# Patient Record
Sex: MALE | Race: WHITE | NOT HISPANIC OR LATINO | Employment: FULL TIME | URBAN - METROPOLITAN AREA
[De-identification: names, ages, dates, MRNs, and addresses within clinical notes are randomized per-mention and may not be internally consistent; named-entity substitution may affect disease eponyms.]

---

## 2019-03-27 ENCOUNTER — HOSPITAL ENCOUNTER (OUTPATIENT)
Dept: RESPIRATORY THERAPY | Facility: HOSPITAL | Age: 49
Discharge: HOME | End: 2019-03-27
Attending: INTERNAL MEDICINE | Admitting: INTERNAL MEDICINE

## 2019-05-28 ENCOUNTER — HOSPITAL ENCOUNTER (OUTPATIENT)
Dept: FAMILY MEDICINE CLINIC | Facility: CLINIC | Age: 49
Setting detail: SPECIMEN
Discharge: HOME | End: 2019-05-28
Attending: HOSPITALIST | Admitting: HOSPITALIST

## 2019-05-28 ENCOUNTER — CONVERSION ENCOUNTER (OUTPATIENT)
Dept: FAMILY MEDICINE CLINIC | Facility: CLINIC | Age: 49
End: 2019-05-28

## 2019-05-28 LAB
ALBUMIN SERPL-MCNC: 4.1 G/DL (ref 3.5–4.8)
ALBUMIN/GLOB SERPL: 1.4 {RATIO} (ref 1–1.7)
ALP SERPL-CCNC: 67 IU/L (ref 32–91)
ALT SERPL-CCNC: 27 IU/L (ref 17–63)
ANION GAP SERPL CALC-SCNC: 14.8 MMOL/L (ref 10–20)
AST SERPL-CCNC: 29 IU/L (ref 15–41)
BASOPHILS # BLD AUTO: 0 10*3/UL (ref 0–0.2)
BASOPHILS NFR BLD AUTO: 1 % (ref 0–2)
BILIRUB SERPL-MCNC: 1.1 MG/DL (ref 0.3–1.2)
BUN SERPL-MCNC: 18 MG/DL (ref 8–20)
BUN/CREAT SERPL: 13.8 (ref 6.2–20.3)
CALCIUM SERPL-MCNC: 9.2 MG/DL (ref 8.9–10.3)
CHLORIDE SERPL-SCNC: 102 MMOL/L (ref 101–111)
CHOLEST SERPL-MCNC: 171 MG/DL
CHOLEST/HDLC SERPL: 3.9 {RATIO}
CONV CO2: 24 MMOL/L (ref 22–32)
CONV LDL CHOLESTEROL DIRECT: 82 MG/DL (ref 0–100)
CONV TOTAL PROTEIN: 7.1 G/DL (ref 6.1–7.9)
CREAT UR-MCNC: 1.3 MG/DL (ref 0.7–1.2)
DIFFERENTIAL METHOD BLD: (no result)
EOSINOPHIL # BLD AUTO: 0.1 10*3/UL (ref 0–0.3)
EOSINOPHIL # BLD AUTO: 1 % (ref 0–3)
ERYTHROCYTE [DISTWIDTH] IN BLOOD BY AUTOMATED COUNT: 13.6 % (ref 11.5–14.5)
FOLATE SERPL-MCNC: >24.8 NG/ML (ref 5.9–24.8)
GLOBULIN UR ELPH-MCNC: 3 G/DL (ref 2.5–3.8)
GLUCOSE SERPL-MCNC: 88 MG/DL (ref 65–99)
HCT VFR BLD AUTO: 41.8 % (ref 40–54)
HDLC SERPL-MCNC: 43 MG/DL
HGB BLD-MCNC: 14.1 G/DL (ref 14–18)
LDLC/HDLC SERPL: 1.9 {RATIO}
LIPID INTERPRETATION: ABNORMAL
LYMPHOCYTES # BLD AUTO: 1.6 10*3/UL (ref 0.8–4.8)
LYMPHOCYTES NFR BLD AUTO: 23 % (ref 18–42)
MCH RBC QN AUTO: 30.4 PG (ref 26–32)
MCHC RBC AUTO-ENTMCNC: 33.8 G/DL (ref 32–36)
MCV RBC AUTO: 89.9 FL (ref 80–94)
MONOCYTES # BLD AUTO: 0.7 10*3/UL (ref 0.1–1.3)
MONOCYTES NFR BLD AUTO: 10 % (ref 2–11)
NEUTROPHILS # BLD AUTO: 4.6 10*3/UL (ref 2.3–8.6)
NEUTROPHILS NFR BLD AUTO: 65 % (ref 50–75)
NRBC BLD AUTO-RTO: 0 /100{WBCS}
NRBC/RBC NFR BLD MANUAL: 0 10*3/UL
PLATELET # BLD AUTO: 213 10*3/UL (ref 150–450)
PMV BLD AUTO: 9.6 FL (ref 7.4–10.4)
POTASSIUM SERPL-SCNC: 3.8 MMOL/L (ref 3.6–5.1)
PSA SERPL-MCNC: 0.59 NG/ML (ref 0–4)
RBC # BLD AUTO: 4.66 10*6/UL (ref 4.6–6)
SODIUM SERPL-SCNC: 137 MMOL/L (ref 136–144)
T4 FREE SERPL-MCNC: 1.07 NG/DL (ref 0.58–1.64)
TRIGL SERPL-MCNC: 548 MG/DL
TSH SERPL-ACNC: 1.93 UIU/ML (ref 0.34–5.6)
VIT B12 SERPL-MCNC: 491 PG/ML (ref 180–914)
VLDLC SERPL CALC-MCNC: 46.1 MG/DL
WBC # BLD AUTO: 7 10*3/UL (ref 4.5–11.5)

## 2019-06-04 VITALS
WEIGHT: 209 LBS | RESPIRATION RATE: 8 BRPM | HEART RATE: 80 BPM | SYSTOLIC BLOOD PRESSURE: 140 MMHG | BODY MASS INDEX: 28.31 KG/M2 | DIASTOLIC BLOOD PRESSURE: 92 MMHG | HEIGHT: 72 IN

## 2019-06-06 NOTE — PROGRESS NOTES
Vital Signs:    Patient Profile:    48 Years Old Male  Height:     72 inches (182.88 cm)  Weight:     209 pounds  BMI:        28.34     Temp:       99.1 degrees F oral  Pulse rate: 80 / minute  Pulse rhythm:   regular  Resp:       8 per minute  BP Sittin / 92  (left arm)    Cuff size:  large      Problems: Active problems were reviewed with the patient during this visit.  Medications: Medications were reviewed with the patient during this visit.  Allergies: Allergies were reviewed with the patient during this visit.        Vitals Entered By: [facility]      Visit Type:  New Pt  Referring Provider:  Chaim Rainey MD  Primary Provider:  Chaim Rainey MD      History of Present Illness:         The patient comes in today for a New Patient.  Here for new patient visit.  Has Crohns disease.           When questioned about any new concerns, the patient voices concerns with nothing in particular.           ROS is positive for bowel problems.  ROS is negative for chest pain, SOA, fatique, dizzyness, headaches, fever, chills, abdominal pain, bladder problems and urinary symptoms.        Past Medical History:     Reviewed history from 2016 and no changes required:        chron's        Sleep apnea        Elevated LFT's        LASIK    Past Surgical History:     colon ressection x 2     appendectomy     ORIF lt ankle     septoplasty    Family History Summary:      Reviewed history Last on 2019 and no changes required:2019  First Degree Blood Relative - Has No Known Family History - Entered On: 3/18/2016    General Comments - FH:  father  with COPD  Colon and lung cancer        Social History:     Reviewed history from 2019 and no changes required:                employed at [employer]                Smoking History:        Patient has never smoked.        Risk Factors:     Smoked Tobacco Use:  Never smoker  Smokeless Tobacco Use:  Never  Passive smoke exposure:  no  Drug use:   no  Caffeine use:  4 drinks per day  Alcohol use:  yes     Type:  social     Drinks per day:  0     Counseled to quit/cut down alcohol use:  yes  Exercise:  no    Previous Tobacco Use: Signed On - 01/07/2019  Smoked Tobacco Use:  Never smoker  Smokeless Tobacco Use:  Never  Passive smoke exposure:  no    Previous Alcohol Use: Signed On - 01/07/2019  Alcohol use:  no  Exercise:  no        Physical Exam   Height:  72  Weight:  202  BP:  140/92 mm HG    Medication List:  STELARA 90 MG/ML SUBCUTANEOUS SOLUTION PREFILLED SYRINGE (USTEKINUMAB) inject every 4 weeks  NEXIUM 40 MG ORAL CAPSULE DELAYED RELEASE (ESOMEPRAZOLE MAGNESIUM) take 1 pill daily  PROAIR  (90 Base) MCG/ACT INHALATION AEROSOL SOLUTION (ALBUTEROL SULFATE) 2 puffs q4h PRN  * MULTIVITAMIN       Surgical History   colon ressection x 2  appendectomy  ORIF lt ankle  septoplasty,    Risk Factors  Tobacco Use: Never smoker  Passive smoke exposure: no  Exercise: no  Illicit Drug use: no      Physical Examination   General Appearance   In no acute distress.  Alert & oriented.  Behavior and affect appropriate to situation  Skin   No suspicious lesions, moles or rashes . Turgor good.  HEENT   PERRLA, EOMI, TM's normal.  Pharynx clear  Neck   Supple.  No adenopathy  Cardiovascular   Regular rate and rhythm with S4 gale  Lungs   Clear to auscultation  Abdomen   Soft, non-tender.  Bowel sounds present.  No hepatosplenomegaly  Back   Examination of the back reveals straight leg lift is negative.  Flexes without much problem.  Lateral extension is good.  Reflexes are symmetric and there is no obvious deformity  Musculoskeletal   OA changes, degenerative changes  Neurologic   CN grossly intact  Psych   Alert and cooperative; normal mood and affect; normal attention span and concentration  Rectal   prostate enlarged, heme pending        Impression & Recommendations:    Problem # 1:  Preventive Health Care Exam (ICD-V70.0) (OQT50-V93.00)  check labs    This patient  overall looks good related to their annual checkup.  Problem areas were addressed and they were encouraged to continue good lifestyle habits and behaviors.    Orders:  [facility] CBC W/DIFF; PATH REVIEW IF INDICATED (CBC)  [facility] LIPID PANEL (LIPID)  [facility] PSA TOTAL (SCREENING) (PSA)  [facility] COMPREHENSIVE METABOLIC PANEL (CMP) (MPC)  [facility] VITAMIN B12 (B12)  [facility] FOLATE (FOL)  [facility] THYROID STIMULATING HORMONE (TSH) (TSH)  [facility] T4 THYROXINE FREE (FT4)      Problem # 2:  Crohn's disease (ICD-555.9) (WQE75-F30.90)  stable    Orders:  [facility] CBC W/DIFF; PATH REVIEW IF INDICATED (CBC)  [facility] LIPID PANEL (LIPID)  [facility] PSA TOTAL (SCREENING) (PSA)  [facility] COMPREHENSIVE METABOLIC PANEL (CMP) (MPC)  [facility] VITAMIN B12 (B12)  [facility] FOLATE (FOL)  [facility] THYROID STIMULATING HORMONE (TSH) (TSH)  [facility] T4 THYROXINE FREE (FT4)      Problem # 3:  GERD-esophageal reflux (ICD-530.81) (XWB43-R52.9)    Orders:  H CBC W/DIFF; PATH REVIEW IF INDICATED (CBC)  [facility] LIPID PANEL (LIPID)  [facility] PSA TOTAL (SCREENING) (PSA)  [facility] COMPREHENSIVE METABOLIC PANEL (CMP) (MPC)  [facility] VITAMIN B12 (B12)  [facility] FOLATE (FOL)  [facility] THYROID STIMULATING HORMONE (TSH) (TSH)  [facility] T4 THYROXINE FREE (FT4)    His updated medication list for this problem includes:     Nexium 40 Mg Oral Capsule Delayed Release (Esomeprazole magnesium) ..... Take 1 pill daily      Medications Added to Medication List This Visit:  1)  Stelara 90 Mg/ml Subcutaneous Solution Prefilled Syringe (Ustekinumab) .... Inject every 4 weeks  2)  Nexium 40 Mg Oral Capsule Delayed Release (Esomeprazole magnesium) .... Take 1 pill daily    Other Orders:  Hemocult - In house (CPT-)      Patient Instructions:  1)  During this visit for their annual exam, we reviewed their personal history, social history and family history.  We went over their medications and all the recommended health maintenence items for their age group. They were given the opportunity to ask   questions and discuss other concerns.  2)  Please schedule a follow-up  appointment as needed.                Medication Administration    Orders Added:  1)  Ofc Vst, New Level IV [[id number]]  2)  [facility] CBC W/DIFF; PATH REVIEW IF INDICATED [CBC]  3)  [facility] LIPID PANEL [LIPID]  4)  [facility] PSA TOTAL (SCREENING) [PSA]  5)  [facility] COMPREHENSIVE METABOLIC PANEL (CMP) [MPC]  6)  [facility] VITAMIN B12 [B12]  7)  [facility] FOLATE [FOL]  8)  [facility] THYROID STIMULATING HORMONE (TSH) [TSH]  9)  [facility] T4 THYROXINE FREE [FT4]  10)  Hemocult - In house [CPT-]    Technician: Medical Assistant SKYE HOLGUIN               Date/Time Collected: May 28, 2019 4:43 PM)  Date/Time Received: May 28, 2019 4:43 PM)  Performed by: kendall    Stool - Occult Blood   iFOB  #1: negative    05/28/2019      Negative (normal range)  Comments: entered by s        Electronically signed by Chaim Rainey MD on 05/28/2019 at 4:54 PM  ________________________________________________________________________       Disclaimer: Converted Note message may not contain all data elements that existed in the legacy source system. Please see [employer] for the original note details.

## 2020-01-27 ENCOUNTER — TELEPHONE (OUTPATIENT)
Dept: GASTROENTEROLOGY | Facility: CLINIC | Age: 50
End: 2020-01-27

## 2020-01-27 NOTE — TELEPHONE ENCOUNTER
Kimberly danielle from [facility] pharmacy a is needed for patient because he had his stelara transferred  [facility]  Cardholder Id [id number]  N [id number]  BIN [id number]  Group # [id number]

## 2020-02-03 ENCOUNTER — TELEPHONE (OUTPATIENT)
Dept: GASTROENTEROLOGY | Facility: CLINIC | Age: 50
End: 2020-02-03

## 2020-02-03 NOTE — TELEPHONE ENCOUNTER
Pt called and left VM, [facility]. He is requesting an update and to make her the his insurance has received his PA. Gave PA fax number of [phone]. Please call pt to discuss

## 2020-02-04 NOTE — TELEPHONE ENCOUNTER
Notified patient PA was faxed in last week.  Faxed it in again this morning.  He will let me know if he has any problems. pk

## 2020-02-05 ENCOUNTER — TELEPHONE (OUTPATIENT)
Dept: GASTROENTEROLOGY | Facility: CLINIC | Age: 50
End: 2020-02-05

## 2020-02-05 DIAGNOSIS — K50.90 CROHN'S DISEASE WITHOUT COMPLICATION, UNSPECIFIED GASTROINTESTINAL TRACT LOCATION (HCC): Primary | ICD-10-CM

## 2020-02-05 RX ORDER — METHYLPREDNISOLONE 4 MG/1
TABLET ORAL
Qty: 1 EACH | Refills: 0 | Status: CANCELLED | OUTPATIENT
Start: 2020-02-05

## 2020-02-05 NOTE — TELEPHONE ENCOUNTER
Patient would like for you to attempt the authorization, over the phone No Patient Care Coordination Note on file.   missed his [facility] Zarate and is late having the Feb injection. Patient is symptotic, and would like to know is there something he can take till he can get injection approved . Please advise     [employer] [phone]

## 2020-02-05 NOTE — TELEPHONE ENCOUNTER
Patient's Stelara was delayed due to change in insurance.  He will receive the medication this week.  He c/o fatigue, lethargy, stool frequency, diarrhea and lower abdominal cramping.  He is requesting a new rx such as Entocort.

## 2020-02-06 ENCOUNTER — SPECIALTY PHARMACY (OUTPATIENT)
Dept: PHARMACY | Facility: HOSPITAL | Age: 50
End: 2020-02-06

## 2020-02-06 DIAGNOSIS — K50.919 CROHN'S DISEASE WITH COMPLICATION, UNSPECIFIED GASTROINTESTINAL TRACT LOCATION (HCC): Primary | ICD-10-CM

## 2020-02-06 RX ORDER — METHYLPREDNISOLONE 4 MG/1
TABLET ORAL
Qty: 1 EACH | Refills: 0 | Status: SHIPPED | OUTPATIENT
Start: 2020-02-06 | End: 2020-02-19

## 2020-02-19 ENCOUNTER — OFFICE VISIT (OUTPATIENT)
Dept: GASTROENTEROLOGY | Facility: CLINIC | Age: 50
End: 2020-02-19

## 2020-02-19 ENCOUNTER — PREP FOR SURGERY (OUTPATIENT)
Dept: OTHER | Facility: HOSPITAL | Age: 50
End: 2020-02-19

## 2020-02-19 VITALS
SYSTOLIC BLOOD PRESSURE: 126 MMHG | TEMPERATURE: 98 F | HEIGHT: 72 IN | HEART RATE: 82 BPM | OXYGEN SATURATION: 99 % | WEIGHT: 220.4 LBS | BODY MASS INDEX: 29.85 KG/M2 | DIASTOLIC BLOOD PRESSURE: 82 MMHG

## 2020-02-19 DIAGNOSIS — K50.00 CROHN'S DISEASE OF SMALL INTESTINE WITHOUT COMPLICATION (HCC): Primary | ICD-10-CM

## 2020-02-19 DIAGNOSIS — Z79.899 HIGH RISK MEDICATION USE: ICD-10-CM

## 2020-02-19 DIAGNOSIS — K21.9 GASTROESOPHAGEAL REFLUX DISEASE WITHOUT ESOPHAGITIS: ICD-10-CM

## 2020-02-19 DIAGNOSIS — K75.81 NASH (NONALCOHOLIC STEATOHEPATITIS): ICD-10-CM

## 2020-02-19 PROCEDURE — 99214 OFFICE O/P EST MOD 30 MIN: CPT | Performed by: INTERNAL MEDICINE

## 2020-02-19 RX ORDER — LORATADINE 10 MG/1
10 CAPSULE, LIQUID FILLED ORAL DAILY
COMMUNITY

## 2020-02-19 RX ORDER — ESOMEPRAZOLE MAGNESIUM 40 MG/1
40 CAPSULE, DELAYED RELEASE ORAL
COMMUNITY

## 2020-02-19 RX ORDER — CYANOCOBALAMIN 1000 UG/ML
INJECTION, SOLUTION INTRAMUSCULAR; SUBCUTANEOUS
COMMUNITY
Start: 2020-01-31 | End: 2020-09-17 | Stop reason: SDUPTHER

## 2020-02-19 NOTE — PROGRESS NOTES
Crohn's Disease and Fatigue      HPI  Presents today for follow-up.  He has an established diagnosis of Crohn's ileocolitis, colon polyps, colon cancer in paternal grandfather, MACDONALD and abnormal liver enzymes.    He continues on Stelara every 4 weeks. He has been on Stelara for approx two years and 4 week dosing for atleast one year.     He contacted the office January 2020 with flare of symptoms as he had missed a dose of his Stelara injection. He had a change of symptoms with fatigue, decreased appetite.  He was prescribed a Medrol Dosepak and felt better than he did with his typical monthly stelara. Denies abdominal pain, joint pain or skin lesions    MR enterography May 2018 with stable inflammation no changes or worrisome findings.  Last EGD and colonoscopy March 13, 2019 with sever inflammation of ileum.    Previous treatments include infliximab, adalimumab and azathioprine.    He continues to follow with dermatology with small noncancerous lesions removed on a regular basis.    He has been followed by Dr. Melvin Rojas for Macdonald and abnormal liver enzymes.  He has had previous liver biopsy without fibrosis several years ago.    Review of Systems   Constitutional: Positive for fatigue.   Allergic/Immunologic: Positive for environmental allergies and immunocompromised state.        Problem List:  There is no problem list on file for this patient.      Medical History:    Past Medical History:   Diagnosis Date   • Crohn's disease (CMS/HCC)         Social History:    Social History     Socioeconomic History   • Marital status:      Spouse name: Not on file   • Number of children: Not on file   • Years of education: Not on file   • Highest education level: Not on file   Tobacco Use   • Smoking status: Never Smoker   • Smokeless tobacco: Never Used   Substance and Sexual Activity   • Alcohol use: Yes   • Drug use: Never       Family History:   Family History   Problem Relation Age of Onset   • Colon cancer  Paternal Grandfather        Surgical History:   Past Surgical History:   Procedure Laterality Date   • COLONOSCOPY  2018   • UPPER GASTROINTESTINAL ENDOSCOPY           Current Outpatient Medications:   •  cyanocobalamin 1000 MCG/ML injection, INJECT 1ML INTRAMUSCULARLY ONCE A MONTH, Disp: , Rfl:   •  esomeprazole (nexIUM) 40 MG capsule, Take 40 mg by mouth Every Morning Before Breakfast., Disp: , Rfl:   •  Loratadine (CLARITIN) 10 MG capsule, Take  by mouth., Disp: , Rfl:   •  Multiple Vitamins-Minerals (MULTIVITAMIN ADULT EXTRA C PO), MULTIVITAMIN, Disp: , Rfl:   •  Ustekinumab (STELARA) 90 MG/ML solution prefilled syringe Injection, Inject 90 mg subcutaneously every 4 weeks., Disp: 1 mL, Rfl: 1  •  vitamin E 100 UNIT capsule, Take 100 Units by mouth Daily., Disp: , Rfl:     Allergies:   Allergies   Allergen Reactions   • Sulfa Antibiotics Hives        The following portions of the patient's history were reviewed by me and updated as appropriate: review of systems, allergies, current medications, past family history, past medical history, past social history, past surgical history and problem list.    Vitals:    02/19/20 0820   BP: 126/82   Pulse: 82   Temp: 98 °F (36.7 °C)   SpO2: 99%       Physical Exam   Abdominal: Soft. Normal appearance and bowel sounds are normal. He exhibits no distension, no pulsatile midline mass and no mass. There is no tenderness. There is no rigidity, no rebound and no guarding.   Vitals reviewed.       Assessment/ Plan  Diagnoses and all orders for this visit:    Crohn's disease of small intestine without complication (CMS/HCC)    High risk medication use    MACDONALD (nonalcoholic steatohepatitis)    Gastroesophageal reflux disease without esophagitis    Other orders  -     Multiple Vitamins-Minerals (MULTIVITAMIN ADULT EXTRA C PO); MULTIVITAMIN  -     cyanocobalamin 1000 MCG/ML injection; INJECT 1ML INTRAMUSCULARLY ONCE A MONTH  -     esomeprazole (nexIUM) 40 MG capsule; Take 40 mg by  mouth Every Morning Before Breakfast.  -     Loratadine (CLARITIN) 10 MG capsule; Take  by mouth.  -     vitamin E 100 UNIT capsule; Take 100 Units by mouth Daily.        Continue Stelara every 4 weeks.    Due to high risk medication use, it is recommended to stay up to date on vaccinations including a yearly flu vaccine. Do not receive any live virus vaccines. If you are ill, have a fever, or require an antibiotic please contact the office as we may consider holding dose of medication. Recommend yearly skin checks with dermatology and staying up to date on gynecologic exams.    Obtain blood work for medication monitoring, orders placed.     Continue Nexium for acid reflux symptoms.     Will obtain Dr Castellanos records and consider monitoring for MACDONALD with repeat FibroSURE based on his previous recommendations  Discussion:  Patient with persistent inflammation of the ileum. Will update colonoscopy, assessing for remission/improvement with every 4 week dosing. Patients main goal is to avoid surgery and we may need to consider adding MTX or change in therapy to Entyvio.       Documentation by Kelly SENA acting as a scribe in the following sections (HPI, Assessment, Plan) for the undersigned provider. Jamie    Note: Refer to After Visit Summary for details of patient counseling, education and instructions provided during the encounter    Addendum: FibroScan Reviewed March 2019 with mild fatty liver F0 to F1.

## 2020-02-20 LAB
ALBUMIN SERPL-MCNC: 4.3 G/DL (ref 3.5–5.2)
ALP SERPL-CCNC: 77 U/L (ref 39–117)
ALT SERPL-CCNC: 30 U/L (ref 1–41)
AST SERPL-CCNC: 20 U/L (ref 1–40)
BASOPHILS # BLD AUTO: 0.03 10*3/MM3 (ref 0–0.2)
BASOPHILS NFR BLD AUTO: 0.4 % (ref 0–1.5)
BILIRUB DIRECT SERPL-MCNC: 0.2 MG/DL (ref 0.2–0.3)
BILIRUB SERPL-MCNC: 0.7 MG/DL (ref 0.2–1.2)
EOSINOPHIL # BLD AUTO: 0.07 10*3/MM3 (ref 0–0.4)
EOSINOPHIL NFR BLD AUTO: 1 % (ref 0.3–6.2)
ERYTHROCYTE [DISTWIDTH] IN BLOOD BY AUTOMATED COUNT: 13 % (ref 12.3–15.4)
HCT VFR BLD AUTO: 41.5 % (ref 37.5–51)
HGB BLD-MCNC: 14 G/DL (ref 13–17.7)
IMM GRANULOCYTES # BLD AUTO: 0.02 10*3/MM3 (ref 0–0.05)
IMM GRANULOCYTES NFR BLD AUTO: 0.3 % (ref 0–0.5)
LYMPHOCYTES # BLD AUTO: 1.29 10*3/MM3 (ref 0.7–3.1)
LYMPHOCYTES NFR BLD AUTO: 18.7 % (ref 19.6–45.3)
MCH RBC QN AUTO: 30.5 PG (ref 26.6–33)
MCHC RBC AUTO-ENTMCNC: 33.7 G/DL (ref 31.5–35.7)
MCV RBC AUTO: 90.4 FL (ref 79–97)
MONOCYTES # BLD AUTO: 0.6 10*3/MM3 (ref 0.1–0.9)
MONOCYTES NFR BLD AUTO: 8.7 % (ref 5–12)
NEUTROPHILS # BLD AUTO: 4.88 10*3/MM3 (ref 1.7–7)
NEUTROPHILS NFR BLD AUTO: 70.9 % (ref 42.7–76)
NRBC BLD AUTO-RTO: 0 /100 WBC (ref 0–0.2)
PLATELET # BLD AUTO: 183 10*3/MM3 (ref 140–450)
PROT SERPL-MCNC: 7.1 G/DL (ref 6–8.5)
RBC # BLD AUTO: 4.59 10*6/MM3 (ref 4.14–5.8)
WBC # BLD AUTO: 6.89 10*3/MM3 (ref 3.4–10.8)

## 2020-02-25 NOTE — PROGRESS NOTES
Specialty Pharmacy Note      Name:  Margarito Miles  :  1970  Date:  2020       Past Medical History:   Diagnosis Date   • Crohn's disease (CMS/HCC)        Past Surgical History:   Procedure Laterality Date   • COLONOSCOPY     • UPPER GASTROINTESTINAL ENDOSCOPY         Social History     Socioeconomic History   • Marital status:      Spouse name: Not on file   • Number of children: Not on file   • Years of education: Not on file   • Highest education level: Not on file   Tobacco Use   • Smoking status: Never Smoker   • Smokeless tobacco: Never Used   Substance and Sexual Activity   • Alcohol use: Yes   • Drug use: Never       Family History   Problem Relation Age of Onset   • Colon cancer Paternal Grandfather        Allergies   Allergen Reactions   • Sulfa Antibiotics Hives       Current Outpatient Medications   Medication Sig Dispense Refill   • cyanocobalamin 1000 MCG/ML injection INJECT 1ML INTRAMUSCULARLY ONCE A MONTH     • esomeprazole (nexIUM) 40 MG capsule Take 40 mg by mouth Every Morning Before Breakfast.     • Loratadine (CLARITIN) 10 MG capsule Take  by mouth.     • Multiple Vitamins-Minerals (MULTIVITAMIN ADULT EXTRA C PO) MULTIVITAMIN     • Ustekinumab (STELARA) 90 MG/ML solution prefilled syringe Injection Inject 90 mg subcutaneously every 4 weeks. 1 mL 1   • vitamin E 100 UNIT capsule Take 100 Units by mouth Daily.       No current facility-administered medications for this visit.          LABORATORY:    Lab Results   Component Value Date    TSH 1.93 2019     No results found for: PROTIME, INR, PTT  No results found for: HAV, HCVQUANT, YPTBUX51, HCVINFO, HCVGENOTYPE  No results found for: AMPHETSCREEN, BARBITSCNUR, LABBENZSCN, COCAINEUR, LABMETHSCN, LRHNZ7E, HOXBG1EGPPFR, HEPBSAB, OXYCODONESCN, 6ACETYLMORP, BUPRENORSCNU, LABOPIASCN, PCPUR, THCURSCR  Last Urine Toxicity     There is no flowsheet data to display.          ASSESSMENT/PLAN:    Patient Update  Assessment (new medications, allergies, medical history): Reviewed    Medication(s): Stelara    Currently Taking Medication(s): Yes    Effectiveness of Medication: Effective    Experiencing Side Effects: Not experiencing side effects    Prior Authorization Status: Approved    Financial Assistance Status: None needed.    Any Issues Identified: Not aware of any issues.    Appropriate to Process Prescription(s): After discussion with patient, it is appropriate to process and dispense medication. It will be shipped to patient's home via [facility].    Counseling Offered: Declined    Next Specialty Pharmacy Visit: 3/3/2020

## 2020-03-03 ENCOUNTER — SPECIALTY PHARMACY (OUTPATIENT)
Dept: PHARMACY | Facility: HOSPITAL | Age: 50
End: 2020-03-03

## 2020-03-30 ENCOUNTER — SPECIALTY PHARMACY (OUTPATIENT)
Dept: PHARMACY | Facility: HOSPITAL | Age: 50
End: 2020-03-30

## 2020-04-03 NOTE — PROGRESS NOTES
Specialty Pharmacy Note      Name:  Margarito Miles  :  1970  Date:  2020         Past Medical History:   Diagnosis Date   • Crohn's disease (CMS/HCC)        Past Surgical History:   Procedure Laterality Date   • COLONOSCOPY     • UPPER GASTROINTESTINAL ENDOSCOPY         Social History     Socioeconomic History   • Marital status:      Spouse name: Not on file   • Number of children: Not on file   • Years of education: Not on file   • Highest education level: Not on file   Tobacco Use   • Smoking status: Never Smoker   • Smokeless tobacco: Never Used   Substance and Sexual Activity   • Alcohol use: Yes   • Drug use: Never       Family History   Problem Relation Age of Onset   • Colon cancer Paternal Grandfather        Allergies   Allergen Reactions   • Sulfa Antibiotics Hives       Current Outpatient Medications   Medication Sig Dispense Refill   • cyanocobalamin 1000 MCG/ML injection INJECT 1ML INTRAMUSCULARLY ONCE A MONTH     • esomeprazole (nexIUM) 40 MG capsule Take 40 mg by mouth Every Morning Before Breakfast.     • Loratadine (CLARITIN) 10 MG capsule Take  by mouth.     • Multiple Vitamins-Minerals (MULTIVITAMIN ADULT EXTRA C PO) MULTIVITAMIN     • Ustekinumab (STELARA) 90 MG/ML solution prefilled syringe Injection Inject 90 mg subcutaneously every 4 weeks. 1 mL 1   • vitamin E 100 UNIT capsule Take 100 Units by mouth Daily.       No current facility-administered medications for this visit.          LABORATORY:    Lab Results   Component Value Date    TSH 1.93 2019       Last Urine Toxicity     There is no flowsheet data to display.          ASSESSMENT/PLAN:    Patient Update Assessment (new medications, allergies, medical history): Reviewed     Medication(s): Stelara     Currently Taking Medication(s): Yes     Effectiveness of Medication: Effective     Experiencing Side Effects: Not experiencing side effects     Prior Authorization Status: Approved     Financial  Assistance Status: None needed.     Any Issues Identified: Not aware of any issues.    Appropriate to Process Prescription(s):  Yes.  Medication refill will be dispensed from [facility] and delivered via home delivery services per patient request.    Counseling Offered: Patient previously counseled upon initiation of therapy, aware to contact pharmacy with any new questions or concerns.      Next Specialty Pharmacy Visit:  04/22/2020

## 2020-04-22 ENCOUNTER — SPECIALTY PHARMACY (OUTPATIENT)
Dept: PHARMACY | Facility: HOSPITAL | Age: 50
End: 2020-04-22

## 2020-04-29 NOTE — PROGRESS NOTES
Specialty Pharmacy Note      Name:  Margarito Miles  :  1970  Date:  2020         Past Medical History:   Diagnosis Date   • Crohn's disease (CMS/HCC)        Past Surgical History:   Procedure Laterality Date   • COLONOSCOPY     • UPPER GASTROINTESTINAL ENDOSCOPY         Social History     Socioeconomic History   • Marital status:      Spouse name: Not on file   • Number of children: Not on file   • Years of education: Not on file   • Highest education level: Not on file   Tobacco Use   • Smoking status: Never Smoker   • Smokeless tobacco: Never Used   Substance and Sexual Activity   • Alcohol use: Yes   • Drug use: Never       Family History   Problem Relation Age of Onset   • Colon cancer Paternal Grandfather        Allergies   Allergen Reactions   • Sulfa Antibiotics Hives       Current Outpatient Medications   Medication Sig Dispense Refill   • cyanocobalamin 1000 MCG/ML injection INJECT 1ML INTRAMUSCULARLY ONCE A MONTH     • esomeprazole (nexIUM) 40 MG capsule Take 40 mg by mouth Every Morning Before Breakfast.     • Loratadine (CLARITIN) 10 MG capsule Take  by mouth.     • Multiple Vitamins-Minerals (MULTIVITAMIN ADULT EXTRA C PO) MULTIVITAMIN     • Ustekinumab (STELARA) 90 MG/ML solution prefilled syringe Injection Inject 90 mg subcutaneously every 4 weeks. 1 mL 1   • vitamin E 100 UNIT capsule Take 100 Units by mouth Daily.       No current facility-administered medications for this visit.          LABORATORY:    Lab Results   Component Value Date    TSH 1.93 2019       Last Urine Toxicity     There is no flowsheet data to display.          ASSESSMENT/PLAN:    Patient Update Assessment (new medications, allergies, medical history): Reviewed     Medication(s): Stelara 90 MG/ML solution prefilled syringe Injection.     Currently Taking Medication(s): Yes, patient reports taking medication as directed.      Effectiveness of Medication: Effective.     Experiencing Side  Effects: Not experiencing side effects, patient tolerating medication well.      Prior Authorization Status: Approved.     Financial Assistance Status: None needed at this time, co-pay reaming due from patient is $5.      Any Issues Identified: No issues expressed from patient, no issues processing refill.    Appropriate to Process Prescription(s):  Yes.  Medication refill will be dispensed from [facility] and delivered via home delivery services per patient request.    Counseling Offered: Patient previously counseled upon initiation of therapy, aware to contact pharmacy with any new questions or concerns.      Next Specialty Pharmacy Visit:  05/25/2020

## 2020-05-11 ENCOUNTER — TELEPHONE (OUTPATIENT)
Dept: GASTROENTEROLOGY | Facility: CLINIC | Age: 50
End: 2020-05-11

## 2020-05-11 RX ORDER — METHYLPREDNISOLONE 4 MG/1
TABLET ORAL
Qty: 21 TABLET | Refills: 0 | Status: SHIPPED | OUTPATIENT
Start: 2020-05-11 | End: 2020-06-04

## 2020-05-11 NOTE — TELEPHONE ENCOUNTER
Patient contacted the office with complaints of fatigue, mild abdominal pain.    He reports increased stress due to COVID-19.    He continues on Stelara monthly and B-12 injection.   He is requesting a Medrol Dosepak / entocort to help bridge symptoms with his maintenance Stelara.  [phone].    Spoke with patient.  His symptoms feel like typical Crohn's flares.  He is aware that there was active inflammation and has upcoming endoscopic evaluation planned to consider change in therapy based on active inflammation.  Prescription sent electronically for Medrol Dosepak.  COVID precautions including immunosuppression and the use of steroids was reviewed in detail and patient verbalized agreement and understanding with CDC guidelines and caution.    If symptoms do not improve, to consider testing for COVID or longer course of budesonide.    Jamie

## 2020-05-27 ENCOUNTER — SPECIALTY PHARMACY (OUTPATIENT)
Dept: PHARMACY | Facility: HOSPITAL | Age: 50
End: 2020-05-27

## 2020-06-04 ENCOUNTER — OFFICE VISIT (OUTPATIENT)
Dept: GASTROENTEROLOGY | Facility: CLINIC | Age: 50
End: 2020-06-04

## 2020-06-04 VITALS
BODY MASS INDEX: 30.37 KG/M2 | TEMPERATURE: 97.1 F | DIASTOLIC BLOOD PRESSURE: 74 MMHG | OXYGEN SATURATION: 97 % | SYSTOLIC BLOOD PRESSURE: 118 MMHG | HEIGHT: 72 IN | RESPIRATION RATE: 12 BRPM | HEART RATE: 84 BPM | WEIGHT: 224.2 LBS

## 2020-06-04 DIAGNOSIS — Z79.899 HIGH RISK MEDICATION USE: ICD-10-CM

## 2020-06-04 DIAGNOSIS — K75.81 NASH (NONALCOHOLIC STEATOHEPATITIS): ICD-10-CM

## 2020-06-04 DIAGNOSIS — K50.00 CROHN'S DISEASE OF SMALL INTESTINE WITHOUT COMPLICATION (HCC): Primary | ICD-10-CM

## 2020-06-04 PROCEDURE — 99213 OFFICE O/P EST LOW 20 MIN: CPT | Performed by: NURSE PRACTITIONER

## 2020-06-04 RX ORDER — B-COMPLEX WITH VITAMIN C
1 TABLET ORAL DAILY
COMMUNITY
Start: 2020-06-01 | End: 2020-10-22

## 2020-06-04 RX ORDER — PYRIDOXINE HCL (VITAMIN B6) 100 MG
200 TABLET ORAL DAILY
COMMUNITY
Start: 2020-06-01

## 2020-06-04 NOTE — PROGRESS NOTES
Follow-up (Crohn Disease, doing well)      HPI  49-year-old male presents today for follow-up.  Patient has established diagnosis of Crohn's ileitis, colon polyps, colon cancer in paternal grandfather, Chance and abnormal liver enzymes.  Last office visit February 19, 2020.  He continues on Stelara every 4 weeks.  He has been on Stelara for approximately 2 years and on the 4-week dosing interval for approximately 1 year.    Patient contacted the office May 11, 2020 with complaints of fatigue and mild abdominal pain.  This is typical for her symptoms he will experience with a flare of Crohn disease.  He was scheduled for colonoscopy to assess for active inflammation and possible consideration of medication change however this was canceled due to the pandemic and has been rescheduled for the next 6 to 8 weeks.    He completed Medrol Dosepak and fatigue, decreased appetite and abdominal pain completely resolved.    Patient is doing well from a GI standpoint at today's visit.  Bowel habits are regular.  He typically has 4 or 5 bowel movements on a good day.  If it is a bad day or he is in a flare he will have 8-12 bowel movements a day with right lower quadrant discomfort.    He denies melena or hematochezia.    He denies weight loss, fever, chills.    He reports compliance with his Stelara injections.    Previous treatments include infliximab, adalimumab and azathioprine.    Patient continues to follow with dermatology with small noncancerous lesions removed on a regular basis.    Patient has been followed by Dr. Melvin Rojas for diagnosis of Chance and abnormal liver enzymes.  He has had previous liver biopsy several years ago without fibrosis.    MR enterography May 2018 with stable inflammation and no changes or worrisome findings.  Last EGD and colonoscopy March 13, 2019 with severe inflammation of the ileum, biopsies benign.    Review of Systems   Constitutional: Negative.    HENT: Negative.    Eyes: Negative.     Respiratory: Negative.    Cardiovascular: Negative.    Gastrointestinal: Negative.    Endocrine: Negative.    Genitourinary: Negative.    Musculoskeletal: Negative.    Skin: Negative.    Allergic/Immunologic: Positive for environmental allergies.   Neurological: Negative.    Hematological: Negative.    Psychiatric/Behavioral: Negative.         I    Problem List:  There is no problem list on file for this patient.      Medical History:    Past Medical History:   Diagnosis Date   • Crohn's disease (CMS/HCC)         Social History:    Social History     Socioeconomic History   • Marital status:      Spouse name: Not on file   • Number of children: Not on file   • Years of education: Not on file   • Highest education level: Not on file   Tobacco Use   • Smoking status: Never Smoker   • Smokeless tobacco: Never Used   Substance and Sexual Activity   • Alcohol use: Yes   • Drug use: Never       Family History:   Family History   Problem Relation Age of Onset   • Colon cancer Paternal Grandfather    • Colon polyps Other        Surgical History:   Past Surgical History:   Procedure Laterality Date   • COLONOSCOPY  2018   • UPPER GASTROINTESTINAL ENDOSCOPY           Current Outpatient Medications:   •  cyanocobalamin 1000 MCG/ML injection, INJECT 1ML INTRAMUSCULARLY ONCE A MONTH, Disp: , Rfl:   •  esomeprazole (nexIUM) 40 MG capsule, Take 40 mg by mouth Every Morning Before Breakfast., Disp: , Rfl:   •  Loratadine (CLARITIN) 10 MG capsule, Take  by mouth., Disp: , Rfl:   •  Milk Thistle 200 MG capsule, , Disp: , Rfl:   •  Multiple Vitamins-Minerals (MULTIVITAMIN ADULT EXTRA C PO), MULTIVITAMIN, Disp: , Rfl:   •  Ustekinumab (STELARA) 90 MG/ML solution prefilled syringe Injection, Inject 90 mg subcutaneously every 4 weeks., Disp: 1 mL, Rfl: 1  •  vitamin E 100 UNIT capsule, Take 100 Units by mouth Daily., Disp: , Rfl:   •  Zinc 100 MG tablet, , Disp: , Rfl:     Allergies:   Allergies   Allergen Reactions   • Sulfa  Antibiotics Hives        The following portions of the patient's history were reviewed and updated as appropriate: allergies, current medications, past family history, past medical history, past social history, past surgical history and problem list.    Vitals:    06/04/20 0802   BP: 118/74   Pulse: 84   Resp: 12   Temp: 97.1 °F (36.2 °C)   SpO2: 97%         06/04/20 0802   Weight: 102 kg (224 lb 3.2 oz)     Body mass index is 30.41 kg/m².      Physical Exam   Constitutional: He is oriented to person, place, and time. He appears well-developed and well-nourished.   Pulmonary/Chest: Effort normal.   Neurological: He is alert and oriented to person, place, and time.   Skin: Skin is warm and dry.        Assessment/ Plan  There are no diagnoses linked to this encounter.     No follow-ups on file.    Patient Instructions   Crohn's disease, stable, continue ustekinumab.    Acid reflux, stable, continue current medication regimen.    Proceed with endoscopic evaluation assessing for active disease, as scheduled.    Based on endoscopic findings and previous discussion with Dr. Thompson, to consider change in therapy.  Upcoming colonoscopy will assess for remission/improvement in persistent inflammation of the ileum since you have been on 4-week dosing interval of Stelara.          Discussion:    Patient had improvement in fatigue, right lower quadrant discomfort and more frequent stooling with recent Medrol Dosepak.  He is doing well from a GI standpoint.  Will proceed with colonoscopy as scheduled to evaluate for chronic/persistent inflammation of the ileum as patient has been on ustekinumab at 4-week dosing interval since the time of his last colonoscopy.  Patient's main goal is to avoid surgery and we may need to consider adding an additional medication or change in therapy to vedolizumab.    COVID-19 precautions for immunosuppressed patients reviewed in detail.  Currently we do not recommend stopping medications for  inflammatory bowel disease due to the risk of IBD flare which may result in hospitalization or the need for steroids.     Patients on biologic therapy for inflammatory bowel disease are at a moderate risk of severe illness from COVID-19 and it is strongly recommended that you strictly follow the CDC recommendation on wearing masks, staying home and social distancing.

## 2020-06-04 NOTE — PATIENT INSTRUCTIONS
Crohn's disease, stable, continue ustekinumab.    Acid reflux, stable, continue current medication regimen.    Proceed with endoscopic evaluation assessing for active disease, as scheduled.    Based on endoscopic findings and previous discussion with Dr. Thompson, to consider change in therapy.  Upcoming colonoscopy will assess for remission/improvement in persistent inflammation of the ileum since you have been on 4-week dosing interval of Stelara.

## 2020-06-09 NOTE — PROGRESS NOTES
Specialty Pharmacy Note      Name:  Margarito Miles  :  1970  Date:  2020         Past Medical History:   Diagnosis Date   • Crohn's disease (CMS/HCC)        Past Surgical History:   Procedure Laterality Date   • COLONOSCOPY  2018   • UPPER GASTROINTESTINAL ENDOSCOPY         Social History     Socioeconomic History   • Marital status:      Spouse name: Not on file   • Number of children: Not on file   • Years of education: Not on file   • Highest education level: Not on file   Tobacco Use   • Smoking status: Never Smoker   • Smokeless tobacco: Never Used   Substance and Sexual Activity   • Alcohol use: Yes   • Drug use: Never       Family History   Problem Relation Age of Onset   • Colon cancer Paternal Grandfather    • Colon polyps Other        Allergies   Allergen Reactions   • Sulfa Antibiotics Hives       Current Outpatient Medications   Medication Sig Dispense Refill   • cyanocobalamin 1000 MCG/ML injection INJECT 1ML INTRAMUSCULARLY ONCE A MONTH     • esomeprazole (nexIUM) 40 MG capsule Take 40 mg by mouth Every Morning Before Breakfast.     • Loratadine (CLARITIN) 10 MG capsule Take  by mouth.     • Milk Thistle 200 MG capsule      • Multiple Vitamins-Minerals (MULTIVITAMIN ADULT EXTRA C PO) MULTIVITAMIN     • Ustekinumab (STELARA) 90 MG/ML solution prefilled syringe Injection Inject 90 mg subcutaneously every 4 weeks. 1 mL 1   • vitamin E 100 UNIT capsule Take 100 Units by mouth Daily.     • Zinc 100 MG tablet        No current facility-administered medications for this visit.          LABORATORY:    Lab Results   Component Value Date    WBC 6.89 2020    HGB 14.0 2020    HCT 41.5 2020    MCV 90.4 2020    RDW 13.0 2020     2020    NEUTRORELPCT 70.9 2020    LYMPHORELPCT 18.7 (L) 2020    MONORELPCT 8.7 2020    EOSRELPCT 1.0 2020    BASORELPCT 0.4 2020    NEUTROABS 4.88 2020    LYMPHSABS 1.29 2020        Lab Results   Component Value Date     05/28/2019    K 3.8 05/28/2019    CO2 24 05/28/2019     05/28/2019    BUN 18 05/28/2019    CREATININE 1.3 (H) 05/28/2019    GLUCOSE 88 05/28/2019    CALCIUM 9.2 05/28/2019    ALKPHOS 77 02/19/2020    AST 20 02/19/2020    ALT 30 02/19/2020    BILITOT 0.7 02/19/2020    ALBUMIN 4.30 02/19/2020    PROTEINTOT 7.1 05/28/2019       No results found for: LDH, URICACID     Imaging Results (Last 24 Hours)     ** No results found for the last 24 hours. **          ASSESSMENT/PLAN:    Patient Update Assessment (new medications, allergies, medical history): Reviewed     Medication(s): Stelara 90 MG/ML solution prefilled syringe Injection.     Currently Taking Medication(s): Yes, patient reports taking medication as directed.      Effectiveness of Medication: Effective.     Experiencing Side Effects: Not experiencing side effects, patient tolerating medication well.         Prior Authorization Status: Approved.     Financial Assistance Status: None needed at this time, co-pay reaming due from patient is $5.      Any Issues Identified: No issues expressed from patient, no issues processing refill.     Appropriate to Process Prescription(s):  Yes.  Medication refill will be dispensed from [facility] and delivered via home delivery services per patient request.     Counseling Offered: Patient previously counseled upon initiation of therapy, aware to contact pharmacy with any new questions or concerns.      Next Specialty Pharmacy Visit:  06/25/2020

## 2020-06-25 ENCOUNTER — SPECIALTY PHARMACY (OUTPATIENT)
Dept: PHARMACY | Facility: HOSPITAL | Age: 50
End: 2020-06-25

## 2020-07-01 NOTE — TELEPHONE ENCOUNTER
07/01/20 1507   Psycho Education   Type of Intervention structured groups   Response participates, initiates socially appropriate   Hours 1   Treatment Detail Processing group   CTC (writer) lead processing group about sleep hygiene. Pt demonstrated good understanding of the topic matter and provided appropriate feedback to the group.    [facility]  PK  Patient is on Stelara Q four weeks with [employer].    Card flanagan ID  [id number]  n  [id number]  BIN  [id number]  Group  [id number]  Tried & failed Remicade, Humira and Prednisone.  On Stelara for two years.  [facility]  [phone]  Sent in [place].  pk

## 2020-07-08 ENCOUNTER — LAB REQUISITION (OUTPATIENT)
Dept: LAB | Facility: HOSPITAL | Age: 50
End: 2020-07-08

## 2020-07-08 DIAGNOSIS — Z00.00 ENCOUNTER FOR GENERAL ADULT MEDICAL EXAMINATION WITHOUT ABNORMAL FINDINGS: ICD-10-CM

## 2020-07-08 PROCEDURE — U0004 COV-19 TEST NON-CDC HGH THRU: HCPCS | Performed by: INTERNAL MEDICINE

## 2020-07-09 LAB
REF LAB TEST METHOD: NORMAL
SARS-COV-2 RNA RESP QL NAA+PROBE: NOT DETECTED

## 2020-07-10 ENCOUNTER — OUTSIDE FACILITY SERVICE (OUTPATIENT)
Dept: GASTROENTEROLOGY | Facility: CLINIC | Age: 50
End: 2020-07-10

## 2020-07-10 ENCOUNTER — LAB REQUISITION (OUTPATIENT)
Dept: LAB | Facility: HOSPITAL | Age: 50
End: 2020-07-10

## 2020-07-10 DIAGNOSIS — K50.00 CROHN'S DISEASE OF SMALL INTESTINE WITHOUT COMPLICATIONS (HCC): ICD-10-CM

## 2020-07-10 PROCEDURE — 45380 COLONOSCOPY AND BIOPSY: CPT | Performed by: INTERNAL MEDICINE

## 2020-07-10 PROCEDURE — 88305 TISSUE EXAM BY PATHOLOGIST: CPT | Performed by: INTERNAL MEDICINE

## 2020-07-13 LAB
CYTO UR: NORMAL
LAB AP CASE REPORT: NORMAL
LAB AP CLINICAL INFORMATION: NORMAL
PATH REPORT.FINAL DX SPEC: NORMAL
PATH REPORT.GROSS SPEC: NORMAL

## 2020-07-14 DIAGNOSIS — K50.919 CROHN'S DISEASE WITH COMPLICATION, UNSPECIFIED GASTROINTESTINAL TRACT LOCATION (HCC): Primary | ICD-10-CM

## 2020-07-17 NOTE — PROGRESS NOTES
Specialty Pharmacy Note      Name:  Margarito Miles  :  1970  Date:  2020         Past Medical History:   Diagnosis Date   • Crohn's disease (CMS/HCC)        Past Surgical History:   Procedure Laterality Date   • COLONOSCOPY  2018   • UPPER GASTROINTESTINAL ENDOSCOPY         Social History     Socioeconomic History   • Marital status:      Spouse name: Not on file   • Number of children: Not on file   • Years of education: Not on file   • Highest education level: Not on file   Tobacco Use   • Smoking status: Never Smoker   • Smokeless tobacco: Never Used   Substance and Sexual Activity   • Alcohol use: Yes   • Drug use: Never       Family History   Problem Relation Age of Onset   • Colon cancer Paternal Grandfather    • Colon polyps Other        Allergies   Allergen Reactions   • Sulfa Antibiotics Hives       Current Outpatient Medications   Medication Sig Dispense Refill   • cyanocobalamin 1000 MCG/ML injection INJECT 1ML INTRAMUSCULARLY ONCE A MONTH     • esomeprazole (nexIUM) 40 MG capsule Take 40 mg by mouth Every Morning Before Breakfast.     • Loratadine (CLARITIN) 10 MG capsule Take  by mouth.     • Milk Thistle 200 MG capsule      • Multiple Vitamins-Minerals (MULTIVITAMIN ADULT EXTRA C PO) MULTIVITAMIN     • Ustekinumab (STELARA) 90 MG/ML solution prefilled syringe Injection Inject 90 mg subcutaneously every 4 weeks. 1 mL 1   • vitamin E 100 UNIT capsule Take 100 Units by mouth Daily.     • Zinc 100 MG tablet        No current facility-administered medications for this visit.          LABORATORY:    Lab Results   Component Value Date    WBC 6.89 2020    HGB 14.0 2020    HCT 41.5 2020    MCV 90.4 2020    RDW 13.0 2020     2020    NEUTRORELPCT 70.9 2020    LYMPHORELPCT 18.7 (L) 2020    MONORELPCT 8.7 2020    EOSRELPCT 1.0 2020    BASORELPCT 0.4 2020    NEUTROABS 4.88 2020    LYMPHSABS 1.29 2020        Lab Results   Component Value Date     05/28/2019    K 3.8 05/28/2019    CO2 24 05/28/2019     05/28/2019    BUN 18 05/28/2019    CREATININE 1.3 (H) 05/28/2019    GLUCOSE 88 05/28/2019    CALCIUM 9.2 05/28/2019    ALKPHOS 77 02/19/2020    AST 20 02/19/2020    ALT 30 02/19/2020    BILITOT 0.7 02/19/2020    ALBUMIN 4.30 02/19/2020    PROTEINTOT 7.1 05/28/2019       No results found for: LDH, URICACID     Imaging Results (Last 24 Hours)     ** No results found for the last 24 hours. **          ASSESSMENT/PLAN:    Patient Update Assessment (new medications, allergies, medical history): Reviewed     Medication(s): Stelara 90 MG/ML solution prefilled syringe Injection.     Currently Taking Medication(s): Yes, patient reports taking medication as directed.      Effectiveness of Medication: Effective.     Experiencing Side Effects: Not experiencing side effects, patient tolerating medication well.         Prior Authorization Status: Approved.     Financial Assistance Status: None needed at this time, co-pay reaming due from patient is $5.      Any Issues Identified: No issues expressed from patient, no issues processing refill.     Appropriate to Process Prescription(s):  Yes.  Medication refill will be dispensed from [facility] and delivered via home delivery services per patient request.     Counseling Offered: Patient previously counseled upon initiation of therapy, aware to contact pharmacy with any new questions or concerns.      Next Specialty Pharmacy Visit: 7/20/2020

## 2020-07-20 ENCOUNTER — SPECIALTY PHARMACY (OUTPATIENT)
Dept: PHARMACY | Facility: HOSPITAL | Age: 50
End: 2020-07-20

## 2020-07-23 ENCOUNTER — TELEPHONE (OUTPATIENT)
Dept: GASTROENTEROLOGY | Facility: CLINIC | Age: 50
End: 2020-07-23

## 2020-08-03 DIAGNOSIS — K50.919 CROHN'S DISEASE WITH COMPLICATION, UNSPECIFIED GASTROINTESTINAL TRACT LOCATION (HCC): ICD-10-CM

## 2020-08-19 ENCOUNTER — SPECIALTY PHARMACY (OUTPATIENT)
Dept: PHARMACY | Facility: HOSPITAL | Age: 50
End: 2020-08-19

## 2020-08-20 ENCOUNTER — E-VISIT (OUTPATIENT)
Dept: FAMILY MEDICINE CLINIC | Facility: TELEHEALTH | Age: 50
End: 2020-08-20

## 2020-08-20 DIAGNOSIS — Z71.89 EDUCATED ABOUT COVID-19 VIRUS INFECTION: Primary | ICD-10-CM

## 2020-08-20 PROCEDURE — 99421 OL DIG E/M SVC 5-10 MIN: CPT | Performed by: NURSE PRACTITIONER

## 2020-08-20 NOTE — PROGRESS NOTES
Evisit submission reviewed. I spent 5 minutes in the patient's chart giving advise on coronavirus testing.

## 2020-08-27 ENCOUNTER — OFFICE VISIT (OUTPATIENT)
Dept: GASTROENTEROLOGY | Facility: CLINIC | Age: 50
End: 2020-08-27

## 2020-08-27 VITALS
TEMPERATURE: 97.3 F | BODY MASS INDEX: 30.85 KG/M2 | SYSTOLIC BLOOD PRESSURE: 126 MMHG | WEIGHT: 227.8 LBS | DIASTOLIC BLOOD PRESSURE: 82 MMHG | HEART RATE: 88 BPM | HEIGHT: 72 IN | OXYGEN SATURATION: 97 %

## 2020-08-27 DIAGNOSIS — Z90.49 HISTORY OF RESECTION OF SMALL BOWEL: ICD-10-CM

## 2020-08-27 DIAGNOSIS — K50.00 CROHN'S DISEASE OF SMALL INTESTINE WITHOUT COMPLICATION (HCC): Primary | ICD-10-CM

## 2020-08-27 DIAGNOSIS — K75.81 NASH (NONALCOHOLIC STEATOHEPATITIS): ICD-10-CM

## 2020-08-27 DIAGNOSIS — Z79.899 HIGH RISK MEDICATION USE: ICD-10-CM

## 2020-08-27 PROCEDURE — 99214 OFFICE O/P EST MOD 30 MIN: CPT | Performed by: NURSE PRACTITIONER

## 2020-08-27 NOTE — PATIENT INSTRUCTIONS
Reviewed recent colonoscopy and MR enterography, summarized above.    Longstanding diagnosis of Crohn's disease status post resection.  Orders placed for surgical referral.    Continues to [place]ab subcu injections every 4 weeks.    COVID-19 recommendations based on CDC guidelines (please see CDC.gov for full list of recommendations) include     1.  Practice social distancing, social isolation, wearing mask and quarantine when appropriate (quarantine is defined as  individuals who have had potential exposure and who are asymptomatic to see if they develop symptoms for 14 days from the time of exposure).    *Social distancing is intentional increasing of space between people to prevent the spread of disease 6 to 10 feet.    *Social isolating is  sick people from healthy individuals to prevent spread.    2.  Wash your hands frequently with soap and water for at least 60 seconds especially after you have been in a public place, blowing your nose, coughing, sneezing and prior to and after eating or drinking  3.  If soap and water not readily available, use hand  that contains at least 60% alcohol.  Cover all surfaces of your hands and rub them together until they feel dry  4.  Avoid touching your nose, eyes, mouth with unwashed hands    Currently we do not recommend stopping medications for inflammatory bowel disease due to the risk of IBD flare which may result in hospitalization or the need for steroids.     Seek medical attention if you develop fever, cough, loss of sense of taste, smell or difficulty breathing.  Patients on biologic therapy for inflammatory bowel disease are at a moderate risk of severe illness from COVID-19 and it is strongly recommended that you strictly follow the CDC recommendations as discussed and if you are diagnosed with COVID-19, please contact our office for additional recommendations/guidance regarding your medications for inflammatory bowel disease.    Please  contact the office with questions or concerns or if we may be of any additional assistance otherwise contact the office with an update after surgical consult.    Continue milk thistle for fatty liver, maintain weight, avoiding weight gain and we will continue to monitor liver function test.

## 2020-08-27 NOTE — PROGRESS NOTES
Chief Complaint   Patient presents with   • Follow-up     post MRE and CLS       HPI  50-year-old male presents today for follow-up.  He has an established diagnosis of Crohn's ileocolitis, colon polyps, colon cancer in paternal grandfather, Macdonald and abnormal liver enzymes.  He has had previous appendectomy and 2 previous small bowel resections for Crohn's disease and the last was approximately 10 years ago.    He presents today for follow-up after EGD and colonoscopy July 10, 2020, results summarized below.  He then had MR of the abdomen August 3, 2020, also summarized below.      Patient continues on Stelara every 4 weeks.  He has been on Stelara for approximately 2 years and 4-week dosing for at least 1 year.    His main complaint during today's visit is fatigue.  This has been on going.  He is wondering if that is related to ongoing inflammation in his GI tract.  He also reports increased stress at work.  He continues on B12 injections but does not find any improvement with fatigue.  He would like to discuss surgical consult for possible resection if needed.    Bowel habits are regular without melena or hematochezia.  He denies nausea or vomiting.  Denies pain or trouble with swallowing.  His weight is stable.    Acid reflux is well controlled with daily Nexium.  He denies pain or trouble with swallowing.    He has been followed by Dr. Melvin Rojas for MACDONALD and abnormal liver enzymes.  He has had previous liver biopsy several years ago without fibrosis.  He denies yellowing of the skin, mental status changes, increasing abdominal girth or somnolence. He continues on milk thistle for MACDONALD.    Previous treatments include infliximab, adalimumab and azathioprine.    Patient continues to follow with dermatology with small noncancerous lesions removed on a regular basis.    He denies nonhealing skin lesions, depression, tobacco use or changes in his vision or eye infections.    REVIEW OF PREVIOUS RECORDS:  August 3, 2020  MR enterography.  Focal abnormal enhancement circumferentially and distal ileal loop with an area of developing narrowing of the lumen.  Area does not produce obstruction.  No significant inflammatory changes seen in the surrounding fat.    July 10, 2020 colonoscopy.  Diffuse area of moderately granular inflamed ulcerated and vascular pattern decreased mucosa of the terminal ileum.  Evidence of prior end-to-side colocolonic anastomosis in the ascending colon, patent.  Remainder of exam normal.  Neoterminal ileum with moderate villous blunting with moderate chronic interstitial inflammation with eosinophils no active inflammation nor granulomatous inflammation identified, no intestinal parasites.  Random colon biopsies from the ascending colon with focal changes suggesting developing hyperplastic polyp.  Transverse and descending colon biopsies with normal crypt architecture.    MR enterography May 2018 with stable inflammation with no changes or worrisome findings.  EGD March 13, 2019 with severe inflammation of the ileum.    Review of Systems   Constitutional: Negative for appetite change, chills, diaphoresis, fatigue, fever and unexpected weight change.   HENT: Negative for dental problem, ear pain, mouth sores, rhinorrhea, sore throat and voice change.    Eyes: Negative for pain, redness and visual disturbance.   Respiratory: Negative for cough, chest tightness and wheezing.    Cardiovascular: Negative for chest pain, palpitations and leg swelling.   Endocrine: Negative for cold intolerance, heat intolerance, polydipsia, polyphagia and polyuria.   Genitourinary: Negative for dysuria, frequency, hematuria and urgency.   Musculoskeletal: Negative for arthralgias, back pain, joint swelling, myalgias and neck pain.   Skin: Negative for rash.   Allergic/Immunologic: Negative for environmental allergies, food allergies and immunocompromised state.   Neurological: Negative for dizziness, seizures, weakness, numbness and  headaches.   Hematological: Does not bruise/bleed easily.   Psychiatric/Behavioral: Negative for sleep disturbance. The patient is not nervous/anxious.         Problem List:    Patient Active Problem List   Diagnosis   • Crohn's disease of small intestine without complication (CMS/HCC)   • High risk medication use   • MACDONALD (nonalcoholic steatohepatitis)       Medical History:    Past Medical History:   Diagnosis Date   • Crohn's disease (CMS/HCC)         Social History:    Social History     Socioeconomic History   • Marital status:      Spouse name: Not on file   • Number of children: Not on file   • Years of education: Not on file   • Highest education level: Not on file   Tobacco Use   • Smoking status: Never Smoker   • Smokeless tobacco: Never Used   Substance and Sexual Activity   • Alcohol use: Yes   • Drug use: Never   • Sexual activity: Defer       Family History:   Family History   Problem Relation Age of Onset   • Colon cancer Paternal Grandfather    • Colon polyps Other        Surgical History:   Past Surgical History:   Procedure Laterality Date   • COLONOSCOPY  2018   • UPPER GASTROINTESTINAL ENDOSCOPY           Current Outpatient Medications:   •  cyanocobalamin 1000 MCG/ML injection, INJECT 1ML INTRAMUSCULARLY ONCE A MONTH, Disp: , Rfl:   •  esomeprazole (nexIUM) 40 MG capsule, Take 40 mg by mouth Every Morning Before Breakfast., Disp: , Rfl:   •  Loratadine (CLARITIN) 10 MG capsule, Take  by mouth., Disp: , Rfl:   •  Milk Thistle 200 MG capsule, , Disp: , Rfl:   •  Multiple Vitamins-Minerals (MULTIVITAMIN ADULT EXTRA C PO), MULTIVITAMIN, Disp: , Rfl:   •  Ustekinumab (STELARA) 90 MG/ML solution prefilled syringe Injection, Inject 90 mg under the skin into the appropriate area as directed Every 28 (Twenty-Eight) Days., Disp: 1 mL, Rfl: 11  •  vitamin E 100 UNIT capsule, Take 100 Units by mouth Daily., Disp: , Rfl:   •  Zinc 100 MG tablet, , Disp: , Rfl:     Allergies:  Sulfa antibiotics    The  following portions of the patient's history were reviewed and updated as appropriate: allergies, current medications, past family history, past medical history, past social history, past surgical history and problem list.    Vitals:    08/27/20 0740   BP: 126/82   Pulse: 88   Temp: 97.3 °F (36.3 °C)   SpO2: 97%         08/27/20  0740   Weight: 103 kg (227 lb 12.8 oz)     Body mass index is 30.9 kg/m².    Physical Exam   Constitutional: He is oriented to person, place, and time. He appears well-developed and well-nourished. No distress.   Pulmonary/Chest: Effort normal. No respiratory distress.   Neurological: He is alert and oriented to person, place, and time.   Skin: Skin is warm and dry. He is not diaphoretic. No erythema. No pallor.   Vitals reviewed.        Assessment/ Plan  Margarito was seen today for follow-up.    Diagnoses and all orders for this visit:    Crohn's disease of small intestine without complication (CMS/HCC)  -     Ambulatory Referral to General Surgery    High risk medication use    MACDONALD (nonalcoholic steatohepatitis)    History of resection of small bowel  -     Ambulatory Referral to General Surgery         No follow-ups on file.    Patient Instructions   Reviewed recent colonoscopy and MR enterography, summarized above.    Longstanding diagnosis of Crohn's disease status post resection.  Orders placed for surgical referral.    Continues to [place] subcu injections every 4 weeks.    COVID-19 recommendations based on CDC guidelines (please see CDC.gov for full list of recommendations) include     1.  Practice social distancing, social isolation, wearing mask and quarantine when appropriate (quarantine is defined as  individuals who have had potential exposure and who are asymptomatic to see if they develop symptoms for 14 days from the time of exposure).    *Social distancing is intentional increasing of space between people to prevent the spread of disease 6 to 10 feet.    *Social  isolating is  sick people from healthy individuals to prevent spread.    2.  Wash your hands frequently with soap and water for at least 60 seconds especially after you have been in a public place, blowing your nose, coughing, sneezing and prior to and after eating or drinking  3.  If soap and water not readily available, use hand  that contains at least 60% alcohol.  Cover all surfaces of your hands and rub them together until they feel dry  4.  Avoid touching your nose, eyes, mouth with unwashed hands    Currently we do not recommend stopping medications for inflammatory bowel disease due to the risk of IBD flare which may result in hospitalization or the need for steroids.     Seek medical attention if you develop fever, cough, loss of sense of taste, smell or difficulty breathing.  Patients on biologic therapy for inflammatory bowel disease are at a moderate risk of severe illness from COVID-19 and it is strongly recommended that you strictly follow the CDC recommendations as discussed and if you are diagnosed with COVID-19, please contact our office for additional recommendations/guidance regarding your medications for inflammatory bowel disease.    Please contact the office with questions or concerns or if we may be of any additional assistance otherwise contact the office with an update after surgical consult.    Continue milk thistle for fatty liver, maintain weight, avoiding weight gain and we will continue to monitor liver function test.           Discussion:  Patient with complaints of fatigue that has been persistent despite dietary and lifestyle modifications.  He continues on B12 injections given history of ileal resection x2.  Reviewed colonoscopy and MR enterography findings.  Given chronic inflammatory changes and narrowing, recommend surgical referral to establish care to discuss surgical options.  Prior to today's visit I reviewed MR enterography findings and colonoscopy  findings with Dr. Thompson.  He felt there had been a small improvement in symptoms but overall chronic colitis is present and patient will ultimately need resection of the active area at some point.  Given patient's fatigue, he would like to discuss surgical options with surgeon and consider resection if fatigue does not improve which seems very reasonable as chronic inflammation may be contributing to fatigue as discussed.    We will continue Stelara every 4 weeks, recommendations regarding COVID-19 pandemic and immunosuppressive medications such as Stelara reviewed in detail with patient during today's visit.  Recommend continuing medication, strict adherence to CDC guidelines that include masking, social distancing, hand hygiene and if patient is diagnosed with COVID-19, he is agreeable to contact the office and we will make recommendations regarding Stelara at that time.    Patient verbalized agreement and understanding, support and reassurance provided.

## 2020-09-01 NOTE — PROGRESS NOTES
Specialty Pharmacy Note      Name:  Margarito Miles  :  1970  Date:  2020         Past Medical History:   Diagnosis Date   • Crohn's disease (CMS/HCC)        Past Surgical History:   Procedure Laterality Date   • COLONOSCOPY  2018   • UPPER GASTROINTESTINAL ENDOSCOPY         Social History     Socioeconomic History   • Marital status:      Spouse name: Not on file   • Number of children: Not on file   • Years of education: Not on file   • Highest education level: Not on file   Tobacco Use   • Smoking status: Never Smoker   • Smokeless tobacco: Never Used   Substance and Sexual Activity   • Alcohol use: Yes   • Drug use: Never   • Sexual activity: Defer       Family History   Problem Relation Age of Onset   • Colon cancer Paternal Grandfather    • Colon polyps Other        Allergies   Allergen Reactions   • Sulfa Antibiotics Hives       Current Outpatient Medications   Medication Sig Dispense Refill   • cyanocobalamin 1000 MCG/ML injection INJECT 1ML INTRAMUSCULARLY ONCE A MONTH     • esomeprazole (nexIUM) 40 MG capsule Take 40 mg by mouth Every Morning Before Breakfast.     • Loratadine (CLARITIN) 10 MG capsule Take  by mouth.     • Milk Thistle 200 MG capsule      • Multiple Vitamins-Minerals (MULTIVITAMIN ADULT EXTRA C PO) MULTIVITAMIN     • Ustekinumab (STELARA) 90 MG/ML solution prefilled syringe Injection Inject 90 mg under the skin into the appropriate area as directed Every 28 (Twenty-Eight) Days. 1 mL 11   • vitamin E 100 UNIT capsule Take 100 Units by mouth Daily.     • Zinc 100 MG tablet        No current facility-administered medications for this visit.          LABORATORY:    Lab Results   Component Value Date    WBC 6.89 2020    HGB 14.0 2020    HCT 41.5 2020    MCV 90.4 2020    RDW 13.0 2020     2020    NEUTRORELPCT 70.9 2020    LYMPHORELPCT 18.7 (L) 2020    MONORELPCT 8.7 2020    EOSRELPCT 1.0 2020     BASORELPCT 0.4 02/19/2020    NEUTROABS 4.88 02/19/2020    LYMPHSABS 1.29 02/19/2020       Lab Results   Component Value Date     05/28/2019    K 3.8 05/28/2019    CO2 24 05/28/2019     05/28/2019    BUN 18 05/28/2019    CREATININE 1.3 (H) 05/28/2019    GLUCOSE 88 05/28/2019    CALCIUM 9.2 05/28/2019    ALKPHOS 77 02/19/2020    AST 20 02/19/2020    ALT 30 02/19/2020    BILITOT 0.7 02/19/2020    ALBUMIN 4.30 02/19/2020    PROTEINTOT 7.1 05/28/2019       No results found for: LDH, URICACID     Imaging Results (Last 24 Hours)     ** No results found for the last 24 hours. **          ASSESSMENT/PLAN:    Patient Update Assessment (new medications, allergies, medical history): Reviewed     Medication(s): Stelara 90 MG/ML solution prefilled syringe Injection.     Currently Taking Medication(s): Yes, patient reports taking medication as directed.      Effectiveness of Medication: Effective.     Experiencing Side Effects: Not experiencing side effects, patient tolerating medication well.         Prior Authorization Status: Approved.     Financial Assistance Status: None needed at this time, co-pay reaming due from patient is $5.      Any Issues Identified: No issues expressed from patient, no issues processing refill.     Appropriate to Process Prescription(s):  Yes.  Medication refill will be dispensed from [facility] and delivered via home delivery services per patient request.     Counseling Offered: Patient previously counseled upon initiation of therapy, aware to contact pharmacy with any new questions or concerns.     Next Specialty Pharmacy Visit:  08/19/2020               no

## 2020-09-10 ENCOUNTER — OFFICE VISIT (OUTPATIENT)
Dept: SURGERY | Facility: CLINIC | Age: 50
End: 2020-09-10

## 2020-09-10 VITALS — BODY MASS INDEX: 30.75 KG/M2 | HEIGHT: 72 IN | WEIGHT: 227 LBS

## 2020-09-10 DIAGNOSIS — K50.00 CROHN'S DISEASE OF SMALL INTESTINE WITHOUT COMPLICATION (HCC): Primary | ICD-10-CM

## 2020-09-10 PROCEDURE — 99244 OFF/OP CNSLTJ NEW/EST MOD 40: CPT | Performed by: SURGERY

## 2020-09-10 NOTE — PROGRESS NOTES
SUMMARY (A/P):    50-year-old gentleman with Crohn's disease first diagnosed in 1999.  He has had 2 open ileocolic resections related to this.  Currently his principal symptoms are fatigue and lethargy with minimal gastrointestinal symptomatology.  I do not feel based on review of his colonoscopy and MR enterography that he is at a point that he needs to consider surgical resection.  I discussed symptomatology to look for in terms of impending need for surgical resection.  He takes vitamin B12 injections somewhat sporadically and this could be part of the issue with his fatigue and lethargy.  I have prescribed Nascobal once weekly to see if this will provide a more consistent B12 supplementation and help with his energy levels.  He will call if he develops any progressive symptoms.      CC:    Crohn's disease, referred for consultation by Dr. Thompson    HPI:    50-year-old gentleman diagnosed with Crohn's disease in 1999.  He has had 2 open ileocolic resections, most recently in 2012.  Currently his symptoms consist of rare mild crampy abdominal pain associated with 4-6 loose bowel movements per day.  Also reports chronic fatigue and lethargy.  Symptoms have insidiously progressed over the last 2 to 3 months.  No nausea or vomiting.  No weight loss.  No change in bowel habits.    RADIOLOGY/ENDOSCOPY:    • MR enterography [facility] 8/3/2020: Focal abnormal enhancement circumferentially and distal ileal loop with an area of developing narrowing of the lumen.  No evidence of obstruction.  No significant inflammatory change.  • Colonoscopy 7/10/2020: Diffuse area of mucosa and terminal ileum was moderately granular, inflamed, ulcerated and vascular pattern decreased.  Biopsies taken.    LABS:    • Pathology from terminal ileal biopsy 7/10/2020: Moderate villous blunting with moderate chronic intestinal inflammation with eosinophilia.  No active inflammation.  No granulomatous change.  Random colon biopsies normal  crypt architecture.    PHYSICAL EXAM:   • Constitutional: Well-developed well-nourished, no acute distress  • Vital signs: Weight 227 pounds, height 72 inches, BMI 30.8  • Eyes: Conjunctiva normal, sclera nonicteric  • ENMT: Hearing grossly normal, oral mucosa moist  • Neck: Supple, no palpable mass, trachea midline  • Respiratory: Clear to auscultation, normal inspiratory effort  • Cardiovascular: Regular rate, no murmur, no carotid bruit, no peripheral edema, no jugular venous distention  • Gastrointestinal: Soft, minimal right abdominal tenderness, no palpable mass, no hepatosplenomegaly, negative for hernia, well-healed midline incision, well-healed right lower quadrant transverse incision  • Lymphatics (palpable nodes):  cervical-negative, axillary-negative, inguinal-negative  • Skin:  Warm, dry, no rash on visualized skin surfaces  • Musculoskeletal: Symmetric strength, normal gait  • Psychiatric: Alert and oriented ×3, normal affect     ALLERGIES:   • Sulfa-hives    MEDICATIONS:   • Vitamin B12 injection  • Nexium  • Claritin  • Multivitamin  • Stelara    PMH:    • Crohn's disease  • Asthma (rare occurrences of issues)  • Gastroesophageal reflux disease  • Possible nonalcoholic steatohepatitis  • Sleep apnea, uses BiPAP    PSH:    • Open ileocolic resection 2012, Dr. Bairon Garcia  • Open ileocolic resection 1999, Dr. Bairon Garcia  • Open appendectomy 1989, Dr. Nikky Cloud    FAMILY HISTORY:    • Negative for inflammatory bowel disease  • Paternal grandfather with colon cancer    SOCIAL HISTORY:   • Denies tobacco use  • Occasional alcohol use    ROS:    Influenza Like Illness: no fever, no  cough, no  sore throat, no  body aches, no loss of sense of taste or smell, no known exposure to person with Covid-19.  All other systems reviewed and negative other than presenting complaints.    XAVIER DONALD M.D.

## 2020-09-11 ENCOUNTER — MEDICATION THERAPY MANAGEMENT (OUTPATIENT)
Dept: GASTROENTEROLOGY | Facility: CLINIC | Age: 50
End: 2020-09-11

## 2020-09-14 ENCOUNTER — PATIENT MESSAGE (OUTPATIENT)
Dept: SURGERY | Facility: CLINIC | Age: 50
End: 2020-09-14

## 2020-09-14 NOTE — TELEPHONE ENCOUNTER
Please advise if you will change to alternate since the Nascobal will cost the patient $700 out of pocket.

## 2020-09-15 ENCOUNTER — SPECIALTY PHARMACY (OUTPATIENT)
Dept: PHARMACY | Facility: HOSPITAL | Age: 50
End: 2020-09-15

## 2020-09-17 ENCOUNTER — TELEPHONE (OUTPATIENT)
Dept: SURGERY | Facility: CLINIC | Age: 50
End: 2020-09-17

## 2020-09-17 DIAGNOSIS — K50.00 CROHN'S DISEASE OF SMALL INTESTINE WITHOUT COMPLICATION (HCC): Primary | ICD-10-CM

## 2020-09-17 RX ORDER — CYANOCOBALAMIN 1000 UG/ML
1000 INJECTION, SOLUTION INTRAMUSCULAR; SUBCUTANEOUS WEEKLY
Qty: 1000 ML | Refills: 7 | Status: SHIPPED | OUTPATIENT
Start: 2020-09-17 | End: 2021-10-14 | Stop reason: SDUPTHER

## 2020-09-17 NOTE — TELEPHONE ENCOUNTER
There is no option in Epic for injectable B12 as outpatient.  Please call in:     Vitamin B12 1000 mcg subcutaneous injection once per week for 8 weeks    After seven weeks, he needs these labs:  1. Vitamin B12 level  2. Methylmalonic acid level  3. CBC  4. Iron profile

## 2020-10-13 ENCOUNTER — SPECIALTY PHARMACY (OUTPATIENT)
Dept: PHARMACY | Facility: HOSPITAL | Age: 50
End: 2020-10-13

## 2020-10-16 NOTE — PROGRESS NOTES
Specialty Pharmacy Note      Name:  Margarito Miles  :  1970  Date:  2020         Past Medical History:   Diagnosis Date   • Asthma     as a child   • Crohn's disease (CMS/HCC)    • GERD (gastroesophageal reflux disease)    • Nonalcoholic fatty liver disease without nonalcoholic steatohepatitis (MACDONALD)    • Sleep apnea     Bi-PAP       Past Surgical History:   Procedure Laterality Date   • APPENDECTOMY N/A     Dr. Nikky Cloud   • COLON SURGERY N/A , 2012 Dr. Dominic Campo,  Dr. Bairon Garcia   • COLONOSCOPY     • COLONOSCOPY N/A 07/10/2020    Dr. Jesús Thompson, [facility]   • ENDOSCOPY N/A     Dr. Jesús Thompson   • ORIF ANKLE FRACTURE Left     Dr. Cesar Patton       Social History     Socioeconomic History   • Marital status:      Spouse name: Not on file   • Number of children: Not on file   • Years of education: Not on file   • Highest education level: Not on file   Tobacco Use   • Smoking status: Former Smoker     Types: Cigarettes     Quit date:      Years since quittin.8   • Smokeless tobacco: Never Used   • Tobacco comment: smoked 1 ppd for 8 years   Substance and Sexual Activity   • Alcohol use: Yes   • Drug use: Never   • Sexual activity: Defer       Family History   Problem Relation Age of Onset   • Colon cancer Paternal Grandfather    • Abnormal EKG Paternal Grandfather    • Lung cancer Mother    • Colon polyps Other        Allergies   Allergen Reactions   • Sulfa Antibiotics Hives and Other (See Comments)     Fever, chills, flush       Current Outpatient Medications   Medication Sig Dispense Refill   • cyanocobalamin 1000 MCG/ML injection Inject 1 mL under the skin into the appropriate area as directed 1 (One) Time Per Week. 1000 mL 7   • esomeprazole (nexIUM) 40 MG capsule Take 40 mg by mouth Every Morning Before Breakfast.     • KRILL OIL PO Take 1 tablet by mouth Daily.     • Loratadine (CLARITIN) 10 MG capsule Take 10 mg by  mouth Daily.     • Milk Thistle 200 MG capsule Take 200 mg by mouth Daily.     • Multiple Vitamins-Minerals (MULTIVITAMIN ADULT EXTRA C PO) Take 1 tablet by mouth Daily.     • Ustekinumab (STELARA) 90 MG/ML solution prefilled syringe Injection Inject 90 mg under the skin into the appropriate area as directed Every 28 (Twenty-Eight) Days. 1 mL 11   • vitamin E 100 UNIT capsule Take 100 Units by mouth Daily.     • Zinc 100 MG tablet Take 1 tablet by mouth Daily.       No current facility-administered medications for this visit.          LABORATORY:    Lab Results   Component Value Date    WBC 6.89 02/19/2020    HGB 14.0 02/19/2020    HCT 41.5 02/19/2020    MCV 90.4 02/19/2020    RDW 13.0 02/19/2020     02/19/2020    NEUTRORELPCT 70.9 02/19/2020    LYMPHORELPCT 18.7 (L) 02/19/2020    MONORELPCT 8.7 02/19/2020    EOSRELPCT 1.0 02/19/2020    BASORELPCT 0.4 02/19/2020    NEUTROABS 4.88 02/19/2020    LYMPHSABS 1.29 02/19/2020       Lab Results   Component Value Date     05/28/2019    K 3.8 05/28/2019    CO2 24 05/28/2019     05/28/2019    BUN 18 05/28/2019    CREATININE 1.3 (H) 05/28/2019    GLUCOSE 88 05/28/2019    CALCIUM 9.2 05/28/2019    ALKPHOS 77 02/19/2020    AST 20 02/19/2020    ALT 30 02/19/2020    BILITOT 0.7 02/19/2020    ALBUMIN 4.30 02/19/2020    PROTEINTOT 7.1 05/28/2019       No results found for: LDH, URICACID     Imaging Results (Last 24 Hours)     ** No results found for the last 24 hours. **          ASSESSMENT/PLAN:    Patient Update Assessment (new medications, allergies, medical history): Reviewed     Medication(s): Stelara 90 MG/ML solution prefilled syringe Injection.     Currently Taking Medication(s): Yes, patient reports taking medication as directed.      Effectiveness of Medication: Effective.     Experiencing Side Effects: Not experiencing side effects, patient tolerating medication well.         Prior Authorization Status: Approved.     Financial Assistance Status: None  needed at this time, co-pay reaming due from patient is $5.      Any Issues Identified: No issues expressed from patient, no issues processing refill.     Appropriate to Process Prescription(s):  Yes.  Medication refill will be dispensed from [facility] and delivered via home delivery services per patient request.     Counseling Offered: Patient previously counseled upon initiation of therapy, aware to contact pharmacy with any new questions or concerns.     Next Specialty Pharmacy Visit:  Scheduled in four weeks.

## 2020-10-19 ENCOUNTER — OFFICE VISIT (OUTPATIENT)
Dept: SURGERY | Facility: CLINIC | Age: 50
End: 2020-10-19

## 2020-10-19 VITALS — WEIGHT: 227 LBS | HEIGHT: 72 IN | BODY MASS INDEX: 30.75 KG/M2

## 2020-10-19 DIAGNOSIS — K50.012 CROHN'S DISEASE OF SMALL INTESTINE WITH INTESTINAL OBSTRUCTION (HCC): Primary | ICD-10-CM

## 2020-10-19 PROCEDURE — 99214 OFFICE O/P EST MOD 30 MIN: CPT | Performed by: SURGERY

## 2020-10-19 RX ORDER — CEFAZOLIN SODIUM 2 G/100ML
2 INJECTION, SOLUTION INTRAVENOUS ONCE
Status: CANCELLED | OUTPATIENT
Start: 2020-10-27 | End: 2020-10-19

## 2020-10-20 ENCOUNTER — TRANSCRIBE ORDERS (OUTPATIENT)
Dept: PREADMISSION TESTING | Facility: HOSPITAL | Age: 50
End: 2020-10-20

## 2020-10-20 DIAGNOSIS — Z01.818 OTHER SPECIFIED PRE-OPERATIVE EXAMINATION: Primary | ICD-10-CM

## 2020-10-20 NOTE — PROGRESS NOTES
SUMMARY (A/P):    50-year-old gentleman with worsening Crohn's related symptoms.  Based on his worsening symptoms and prior work-up, he does wish to proceed with open ileocolic resection.  He understands the nature of the procedure and the risks including but not limited to bleeding, infection, and anastomotic leak requiring reoperation and temporary ostomy.  He understands risks associated with surgery are increased given that he has had 2 prior ileocolic resections.      CC:    Abdominal pain    HPI:    50-year-old gentleman whom I saw initially on 9/10/2020 for Crohn's disease.  His symptoms at that time were relatively mild and he did not feel were bothersome enough at that point to proceed with surgical resection.  Since then he has had worsening persistent nausea and moderate right periumbilical abdominal pain that is worse with food intake.  Food also worsens persistent diarrhea.  He has significant fatigue and B12 injections that he has been doing since his visit here have not helped.    RADIOLOGY/ENDOSCOPY:    · MR enterography [facility] imaging 8/3/2020: Focal abnormal enhancement circumferentially and distal ileal loop with an area of developing narrowing of the lumen.  No evidence of obstruction.  No significant inflammatory change.  · Colonoscopy 7/10/2020: Diffuse area of mucosa and terminal ileum was moderately granular, inflamed, ulcerated and vascular pattern decreased.  Biopsies taken.    LABS:    · Pathology from terminal ileal biopsy 7/10/2020: Moderate villous blunting with moderate chronic intestinal inflammation with eosinophilia.  No active inflammation.  No granulomatous change.  Random colon biopsies normal crypt architecture    PHYSICAL EXAM:   • Constitutional: Well-developed well-nourished, no acute distress  • Vital signs: Weight 227 pounds, height 72 inches, BMI 30.8  • Eyes: Conjunctiva normal, sclera nonicteric  • ENMT: Hearing grossly normal, oral mucosa moist  • Neck: Supple, no  palpable mass, trachea midline  • Respiratory: Normal inspiratory effort  • Cardiovascular: Regular rate, no murmur, no carotid bruit, no peripheral edema, no jugular venous distention  • Gastrointestinal: Soft, nontender, no palpable mass, no hepatosplenomegaly  • Skin:  Warm, dry, no rash on visualized skin surfaces  • Musculoskeletal: Symmetric strength, normal gait  • Psychiatric: Alert and oriented ×3, normal affect     ALLERGIES:   • Sulfa-hives    MEDICATIONS:   • Vitamin B12  • Nexium  • Claritin  • Multivitamin  • Stelara    PMH:    • Crohn's disease  • Asthma  • Gastroesophageal reflux disease  • Nonalcoholic steatohepatitis  • Sleep apnea, uses BiPAP    PSH:    • Open ileocolic resection 2012, Dr. Bairon Garcia  • Open ileocolic resection 1999, Dr. Bairon Garcia  • Open appendectomy 1989, Dr. Nikky Cloud    FAMILY HISTORY:    • Negative for inflammatory bowel disease  • Paternal grandfather with colon cancer    SOCIAL HISTORY:   • Denies tobacco use  • Occasional alcohol use    ROS:    Influenza Like Illness: no fever, no  cough, no  sore throat, no  body aches, no loss of sense of taste or smell, no known exposure to person with Covid-19.  All other systems reviewed and negative other than presenting complaints.    XAVIER DONALD M.D.

## 2020-10-22 ENCOUNTER — APPOINTMENT (OUTPATIENT)
Dept: PREADMISSION TESTING | Facility: HOSPITAL | Age: 50
End: 2020-10-22

## 2020-10-22 VITALS
WEIGHT: 227 LBS | BODY MASS INDEX: 30.75 KG/M2 | HEART RATE: 78 BPM | SYSTOLIC BLOOD PRESSURE: 145 MMHG | OXYGEN SATURATION: 99 % | RESPIRATION RATE: 20 BRPM | TEMPERATURE: 98.8 F | HEIGHT: 72 IN | DIASTOLIC BLOOD PRESSURE: 90 MMHG

## 2020-10-22 DIAGNOSIS — K50.00 CROHN'S DISEASE OF SMALL INTESTINE WITHOUT COMPLICATION (HCC): ICD-10-CM

## 2020-10-22 LAB
ANION GAP SERPL CALCULATED.3IONS-SCNC: 8.8 MMOL/L (ref 5–15)
BASOPHILS # BLD AUTO: 0.02 10*3/MM3 (ref 0–0.2)
BASOPHILS NFR BLD AUTO: 0.4 % (ref 0–1.5)
BUN SERPL-MCNC: 12 MG/DL (ref 6–20)
BUN/CREAT SERPL: 11 (ref 7–25)
CALCIUM SPEC-SCNC: 9.2 MG/DL (ref 8.6–10.5)
CHLORIDE SERPL-SCNC: 104 MMOL/L (ref 98–107)
CO2 SERPL-SCNC: 27.2 MMOL/L (ref 22–29)
CREAT SERPL-MCNC: 1.09 MG/DL (ref 0.76–1.27)
DEPRECATED RDW RBC AUTO: 41.9 FL (ref 37–54)
DEPRECATED RDW RBC AUTO: 42.2 FL (ref 37–54)
EOSINOPHIL # BLD AUTO: 0.1 10*3/MM3 (ref 0–0.4)
EOSINOPHIL NFR BLD AUTO: 2 % (ref 0.3–6.2)
ERYTHROCYTE [DISTWIDTH] IN BLOOD BY AUTOMATED COUNT: 13.6 % (ref 12.3–15.4)
ERYTHROCYTE [DISTWIDTH] IN BLOOD BY AUTOMATED COUNT: 13.8 % (ref 12.3–15.4)
GFR SERPL CREATININE-BSD FRML MDRD: 72 ML/MIN/1.73
GLUCOSE SERPL-MCNC: 92 MG/DL (ref 65–99)
HCT VFR BLD AUTO: 39.5 % (ref 37.5–51)
HCT VFR BLD AUTO: 39.6 % (ref 37.5–51)
HGB BLD-MCNC: 13.7 G/DL (ref 13–17.7)
HGB BLD-MCNC: 13.7 G/DL (ref 13–17.7)
IMM GRANULOCYTES # BLD AUTO: 0.02 10*3/MM3 (ref 0–0.05)
IMM GRANULOCYTES NFR BLD AUTO: 0.4 % (ref 0–0.5)
IRON 24H UR-MRATE: 132 MCG/DL (ref 59–158)
IRON SATN MFR SERPL: 27 % (ref 20–50)
LYMPHOCYTES # BLD AUTO: 1.27 10*3/MM3 (ref 0.7–3.1)
LYMPHOCYTES NFR BLD AUTO: 25.3 % (ref 19.6–45.3)
MCH RBC QN AUTO: 29.7 PG (ref 26.6–33)
MCH RBC QN AUTO: 29.8 PG (ref 26.6–33)
MCHC RBC AUTO-ENTMCNC: 34.6 G/DL (ref 31.5–35.7)
MCHC RBC AUTO-ENTMCNC: 34.7 G/DL (ref 31.5–35.7)
MCV RBC AUTO: 85.9 FL (ref 79–97)
MCV RBC AUTO: 86.1 FL (ref 79–97)
MONOCYTES # BLD AUTO: 0.44 10*3/MM3 (ref 0.1–0.9)
MONOCYTES NFR BLD AUTO: 8.8 % (ref 5–12)
NEUTROPHILS NFR BLD AUTO: 3.16 10*3/MM3 (ref 1.7–7)
NEUTROPHILS NFR BLD AUTO: 63.1 % (ref 42.7–76)
NRBC BLD AUTO-RTO: 0 /100 WBC (ref 0–0.2)
PLATELET # BLD AUTO: 199 10*3/MM3 (ref 140–450)
PLATELET # BLD AUTO: 202 10*3/MM3 (ref 140–450)
PMV BLD AUTO: 10.2 FL (ref 6–12)
PMV BLD AUTO: 10.5 FL (ref 6–12)
POTASSIUM SERPL-SCNC: 4.2 MMOL/L (ref 3.5–5.2)
RBC # BLD AUTO: 4.59 10*6/MM3 (ref 4.14–5.8)
RBC # BLD AUTO: 4.61 10*6/MM3 (ref 4.14–5.8)
SODIUM SERPL-SCNC: 140 MMOL/L (ref 136–145)
TIBC SERPL-MCNC: 496 MCG/DL (ref 298–536)
TRANSFERRIN SERPL-MCNC: 333 MG/DL (ref 200–360)
VIT B12 BLD-MCNC: 799 PG/ML (ref 211–946)
WBC # BLD AUTO: 5.01 10*3/MM3 (ref 3.4–10.8)
WBC # BLD AUTO: 5.1 10*3/MM3 (ref 3.4–10.8)

## 2020-10-22 PROCEDURE — 85025 COMPLETE CBC W/AUTO DIFF WBC: CPT | Performed by: SURGERY

## 2020-10-22 PROCEDURE — 83540 ASSAY OF IRON: CPT | Performed by: SURGERY

## 2020-10-22 PROCEDURE — 80048 BASIC METABOLIC PNL TOTAL CA: CPT | Performed by: SURGERY

## 2020-10-22 PROCEDURE — 93010 ELECTROCARDIOGRAM REPORT: CPT | Performed by: INTERNAL MEDICINE

## 2020-10-22 PROCEDURE — 93005 ELECTROCARDIOGRAM TRACING: CPT

## 2020-10-22 PROCEDURE — 84466 ASSAY OF TRANSFERRIN: CPT | Performed by: SURGERY

## 2020-10-22 PROCEDURE — 36415 COLL VENOUS BLD VENIPUNCTURE: CPT

## 2020-10-22 PROCEDURE — 82607 VITAMIN B-12: CPT | Performed by: SURGERY

## 2020-10-22 PROCEDURE — 83921 ORGANIC ACID SINGLE QUANT: CPT | Performed by: SURGERY

## 2020-10-22 RX ORDER — CHLORHEXIDINE GLUCONATE 500 MG/1
1 CLOTH TOPICAL
COMMUNITY
End: 2020-10-31 | Stop reason: HOSPADM

## 2020-10-22 RX ORDER — VITAMIN E 268 MG
400 CAPSULE ORAL DAILY
COMMUNITY

## 2020-10-22 NOTE — DISCHARGE INSTRUCTIONS
Take the following medications the morning of surgery:    claritin  nexium    If you are on prescription narcotic pain medication to control your pain you may also take that medication the morning of surgery.    General Instructions:  • Do not eat solid food after midnight the night before surgery.  • You may drink clear liquids day of surgery but must stop at least one hour before your hospital arrival time.  • It is beneficial for you to have a clear drink that contains carbohydrates the day of surgery.  We suggest a 12 to 20 ounce bottle of Gatorade or Powerade for non-diabetic patients or a 12 to 20 ounce bottle of G2 or Powerade Zero for diabetic patients. (Pediatric patients, are not advised to drink a 12 to 20 ounce carbohydrate drink)    Clear liquids are liquids you can see through.  Nothing red in color.     Plain water                               Sports drinks  Sodas                                   Gelatin (Jell-O)  Fruit juices without pulp such as white grape juice and apple juice  Popsicles that contain no fruit or yogurt  Tea or coffee (no cream or milk added)  Gatorade / Powerade  G2 / Powerade Zero    • Infants may have breast milk up to four hours before surgery.  • Infants drinking formula may drink formula up to six hours before surgery.   • Patients who avoid smoking, chewing tobacco and alcohol for 4 weeks prior to surgery have a reduced risk of post-operative complications.  Quit smoking as many days before surgery as you can.  • Do not smoke, use chewing tobacco or drink alcohol the day of surgery.   • If applicable bring your C-PAP/ BI-PAP machine.  • Bring any papers given to you in the doctor’s office.  • Wear clean comfortable clothes.  • Do not wear contact lenses, false eyelashes or make-up.  Bring a case for your glasses.   • Bring crutches or walker if applicable.  • Remove all piercings.  Leave jewelry and any other valuables at home.  • Hair extensions with metal clips must be  removed prior to surgery.  • The Pre-Admission Testing nurse will instruct you to bring medications if unable to obtain an accurate list in Pre-Admission Testing.        If you were given a blood bank ID arm band remember to bring it with you the day of surgery.    Preventing a Surgical Site Infection:  • For 2 to 3 days before surgery, avoid shaving with a razor because the razor can irritate skin and make it easier to develop an infection.    • Any areas of open skin can increase the risk of a post-operative wound infection by allowing bacteria to enter and travel throughout the body.  Notify your surgeon if you have any skin wounds / rashes even if it is not near the expected surgical site.  The area will need assessed to determine if surgery should be delayed until it is healed.  • The night prior to surgery shower using a fresh bar of anti-bacterial soap (such as Dial) and clean washcloth.  Sleep in a clean bed with clean clothing.  Do not allow pets to sleep with you.  • Shower on the morning of surgery using a fresh bar of anti-bacterial soap (such as Dial) and clean washcloth.  Dry with a clean towel and dress in clean clothing.  • Ask your surgeon if you will be receiving antibiotics prior to surgery.  • Make sure you, your family, and all healthcare providers clean their hands with soap and water or an alcohol based hand  before caring for you or your wound.    Day of surgery:10/27/2020   1000  Your arrival time is approximately two hours before your scheduled surgery time.  Upon arrival, a Pre-op nurse and Anesthesiologist will review your health history, obtain vital signs, and answer questions you may have.  The only belongings needed at this time will be a list of your home medications and if applicable your C-PAP/BI-PAP machine.  If you are staying overnight your family can leave the rest of your belongings in the car and bring them to your room later.  A Pre-op nurse will start an IV and you  may receive medication in preparation for surgery, including something to help you relax.  While you are in surgery your family should notify the waiting room  if they leave the waiting room area and provide a contact phone number.    Please be aware that surgery does come with discomfort.  We want to make every effort to control your discomfort so please discuss any uncontrolled symptoms with your nurse.   Your doctor will most likely have prescribed pain medications.      If you are going home after surgery you will receive individualized written care instructions before being discharged.  A responsible adult must drive you to and from the hospital on the day of your surgery and stay with you for 24 hours.    If you are staying overnight following surgery, you will be transported to your hospital room following the recovery period.  [facility] has all private rooms.    If you have any questions please call Pre-Admission Testing at [phone].  Deductibles and co-payments are collected on the day of service. Please be prepared to pay the required co-pay, deductible or deposit on the day of service as defined by your plan.    Patient Education for Self-Quarantine Process    Following your COVID testing, we strongly recommend that you do not leave your home after you have been tested for COVID except to get medical care. This includes not going to work, school or to public areas.  If this is not possible for you to do please limit your activities to only required outings.  Be sure to wear a mask when you are with other people, practice social distancing and wash your hands frequently.      The following items provide additional details to keep you safe.  • Wash your hands with soap and water frequently for at least 20 seconds.   • Avoid touching your eyes, nose and mouth with unwashed hands.  • Do not share anything - utensils, towels, food from the same bowl.   • Have your own utensils,  drinking glass, dishes, towels and bedding.   • Do not have visitors.   • Do use FaceTime to stay in touch with family and friends.  • You should stay in a specific room away from others if possible.   • Stay at least 6 feet away from others in the home if you cannot have a dedicated room to yourself.   • Do not snuggle with your pet. While the CDC says there is no evidence that pets can spread COVID-19 or be infected from humans, it is probably best to avoid “petting, snuggling, being kissed or licked and sharing food (during self-quarantine)”, according to the CDC.   • Sanitize household surfaces daily. Include all high touch areas (door handles, light switches, phones, countertops, etc.)  • Do not share a bathroom with others, if possible.   • Wear a mask around others in your home if you are unable to stay in a separate room or 6 feet apart. If  you are unable to wear a mask, have your family member wear a mask if they must be within 6 feet of you.   Call your surgeon immediately if you experience any of the following symptoms:  • Sore Throat  • Shortness of Breath or difficulty breathing  • Cough  • Chills  • Body soreness or muscle pain  • Headache  • Fever  • New loss of taste or smell  • Do not arrive for your surgery ill.  Your procedure will need to be rescheduled to another time.  You will need to call your physician before the day of surgery to avoid any unnecessary exposure to hospital staff as well as other patients.    CHLORHEXIDINE CLOTH INSTRUCTIONS  The morning of surgery follow these instructions using the Chlorhexidine cloths you've been given.  These steps reduce bacteria on the body.  Do not use the cloths near your eyes, ears mouth, genitalia or on open wounds.  Throw the cloths away after use but do not try to flush them down a toilet.      • Open and remove one cloth at a time from the package.    • Leave the cloth unfolded and begin the bathing.  • Massage the skin with the cloths using  gentle pressure to remove bacteria.  Do not scrub harshly.   • Follow the steps below with one 2% CHG cloth per area (6 total cloths).  • One cloth for neck, shoulders and chest.  • One cloth for both arms, hands, fingers and underarms (do underarms last).  • One cloth for the abdomen followed by groin.  • One cloth for right leg and foot including between the toes.  • One cloth for left leg and foot including between the toes.  • The last cloth is to be used for the back of the neck, back and buttocks.    Allow the CHG to air dry 3 minutes on the skin which will give it time to work and decrease the chance of irritation.  The skin may feel sticky until it is dry.  Do not rinse with water or any other liquid or you will lose the beneficial effects of the CHG.  If mild skin irritation occurs, do rinse the skin to remove the CHG.  Report this to the nurse at time of admission.  Do not apply lotions, creams, ointments, deodorants or perfumes after using the clothes. Dress in clean clothes before coming to the hospital.

## 2020-10-24 ENCOUNTER — LAB (OUTPATIENT)
Dept: LAB | Facility: HOSPITAL | Age: 50
End: 2020-10-24

## 2020-10-24 DIAGNOSIS — Z01.818 OTHER SPECIFIED PRE-OPERATIVE EXAMINATION: ICD-10-CM

## 2020-10-24 PROCEDURE — U0004 COV-19 TEST NON-CDC HGH THRU: HCPCS | Performed by: INTERNAL MEDICINE

## 2020-10-24 PROCEDURE — C9803 HOPD COVID-19 SPEC COLLECT: HCPCS

## 2020-10-26 LAB — SARS-COV-2 RNA RESP QL NAA+PROBE: NOT DETECTED

## 2020-10-27 ENCOUNTER — ANESTHESIA (OUTPATIENT)
Dept: PERIOP | Facility: HOSPITAL | Age: 50
End: 2020-10-27

## 2020-10-27 ENCOUNTER — ANESTHESIA EVENT (OUTPATIENT)
Dept: PERIOP | Facility: HOSPITAL | Age: 50
End: 2020-10-27

## 2020-10-27 ENCOUNTER — HOSPITAL ENCOUNTER (INPATIENT)
Facility: HOSPITAL | Age: 50
LOS: 4 days | Discharge: HOME | End: 2020-10-31
Attending: SURGERY | Admitting: SURGERY

## 2020-10-27 DIAGNOSIS — K50.90 CROHN'S DISEASE (HCC): ICD-10-CM

## 2020-10-27 DIAGNOSIS — K50.012 CROHN'S DISEASE OF SMALL INTESTINE WITH INTESTINAL OBSTRUCTION (HCC): ICD-10-CM

## 2020-10-27 PROCEDURE — 25010000002 HYDROMORPHONE PER 4 MG: Performed by: NURSE ANESTHETIST, CERTIFIED REGISTERED

## 2020-10-27 PROCEDURE — 25010000003 BUPIVACAINE LIPOSOME 1.3 % SUSPENSION: Performed by: ANESTHESIOLOGY

## 2020-10-27 PROCEDURE — C9290 INJ, BUPIVACAINE LIPOSOME: HCPCS | Performed by: ANESTHESIOLOGY

## 2020-10-27 PROCEDURE — 25010000003 HYDROMORPHONE HCL PF 50 MG/5ML SOLUTION: Performed by: SURGERY

## 2020-10-27 PROCEDURE — C1765 ADHESION BARRIER: HCPCS | Performed by: SURGERY

## 2020-10-27 PROCEDURE — 25010000002 FENTANYL CITRATE (PF) 100 MCG/2ML SOLUTION: Performed by: ANESTHESIOLOGY

## 2020-10-27 PROCEDURE — 25010000002 ONDANSETRON PER 1 MG: Performed by: NURSE ANESTHETIST, CERTIFIED REGISTERED

## 2020-10-27 PROCEDURE — 25010000002 PROPOFOL 10 MG/ML EMULSION: Performed by: NURSE ANESTHETIST, CERTIFIED REGISTERED

## 2020-10-27 PROCEDURE — 0DBH0ZZ EXCISION OF CECUM, OPEN APPROACH: ICD-10-PCS | Performed by: SURGERY

## 2020-10-27 PROCEDURE — 44160 REMOVAL OF COLON: CPT | Performed by: SPECIALIST/TECHNOLOGIST, OTHER

## 2020-10-27 PROCEDURE — 88307 TISSUE EXAM BY PATHOLOGIST: CPT | Performed by: SURGERY

## 2020-10-27 PROCEDURE — 25010000002 NEOSTIGMINE PER 0.5 MG: Performed by: ANESTHESIOLOGY

## 2020-10-27 PROCEDURE — 25010000002 DEXAMETHASONE PER 1 MG: Performed by: NURSE ANESTHETIST, CERTIFIED REGISTERED

## 2020-10-27 PROCEDURE — 25010000002 FENTANYL CITRATE (PF) 100 MCG/2ML SOLUTION: Performed by: NURSE ANESTHETIST, CERTIFIED REGISTERED

## 2020-10-27 PROCEDURE — 25010000002 ONDANSETRON PER 1 MG: Performed by: ANESTHESIOLOGY

## 2020-10-27 PROCEDURE — 44160 REMOVAL OF COLON: CPT | Performed by: SURGERY

## 2020-10-27 PROCEDURE — 25010000003 CEFAZOLIN IN DEXTROSE 2-4 GM/100ML-% SOLUTION: Performed by: SURGERY

## 2020-10-27 PROCEDURE — 0DBB0ZZ EXCISION OF ILEUM, OPEN APPROACH: ICD-10-PCS | Performed by: SURGERY

## 2020-10-27 PROCEDURE — 0WQF0ZZ REPAIR ABDOMINAL WALL, OPEN APPROACH: ICD-10-PCS | Performed by: SURGERY

## 2020-10-27 PROCEDURE — 25010000003 CEFAZOLIN IN DEXTROSE 2-4 GM/100ML-% SOLUTION: Performed by: ANESTHESIOLOGY

## 2020-10-27 PROCEDURE — 25010000003 MEPIVACAINE PER 10 ML: Performed by: ANESTHESIOLOGY

## 2020-10-27 DEVICE — ENDOPATH ECHELON ENDOSCOPIC LINEAR CUTTER RELOADS, WHITE, 60MM
Type: IMPLANTABLE DEVICE | Site: ABDOMEN | Status: FUNCTIONAL
Brand: ECHELON ENDOPATH

## 2020-10-27 DEVICE — CLIP LIGAT VASC HORIZON TI LG ORNG 6CT: Type: IMPLANTABLE DEVICE | Site: ABDOMEN | Status: FUNCTIONAL

## 2020-10-27 RX ORDER — GLYCOPYRROLATE 0.2 MG/ML
INJECTION INTRAMUSCULAR; INTRAVENOUS AS NEEDED
Status: DISCONTINUED | OUTPATIENT
Start: 2020-10-27 | End: 2020-10-27 | Stop reason: SURG

## 2020-10-27 RX ORDER — BUPIVACAINE HYDROCHLORIDE 2.5 MG/ML
INJECTION, SOLUTION EPIDURAL; INFILTRATION; INTRACAUDAL
Status: COMPLETED | OUTPATIENT
Start: 2020-10-27 | End: 2020-10-27

## 2020-10-27 RX ORDER — LIDOCAINE HYDROCHLORIDE 40 MG/ML
SOLUTION TOPICAL AS NEEDED
Status: DISCONTINUED | OUTPATIENT
Start: 2020-10-27 | End: 2020-10-27 | Stop reason: SURG

## 2020-10-27 RX ORDER — FENTANYL CITRATE 50 UG/ML
50 INJECTION, SOLUTION INTRAMUSCULAR; INTRAVENOUS
Status: DISCONTINUED | OUTPATIENT
Start: 2020-10-27 | End: 2020-10-27 | Stop reason: HOSPADM

## 2020-10-27 RX ORDER — CEFAZOLIN SODIUM 2 G/100ML
2 INJECTION, SOLUTION INTRAVENOUS EVERY 8 HOURS
Status: COMPLETED | OUTPATIENT
Start: 2020-10-27 | End: 2020-10-28

## 2020-10-27 RX ORDER — FLUMAZENIL 0.1 MG/ML
0.2 INJECTION INTRAVENOUS AS NEEDED
Status: DISCONTINUED | OUTPATIENT
Start: 2020-10-27 | End: 2020-10-27 | Stop reason: HOSPADM

## 2020-10-27 RX ORDER — DEXTROSE, SODIUM CHLORIDE, AND POTASSIUM CHLORIDE 5; .45; .15 G/100ML; G/100ML; G/100ML
75 INJECTION INTRAVENOUS CONTINUOUS
Status: DISCONTINUED | OUTPATIENT
Start: 2020-10-27 | End: 2020-10-30

## 2020-10-27 RX ORDER — CEFAZOLIN SODIUM 2 G/100ML
2 INJECTION, SOLUTION INTRAVENOUS ONCE
Status: COMPLETED | OUTPATIENT
Start: 2020-10-27 | End: 2020-10-27

## 2020-10-27 RX ORDER — ONDANSETRON 2 MG/ML
INJECTION INTRAMUSCULAR; INTRAVENOUS AS NEEDED
Status: DISCONTINUED | OUTPATIENT
Start: 2020-10-27 | End: 2020-10-27 | Stop reason: SURG

## 2020-10-27 RX ORDER — DIPHENHYDRAMINE HCL 25 MG
25 CAPSULE ORAL
Status: DISCONTINUED | OUTPATIENT
Start: 2020-10-27 | End: 2020-10-27 | Stop reason: HOSPADM

## 2020-10-27 RX ORDER — DEXAMETHASONE SODIUM PHOSPHATE 10 MG/ML
INJECTION INTRAMUSCULAR; INTRAVENOUS AS NEEDED
Status: DISCONTINUED | OUTPATIENT
Start: 2020-10-27 | End: 2020-10-27 | Stop reason: SURG

## 2020-10-27 RX ORDER — ONDANSETRON 2 MG/ML
4 INJECTION INTRAMUSCULAR; INTRAVENOUS EVERY 6 HOURS PRN
Status: DISCONTINUED | OUTPATIENT
Start: 2020-10-27 | End: 2020-10-31 | Stop reason: HOSPADM

## 2020-10-27 RX ORDER — PROMETHAZINE HYDROCHLORIDE 25 MG/1
25 TABLET ORAL ONCE AS NEEDED
Status: COMPLETED | OUTPATIENT
Start: 2020-10-27 | End: 2020-10-27

## 2020-10-27 RX ORDER — ONDANSETRON 2 MG/ML
4 INJECTION INTRAMUSCULAR; INTRAVENOUS ONCE AS NEEDED
Status: COMPLETED | OUTPATIENT
Start: 2020-10-27 | End: 2020-10-27

## 2020-10-27 RX ORDER — HYDROMORPHONE HYDROCHLORIDE 1 MG/ML
0.5 INJECTION, SOLUTION INTRAMUSCULAR; INTRAVENOUS; SUBCUTANEOUS
Status: DISCONTINUED | OUTPATIENT
Start: 2020-10-27 | End: 2020-10-27 | Stop reason: HOSPADM

## 2020-10-27 RX ORDER — ROCURONIUM BROMIDE 10 MG/ML
INJECTION, SOLUTION INTRAVENOUS AS NEEDED
Status: DISCONTINUED | OUTPATIENT
Start: 2020-10-27 | End: 2020-10-27 | Stop reason: SURG

## 2020-10-27 RX ORDER — PANTOPRAZOLE SODIUM 40 MG/1
40 TABLET, DELAYED RELEASE ORAL EVERY MORNING
Status: DISCONTINUED | OUTPATIENT
Start: 2020-10-28 | End: 2020-10-31 | Stop reason: HOSPADM

## 2020-10-27 RX ORDER — ALVIMOPAN 12 MG/1
12 CAPSULE ORAL 2 TIMES DAILY
Status: DISCONTINUED | OUTPATIENT
Start: 2020-10-27 | End: 2020-10-30

## 2020-10-27 RX ORDER — NALOXONE HCL 0.4 MG/ML
0.2 VIAL (ML) INJECTION AS NEEDED
Status: DISCONTINUED | OUTPATIENT
Start: 2020-10-27 | End: 2020-10-27 | Stop reason: HOSPADM

## 2020-10-27 RX ORDER — EPHEDRINE SULFATE 50 MG/ML
5 INJECTION, SOLUTION INTRAVENOUS ONCE AS NEEDED
Status: DISCONTINUED | OUTPATIENT
Start: 2020-10-27 | End: 2020-10-27 | Stop reason: HOSPADM

## 2020-10-27 RX ORDER — HYDROCODONE BITARTRATE AND ACETAMINOPHEN 7.5; 325 MG/1; MG/1
1 TABLET ORAL ONCE AS NEEDED
Status: DISCONTINUED | OUTPATIENT
Start: 2020-10-27 | End: 2020-10-27 | Stop reason: HOSPADM

## 2020-10-27 RX ORDER — HYDROMORPHONE HCL 110MG/55ML
PATIENT CONTROLLED ANALGESIA SYRINGE INTRAVENOUS AS NEEDED
Status: DISCONTINUED | OUTPATIENT
Start: 2020-10-27 | End: 2020-10-27 | Stop reason: SURG

## 2020-10-27 RX ORDER — MIDAZOLAM HYDROCHLORIDE 1 MG/ML
1 INJECTION INTRAMUSCULAR; INTRAVENOUS
Status: DISCONTINUED | OUTPATIENT
Start: 2020-10-27 | End: 2020-10-27 | Stop reason: HOSPADM

## 2020-10-27 RX ORDER — LIDOCAINE HYDROCHLORIDE 20 MG/ML
INJECTION, SOLUTION INFILTRATION; PERINEURAL AS NEEDED
Status: DISCONTINUED | OUTPATIENT
Start: 2020-10-27 | End: 2020-10-27 | Stop reason: SURG

## 2020-10-27 RX ORDER — MEPERIDINE HYDROCHLORIDE 25 MG/ML
12.5 INJECTION INTRAMUSCULAR; INTRAVENOUS; SUBCUTANEOUS
Status: DISCONTINUED | OUTPATIENT
Start: 2020-10-27 | End: 2020-10-27 | Stop reason: HOSPADM

## 2020-10-27 RX ORDER — LABETALOL HYDROCHLORIDE 5 MG/ML
5 INJECTION, SOLUTION INTRAVENOUS
Status: DISCONTINUED | OUTPATIENT
Start: 2020-10-27 | End: 2020-10-27 | Stop reason: HOSPADM

## 2020-10-27 RX ORDER — DIPHENHYDRAMINE HYDROCHLORIDE 50 MG/ML
25 INJECTION INTRAMUSCULAR; INTRAVENOUS EVERY 6 HOURS PRN
Status: DISCONTINUED | OUTPATIENT
Start: 2020-10-27 | End: 2020-10-31 | Stop reason: HOSPADM

## 2020-10-27 RX ORDER — CEFAZOLIN SODIUM 2 G/100ML
INJECTION, SOLUTION INTRAVENOUS AS NEEDED
Status: DISCONTINUED | OUTPATIENT
Start: 2020-10-27 | End: 2020-10-27 | Stop reason: SURG

## 2020-10-27 RX ORDER — SODIUM CHLORIDE 0.9 % (FLUSH) 0.9 %
3 SYRINGE (ML) INJECTION EVERY 12 HOURS SCHEDULED
Status: DISCONTINUED | OUTPATIENT
Start: 2020-10-27 | End: 2020-10-27 | Stop reason: HOSPADM

## 2020-10-27 RX ORDER — DIPHENHYDRAMINE HYDROCHLORIDE 50 MG/ML
12.5 INJECTION INTRAMUSCULAR; INTRAVENOUS
Status: DISCONTINUED | OUTPATIENT
Start: 2020-10-27 | End: 2020-10-27 | Stop reason: HOSPADM

## 2020-10-27 RX ORDER — SODIUM CHLORIDE 0.9 % (FLUSH) 0.9 %
3-10 SYRINGE (ML) INJECTION AS NEEDED
Status: DISCONTINUED | OUTPATIENT
Start: 2020-10-27 | End: 2020-10-27 | Stop reason: HOSPADM

## 2020-10-27 RX ORDER — LIDOCAINE HYDROCHLORIDE 10 MG/ML
0.5 INJECTION, SOLUTION EPIDURAL; INFILTRATION; INTRACAUDAL; PERINEURAL ONCE AS NEEDED
Status: DISCONTINUED | OUTPATIENT
Start: 2020-10-27 | End: 2020-10-27 | Stop reason: HOSPADM

## 2020-10-27 RX ORDER — SODIUM CHLORIDE, SODIUM LACTATE, POTASSIUM CHLORIDE, CALCIUM CHLORIDE 600; 310; 30; 20 MG/100ML; MG/100ML; MG/100ML; MG/100ML
9 INJECTION, SOLUTION INTRAVENOUS CONTINUOUS
Status: DISCONTINUED | OUTPATIENT
Start: 2020-10-27 | End: 2020-10-28

## 2020-10-27 RX ORDER — HYDROMORPHONE HCL IN 0.9% NACL 10 MG/50ML
PATIENT CONTROLLED ANALGESIA SYRINGE INTRAVENOUS CONTINUOUS
Status: DISCONTINUED | OUTPATIENT
Start: 2020-10-27 | End: 2020-10-28

## 2020-10-27 RX ORDER — SODIUM CHLORIDE 9 MG/ML
INJECTION, SOLUTION INTRAVENOUS AS NEEDED
Status: DISCONTINUED | OUTPATIENT
Start: 2020-10-27 | End: 2020-10-27 | Stop reason: HOSPADM

## 2020-10-27 RX ORDER — NALOXONE HCL 0.4 MG/ML
0.1 VIAL (ML) INJECTION
Status: DISCONTINUED | OUTPATIENT
Start: 2020-10-27 | End: 2020-10-31 | Stop reason: HOSPADM

## 2020-10-27 RX ORDER — OXYCODONE AND ACETAMINOPHEN 7.5; 325 MG/1; MG/1
1 TABLET ORAL ONCE AS NEEDED
Status: DISCONTINUED | OUTPATIENT
Start: 2020-10-27 | End: 2020-10-27 | Stop reason: HOSPADM

## 2020-10-27 RX ORDER — SODIUM CHLORIDE, SODIUM LACTATE, POTASSIUM CHLORIDE, CALCIUM CHLORIDE 600; 310; 30; 20 MG/100ML; MG/100ML; MG/100ML; MG/100ML
INJECTION, SOLUTION INTRAVENOUS CONTINUOUS PRN
Status: DISCONTINUED | OUTPATIENT
Start: 2020-10-27 | End: 2020-10-27 | Stop reason: SURG

## 2020-10-27 RX ORDER — PROMETHAZINE HYDROCHLORIDE 25 MG/1
25 SUPPOSITORY RECTAL ONCE AS NEEDED
Status: COMPLETED | OUTPATIENT
Start: 2020-10-27 | End: 2020-10-27

## 2020-10-27 RX ORDER — ACETAMINOPHEN 650 MG
TABLET, EXTENDED RELEASE ORAL AS NEEDED
Status: DISCONTINUED | OUTPATIENT
Start: 2020-10-27 | End: 2020-10-27 | Stop reason: HOSPADM

## 2020-10-27 RX ORDER — FAMOTIDINE 10 MG/ML
20 INJECTION, SOLUTION INTRAVENOUS ONCE
Status: COMPLETED | OUTPATIENT
Start: 2020-10-27 | End: 2020-10-27

## 2020-10-27 RX ORDER — PROPOFOL 10 MG/ML
VIAL (ML) INTRAVENOUS AS NEEDED
Status: DISCONTINUED | OUTPATIENT
Start: 2020-10-27 | End: 2020-10-27 | Stop reason: SURG

## 2020-10-27 RX ADMIN — HYDROMORPHONE HYDROCHLORIDE 0.5 MG: 1 INJECTION, SOLUTION INTRAMUSCULAR; INTRAVENOUS; SUBCUTANEOUS at 19:26

## 2020-10-27 RX ADMIN — PROPOFOL 150 MG: 10 INJECTION, EMULSION INTRAVENOUS at 13:27

## 2020-10-27 RX ADMIN — HYDROMORPHONE HYDROCHLORIDE 0.5 MG: 2 INJECTION, SOLUTION INTRAMUSCULAR; INTRAVENOUS; SUBCUTANEOUS at 14:17

## 2020-10-27 RX ADMIN — FENTANYL CITRATE 50 MCG: 50 INJECTION, SOLUTION INTRAMUSCULAR; INTRAVENOUS at 17:39

## 2020-10-27 RX ADMIN — BUPIVACAINE 20 ML: 13.3 INJECTION, SUSPENSION, LIPOSOMAL INFILTRATION at 13:28

## 2020-10-27 RX ADMIN — ROCURONIUM BROMIDE 50 MG: 10 INJECTION INTRAVENOUS at 13:27

## 2020-10-27 RX ADMIN — FENTANYL CITRATE 50 MCG: 50 INJECTION INTRAMUSCULAR; INTRAVENOUS at 14:03

## 2020-10-27 RX ADMIN — METRONIDAZOLE 500 MG: 500 INJECTION, SOLUTION INTRAVENOUS at 15:58

## 2020-10-27 RX ADMIN — FENTANYL CITRATE 50 MCG: 50 INJECTION INTRAMUSCULAR; INTRAVENOUS at 15:20

## 2020-10-27 RX ADMIN — ONDANSETRON 4 MG: 2 INJECTION INTRAMUSCULAR; INTRAVENOUS at 17:30

## 2020-10-27 RX ADMIN — FENTANYL CITRATE 50 MCG: 50 INJECTION, SOLUTION INTRAMUSCULAR; INTRAVENOUS at 19:02

## 2020-10-27 RX ADMIN — CEFAZOLIN SODIUM 2 G: 2 INJECTION, SOLUTION INTRAVENOUS at 21:55

## 2020-10-27 RX ADMIN — MEPIVACAINE HYDROCHLORIDE 10 ML: 15 INJECTION, SOLUTION EPIDURAL; INFILTRATION at 13:28

## 2020-10-27 RX ADMIN — CEFAZOLIN SODIUM 2 G: 2 INJECTION, SOLUTION INTRAVENOUS at 12:43

## 2020-10-27 RX ADMIN — METRONIDAZOLE 500 MG: 500 INJECTION, SOLUTION INTRAVENOUS at 22:21

## 2020-10-27 RX ADMIN — FENTANYL CITRATE 50 MCG: 50 INJECTION INTRAMUSCULAR; INTRAVENOUS at 13:27

## 2020-10-27 RX ADMIN — ALVIMOPAN 12 MG: 12 CAPSULE ORAL at 12:42

## 2020-10-27 RX ADMIN — FENTANYL CITRATE 50 MCG: 50 INJECTION INTRAMUSCULAR; INTRAVENOUS at 17:09

## 2020-10-27 RX ADMIN — BUPIVACAINE HYDROCHLORIDE 20 ML: 2.5 INJECTION, SOLUTION EPIDURAL; INFILTRATION; INTRACAUDAL; PERINEURAL at 13:28

## 2020-10-27 RX ADMIN — ONDANSETRON HYDROCHLORIDE 4 MG: 2 SOLUTION INTRAMUSCULAR; INTRAVENOUS at 16:40

## 2020-10-27 RX ADMIN — GLYCOPYRROLATE 0.3 MG: 0.2 INJECTION INTRAMUSCULAR; INTRAVENOUS at 16:42

## 2020-10-27 RX ADMIN — ROCURONIUM BROMIDE 10 MG: 10 INJECTION INTRAVENOUS at 16:01

## 2020-10-27 RX ADMIN — HYDROMORPHONE HYDROCHLORIDE 0.5 MG: 2 INJECTION, SOLUTION INTRAMUSCULAR; INTRAVENOUS; SUBCUTANEOUS at 14:09

## 2020-10-27 RX ADMIN — METRONIDAZOLE 500 MG: 500 INJECTION, SOLUTION INTRAVENOUS at 12:39

## 2020-10-27 RX ADMIN — HYDROMORPHONE HYDROCHLORIDE 0.5 MG: 2 INJECTION, SOLUTION INTRAMUSCULAR; INTRAVENOUS; SUBCUTANEOUS at 14:27

## 2020-10-27 RX ADMIN — NEOSTIGMINE METHYLSULFATE 2 MG: 1 INJECTION INTRAMUSCULAR; INTRAVENOUS; SUBCUTANEOUS at 16:42

## 2020-10-27 RX ADMIN — LIDOCAINE HYDROCHLORIDE 1 EACH: 40 SOLUTION TOPICAL at 13:28

## 2020-10-27 RX ADMIN — HYDROMORPHONE HYDROCHLORIDE: 10 INJECTION, SOLUTION INTRAMUSCULAR; INTRAVENOUS; SUBCUTANEOUS at 18:36

## 2020-10-27 RX ADMIN — HYDROMORPHONE HYDROCHLORIDE 0.5 MG: 1 INJECTION, SOLUTION INTRAMUSCULAR; INTRAVENOUS; SUBCUTANEOUS at 18:28

## 2020-10-27 RX ADMIN — SODIUM CHLORIDE, POTASSIUM CHLORIDE, SODIUM LACTATE AND CALCIUM CHLORIDE: 600; 310; 30; 20 INJECTION, SOLUTION INTRAVENOUS at 11:46

## 2020-10-27 RX ADMIN — POTASSIUM CHLORIDE, DEXTROSE MONOHYDRATE AND SODIUM CHLORIDE 125 ML/HR: 150; 5; 450 INJECTION, SOLUTION INTRAVENOUS at 20:14

## 2020-10-27 RX ADMIN — FAMOTIDINE 20 MG: 10 INJECTION INTRAVENOUS at 12:42

## 2020-10-27 RX ADMIN — PROMETHAZINE HYDROCHLORIDE 25 MG: 25 SUPPOSITORY RECTAL at 18:28

## 2020-10-27 RX ADMIN — SODIUM CHLORIDE, POTASSIUM CHLORIDE, SODIUM LACTATE AND CALCIUM CHLORIDE: 600; 310; 30; 20 INJECTION, SOLUTION INTRAVENOUS at 16:22

## 2020-10-27 RX ADMIN — LIDOCAINE HYDROCHLORIDE 80 MG: 20 INJECTION, SOLUTION INFILTRATION; PERINEURAL at 13:27

## 2020-10-27 RX ADMIN — ALVIMOPAN 12 MG: 12 CAPSULE ORAL at 20:56

## 2020-10-27 RX ADMIN — SODIUM CHLORIDE, POTASSIUM CHLORIDE, SODIUM LACTATE AND CALCIUM CHLORIDE: 600; 310; 30; 20 INJECTION, SOLUTION INTRAVENOUS at 14:22

## 2020-10-27 RX ADMIN — ROCURONIUM BROMIDE 20 MG: 10 INJECTION INTRAVENOUS at 15:11

## 2020-10-27 RX ADMIN — CEFAZOLIN SODIUM 2 G: 2 INJECTION, SOLUTION INTRAVENOUS at 15:55

## 2020-10-27 RX ADMIN — ROCURONIUM BROMIDE 50 MG: 10 INJECTION INTRAVENOUS at 14:08

## 2020-10-27 RX ADMIN — SODIUM CHLORIDE, POTASSIUM CHLORIDE, SODIUM LACTATE AND CALCIUM CHLORIDE 9 ML/HR: 600; 310; 30; 20 INJECTION, SOLUTION INTRAVENOUS at 12:39

## 2020-10-27 RX ADMIN — DEXAMETHASONE SODIUM PHOSPHATE 8 MG: 10 INJECTION INTRAMUSCULAR; INTRAVENOUS at 13:31

## 2020-10-27 NOTE — ANESTHESIA PROCEDURE NOTES
Airway  Urgency: elective    Date/Time: 10/27/2020 1:28 PM  Airway not difficult    General Information and Staff    Patient location during procedure: OR  Anesthesiologist: Lino Sandra MD  CRNA: Jayden Willson CRNA    Indications and Patient Condition  Indications for airway management: airway protection    Preoxygenated: yes  MILS not maintained throughout  Mask difficulty assessment: 1 - vent by mask    Final Airway Details  Final airway type: endotracheal airway      Successful airway: ETT  Cuffed: yes   Successful intubation technique: direct laryngoscopy  Endotracheal tube insertion site: oral  Blade: Shaun  Blade size: 3  ETT size (mm): 7.5  Cormack-Lehane Classification: grade I - full view of glottis  Placement verified by: chest auscultation   Cuff volume (mL): 7  Measured from: lips  ETT/EBT  to lips (cm): 22  Number of attempts at approach: 1  Assessment: lips, teeth, and gum same as pre-op and atraumatic intubation    Additional Comments  Pre O2, SIAI

## 2020-10-27 NOTE — ANESTHESIA PROCEDURE NOTES
Peripheral Block    Pre-sedation assessment completed: 10/27/2020 1:28 PM    Patient reassessed immediately prior to procedure    Patient location during procedure: OR  Start time: 10/27/2020 1:28 PM  Stop time: 10/27/2020 1:38 PM  Reason for block: at surgeon's request and post-op pain management  Performed by  Anesthesiologist: Lino Sandra MD  Preanesthetic Checklist  Completed: patient identified, site marked, surgical consent, pre-op evaluation, timeout performed, IV checked, risks and benefits discussed and monitors and equipment checked  Prep:  Sterile barriers:cap, gloves, gown, mask and sterile barriers  Prep: ChloraPrep  Patient monitoring: blood pressure monitoring, continuous pulse oximetry and EKG  Procedure  Sedation:yes    Guidance:ultrasound guided  ULTRASOUND INTERPRETATION. Using ultrasound guidance a 21 G gauge needle was placed in close proximity to the nerve, at which point, under ultrasound guidance anesthetic was injected in the area of the nerve and spread of the anesthesia was seen on ultrasound in close proximity thereto.  There were no abnormalities seen on ultrasound; a digital image was taken; and the patient tolerated the procedure with no complications. Images:still images obtained    Laterality:Bilateral  Block Type:TAP  Injection Technique:single-shot  Needle Type:echogenic  Needle Gauge:21 G      Medications Used: bupivacaine liposome (EXPAREL) 1.3 % injection, 20 mL  bupivacaine PF (MARCAINE) 0.25 % injection, 20 mL  mepivacaine (CARBOCAINE) 1.5 % injection, 10 mL      Post Assessment  Injection Assessment: negative aspiration for heme, no paresthesia on injection and incremental injection  Patient Tolerance:comfortable throughout block  Complications:no

## 2020-10-27 NOTE — ANESTHESIA PREPROCEDURE EVALUATION
" Anesthesia Evaluation     Patient summary reviewed   no history of anesthetic complications:  NPO Solid Status: > 8 hours  NPO Liquid Status: > 2 hours           Airway   Mallampati: I  TM distance: >3 FB  Neck ROM: full  Dental      Pulmonary     breath sounds clear to auscultation  (+) a smoker (Quit in 1999) Former, asthma (Rarely uses rescue. No daily med),sleep apnea on CPAP,   (-) shortness of breath  Cardiovascular   Exercise tolerance: good (4-7 METS)    ECG reviewed  Rhythm: regular  Rate: normal    (-) angina, GARCÍA    ROS comment: 10/22/20 EKG: NSR    Neuro/Psych  GI/Hepatic/Renal/Endo    (+)  GERD well controlled,  liver disease (Claims \"posible MACDONALD but normal or slightly elevated labs for last few years\"),     Musculoskeletal     Abdominal    Substance History      OB/GYN          Other                        Anesthesia Plan    ASA 3     general     intravenous induction     Anesthetic plan, all risks, benefits, and alternatives have been provided, discussed and informed consent has been obtained with: patient.      "

## 2020-10-27 NOTE — ANESTHESIA POSTPROCEDURE EVALUATION
"Patient: Margarito Miles    Procedure Summary     Date: 10/27/20 Room / Location: [facility] OR  / [facility] MAIN OR    Anesthesia Start: 1316 Anesthesia Stop: 1710    Procedure: OPEN ILEOCOLIC RESECTION AND OPEN INCISIONAL HERNIA  REPAIR (N/A Abdomen) Diagnosis:       Crohn's disease of small intestine with intestinal obstruction (CMS/HCC)      (Crohn's disease of small intestine with intestinal obstruction (CMS/HCC) [K50.012])    Surgeon: Troy Guardado MD Provider: Darian Baumann MD    Anesthesia Type: general ASA Status: 3          Anesthesia Type: general    Vitals  Vitals Value Taken Time   /101 10/27/20 1856   Temp 37.4 °C (99.3 °F) 10/27/20 1825   Pulse 104 10/27/20 1900   Resp 16 10/27/20 1845   SpO2 99 % 10/27/20 1900   Vitals shown include unvalidated device data.        Post Anesthesia Care and Evaluation    Patient location during evaluation: bedside  Patient participation: complete - patient participated  Level of consciousness: awake and alert  Pain management: adequate  Airway patency: patent  Anesthetic complications: No anesthetic complications    Cardiovascular status: acceptable  Respiratory status: acceptable  Hydration status: acceptable    Comments: /100   Pulse 104   Temp 37.4 °C (99.3 °F) (Oral)   Resp 16   Ht 182.9 cm (72\")   Wt 101 kg (222 lb)   SpO2 99%   BMI 30.11 kg/m²       "

## 2020-10-28 LAB
ANION GAP SERPL CALCULATED.3IONS-SCNC: 10 MMOL/L (ref 5–15)
BUN SERPL-MCNC: 10 MG/DL (ref 6–20)
BUN/CREAT SERPL: 8.1 (ref 7–25)
CALCIUM SPEC-SCNC: 8.5 MG/DL (ref 8.6–10.5)
CHLORIDE SERPL-SCNC: 101 MMOL/L (ref 98–107)
CO2 SERPL-SCNC: 27 MMOL/L (ref 22–29)
CREAT SERPL-MCNC: 1.24 MG/DL (ref 0.76–1.27)
DEPRECATED RDW RBC AUTO: 40.8 FL (ref 37–54)
ERYTHROCYTE [DISTWIDTH] IN BLOOD BY AUTOMATED COUNT: 13.1 % (ref 12.3–15.4)
GFR SERPL CREATININE-BSD FRML MDRD: 62 ML/MIN/1.73
GLUCOSE SERPL-MCNC: 132 MG/DL (ref 65–99)
HCT VFR BLD AUTO: 39.7 % (ref 37.5–51)
HGB BLD-MCNC: 14.1 G/DL (ref 13–17.7)
Lab: NORMAL
MCH RBC QN AUTO: 30.5 PG (ref 26.6–33)
MCHC RBC AUTO-ENTMCNC: 35.5 G/DL (ref 31.5–35.7)
MCV RBC AUTO: 85.7 FL (ref 79–97)
METHYLMALONATE SERPL-SCNC: 110 NMOL/L (ref 0–378)
PLATELET # BLD AUTO: 204 10*3/MM3 (ref 140–450)
PMV BLD AUTO: 10.3 FL (ref 6–12)
POTASSIUM SERPL-SCNC: 4.3 MMOL/L (ref 3.5–5.2)
RBC # BLD AUTO: 4.63 10*6/MM3 (ref 4.14–5.8)
SODIUM SERPL-SCNC: 138 MMOL/L (ref 136–145)
WBC # BLD AUTO: 11.43 10*3/MM3 (ref 3.4–10.8)

## 2020-10-28 PROCEDURE — 25010000002 KETOROLAC TROMETHAMINE PER 15 MG: Performed by: SURGERY

## 2020-10-28 PROCEDURE — 25010000003 CEFAZOLIN IN DEXTROSE 2-4 GM/100ML-% SOLUTION: Performed by: SURGERY

## 2020-10-28 PROCEDURE — 25010000002 ENOXAPARIN PER 10 MG: Performed by: SURGERY

## 2020-10-28 PROCEDURE — 85027 COMPLETE CBC AUTOMATED: CPT | Performed by: SURGERY

## 2020-10-28 PROCEDURE — 25010000002 ONDANSETRON PER 1 MG: Performed by: SURGERY

## 2020-10-28 PROCEDURE — 94799 UNLISTED PULMONARY SVC/PX: CPT

## 2020-10-28 PROCEDURE — 63710000001 PROMETHAZINE PER 25 MG: Performed by: SURGERY

## 2020-10-28 PROCEDURE — 80048 BASIC METABOLIC PNL TOTAL CA: CPT | Performed by: SURGERY

## 2020-10-28 PROCEDURE — 99024 POSTOP FOLLOW-UP VISIT: CPT | Performed by: SURGERY

## 2020-10-28 RX ORDER — KETOROLAC TROMETHAMINE 30 MG/ML
15 INJECTION, SOLUTION INTRAMUSCULAR; INTRAVENOUS EVERY 6 HOURS
Status: COMPLETED | OUTPATIENT
Start: 2020-10-28 | End: 2020-10-30

## 2020-10-28 RX ORDER — HYDROMORPHONE HCL IN 0.9% NACL 10 MG/50ML
PATIENT CONTROLLED ANALGESIA SYRINGE INTRAVENOUS CONTINUOUS
Status: DISCONTINUED | OUTPATIENT
Start: 2020-10-28 | End: 2020-10-30

## 2020-10-28 RX ORDER — PROMETHAZINE HYDROCHLORIDE 25 MG/1
25 TABLET ORAL EVERY 6 HOURS PRN
Status: DISCONTINUED | OUTPATIENT
Start: 2020-10-28 | End: 2020-10-31 | Stop reason: HOSPADM

## 2020-10-28 RX ADMIN — PROMETHAZINE HYDROCHLORIDE 25 MG: 25 TABLET ORAL at 18:59

## 2020-10-28 RX ADMIN — ENOXAPARIN SODIUM 40 MG: 40 INJECTION SUBCUTANEOUS at 09:26

## 2020-10-28 RX ADMIN — ALVIMOPAN 12 MG: 12 CAPSULE ORAL at 09:26

## 2020-10-28 RX ADMIN — POTASSIUM CHLORIDE, DEXTROSE MONOHYDRATE AND SODIUM CHLORIDE 125 ML/HR: 150; 5; 450 INJECTION, SOLUTION INTRAVENOUS at 04:03

## 2020-10-28 RX ADMIN — CEFAZOLIN SODIUM 2 G: 2 INJECTION, SOLUTION INTRAVENOUS at 04:00

## 2020-10-28 RX ADMIN — KETOROLAC TROMETHAMINE 15 MG: 30 INJECTION, SOLUTION INTRAMUSCULAR at 18:59

## 2020-10-28 RX ADMIN — ONDANSETRON HYDROCHLORIDE 4 MG: 2 SOLUTION INTRAMUSCULAR; INTRAVENOUS at 10:58

## 2020-10-28 RX ADMIN — KETOROLAC TROMETHAMINE 15 MG: 30 INJECTION, SOLUTION INTRAMUSCULAR at 12:58

## 2020-10-28 RX ADMIN — PANTOPRAZOLE SODIUM 40 MG: 40 TABLET, DELAYED RELEASE ORAL at 06:24

## 2020-10-28 RX ADMIN — POTASSIUM CHLORIDE, DEXTROSE MONOHYDRATE AND SODIUM CHLORIDE 100 ML/HR: 150; 5; 450 INJECTION, SOLUTION INTRAVENOUS at 20:41

## 2020-10-28 RX ADMIN — PROMETHAZINE HYDROCHLORIDE 25 MG: 25 TABLET ORAL at 12:56

## 2020-10-28 RX ADMIN — ALVIMOPAN 12 MG: 12 CAPSULE ORAL at 20:40

## 2020-10-28 RX ADMIN — METRONIDAZOLE 500 MG: 500 INJECTION, SOLUTION INTRAVENOUS at 06:23

## 2020-10-29 LAB
CYTO UR: NORMAL
LAB AP CASE REPORT: NORMAL
PATH REPORT.FINAL DX SPEC: NORMAL
PATH REPORT.GROSS SPEC: NORMAL

## 2020-10-29 PROCEDURE — 25010000002 KETOROLAC TROMETHAMINE PER 15 MG: Performed by: SURGERY

## 2020-10-29 PROCEDURE — 99024 POSTOP FOLLOW-UP VISIT: CPT | Performed by: SURGERY

## 2020-10-29 PROCEDURE — 25010000002 ENOXAPARIN PER 10 MG: Performed by: SURGERY

## 2020-10-29 RX ADMIN — KETOROLAC TROMETHAMINE 15 MG: 30 INJECTION, SOLUTION INTRAMUSCULAR at 02:46

## 2020-10-29 RX ADMIN — KETOROLAC TROMETHAMINE 15 MG: 30 INJECTION, SOLUTION INTRAMUSCULAR at 18:35

## 2020-10-29 RX ADMIN — POTASSIUM CHLORIDE, DEXTROSE MONOHYDRATE AND SODIUM CHLORIDE 100 ML/HR: 150; 5; 450 INJECTION, SOLUTION INTRAVENOUS at 16:02

## 2020-10-29 RX ADMIN — KETOROLAC TROMETHAMINE 15 MG: 30 INJECTION, SOLUTION INTRAMUSCULAR at 08:42

## 2020-10-29 RX ADMIN — ALVIMOPAN 12 MG: 12 CAPSULE ORAL at 10:05

## 2020-10-29 RX ADMIN — ALVIMOPAN 12 MG: 12 CAPSULE ORAL at 20:26

## 2020-10-29 RX ADMIN — ENOXAPARIN SODIUM 40 MG: 40 INJECTION SUBCUTANEOUS at 08:42

## 2020-10-29 RX ADMIN — PANTOPRAZOLE SODIUM 40 MG: 40 TABLET, DELAYED RELEASE ORAL at 06:22

## 2020-10-29 RX ADMIN — KETOROLAC TROMETHAMINE 15 MG: 30 INJECTION, SOLUTION INTRAMUSCULAR at 13:20

## 2020-10-29 RX ADMIN — POTASSIUM CHLORIDE, DEXTROSE MONOHYDRATE AND SODIUM CHLORIDE 100 ML/HR: 150; 5; 450 INJECTION, SOLUTION INTRAVENOUS at 06:22

## 2020-10-30 PROCEDURE — 99024 POSTOP FOLLOW-UP VISIT: CPT | Performed by: SURGERY

## 2020-10-30 PROCEDURE — 25010000002 ENOXAPARIN PER 10 MG: Performed by: SURGERY

## 2020-10-30 PROCEDURE — 63710000001 PROMETHAZINE PER 25 MG: Performed by: SURGERY

## 2020-10-30 PROCEDURE — 25010000002 KETOROLAC TROMETHAMINE PER 15 MG: Performed by: SURGERY

## 2020-10-30 RX ORDER — HYDROMORPHONE HYDROCHLORIDE 1 MG/ML
0.5 INJECTION, SOLUTION INTRAMUSCULAR; INTRAVENOUS; SUBCUTANEOUS
Status: DISCONTINUED | OUTPATIENT
Start: 2020-10-30 | End: 2020-10-31 | Stop reason: HOSPADM

## 2020-10-30 RX ORDER — HYDROCODONE BITARTRATE AND ACETAMINOPHEN 5; 325 MG/1; MG/1
1 TABLET ORAL EVERY 4 HOURS PRN
Status: DISCONTINUED | OUTPATIENT
Start: 2020-10-30 | End: 2020-10-31 | Stop reason: HOSPADM

## 2020-10-30 RX ORDER — HYDROCODONE BITARTRATE AND ACETAMINOPHEN 5; 325 MG/1; MG/1
2 TABLET ORAL EVERY 4 HOURS PRN
Status: DISCONTINUED | OUTPATIENT
Start: 2020-10-30 | End: 2020-10-31 | Stop reason: HOSPADM

## 2020-10-30 RX ADMIN — KETOROLAC TROMETHAMINE 15 MG: 30 INJECTION, SOLUTION INTRAMUSCULAR at 01:50

## 2020-10-30 RX ADMIN — ENOXAPARIN SODIUM 40 MG: 40 INJECTION SUBCUTANEOUS at 08:41

## 2020-10-30 RX ADMIN — PROMETHAZINE HYDROCHLORIDE 25 MG: 25 TABLET ORAL at 01:55

## 2020-10-30 RX ADMIN — PROMETHAZINE HYDROCHLORIDE 25 MG: 25 TABLET ORAL at 20:37

## 2020-10-30 RX ADMIN — POTASSIUM CHLORIDE, DEXTROSE MONOHYDRATE AND SODIUM CHLORIDE 100 ML/HR: 150; 5; 450 INJECTION, SOLUTION INTRAVENOUS at 01:55

## 2020-10-30 RX ADMIN — HYDROCODONE BITARTRATE AND ACETAMINOPHEN 1 TABLET: 5; 325 TABLET ORAL at 20:37

## 2020-10-30 RX ADMIN — KETOROLAC TROMETHAMINE 15 MG: 30 INJECTION, SOLUTION INTRAMUSCULAR at 06:30

## 2020-10-30 RX ADMIN — PANTOPRAZOLE SODIUM 40 MG: 40 TABLET, DELAYED RELEASE ORAL at 06:25

## 2020-10-31 ENCOUNTER — READMISSION MANAGEMENT (OUTPATIENT)
Dept: CALL CENTER | Facility: HOSPITAL | Age: 50
End: 2020-10-31

## 2020-10-31 VITALS
RESPIRATION RATE: 18 BRPM | HEART RATE: 94 BPM | TEMPERATURE: 98.4 F | SYSTOLIC BLOOD PRESSURE: 133 MMHG | HEIGHT: 75 IN | OXYGEN SATURATION: 97 % | BODY MASS INDEX: 27.18 KG/M2 | WEIGHT: 218.6 LBS | DIASTOLIC BLOOD PRESSURE: 84 MMHG

## 2020-10-31 PROCEDURE — 25010000002 ENOXAPARIN PER 10 MG: Performed by: SURGERY

## 2020-10-31 PROCEDURE — 99024 POSTOP FOLLOW-UP VISIT: CPT | Performed by: SURGERY

## 2020-10-31 RX ORDER — HYDROCODONE BITARTRATE AND ACETAMINOPHEN 5; 325 MG/1; MG/1
TABLET ORAL
Qty: 20 TABLET | Refills: 0 | Status: SHIPPED | OUTPATIENT
Start: 2020-10-31 | End: 2020-11-12

## 2020-10-31 RX ADMIN — PANTOPRAZOLE SODIUM 40 MG: 40 TABLET, DELAYED RELEASE ORAL at 08:37

## 2020-10-31 RX ADMIN — ENOXAPARIN SODIUM 40 MG: 40 INJECTION SUBCUTANEOUS at 08:37

## 2020-10-31 NOTE — OUTREACH NOTE
Prep Survey      Responses   [facility] patient discharged from?  [place]   Is LACE score < 7 ?  No   Eligibility  [facility]   Date of Admission  10/27/20   Date of Discharge  10/31/20   Discharge Disposition  Home or Self Care   Discharge diagnosis  Recurrent terminal ileitis/Crohn's diseasel   Open ileocolic resection with incisional hernia repair    Does the patient have one of the following disease processes/diagnoses(primary or secondary)?  General Surgery   Does the patient have Home health ordered?  No   Is there a DME ordered?  No   Prep survey completed?  Yes          Mable Chan RN

## 2020-11-02 ENCOUNTER — TRANSITIONAL CARE MANAGEMENT TELEPHONE ENCOUNTER (OUTPATIENT)
Dept: CALL CENTER | Facility: HOSPITAL | Age: 50
End: 2020-11-02

## 2020-11-02 NOTE — PROGRESS NOTES
Specialty Pharmacy Note      Name:  Margarito Miles  :  1970  Date:  10/15/2020       Past Medical History:   Diagnosis Date   • Asthma     as a child   • Crohn's disease (CMS/HCC)    • GERD (gastroesophageal reflux disease)    • Nonalcoholic fatty liver disease without nonalcoholic steatohepatitis (MACDONALD)    • Sleep apnea     Bi-PAP       Past Surgical History:   Procedure Laterality Date   • APPENDECTOMY N/A     Dr. Nikky Cloud   • COLON RESECTION N/A 10/27/2020    Procedure: OPEN ILEOCOLIC RESECTION AND OPEN INCISIONAL HERNIA  REPAIR;  Surgeon: Troy Guardado MD;  Location: [facility] MAIN OR;  Service: General;  Laterality: N/A;   • COLON SURGERY N/A , 2012 Dr. Dominic Campo,  Dr. Bairon Garcia   • COLONOSCOPY     • [facility]   • ENDOSCOPY N/A     Dr. Jesús Thompson   • LASIK Bilateral    • ORIF ANKLE FRACTURE Left     Dr. Cesar Patton hardware       Social History     Socioeconomic History   • Marital status:      Spouse name: Not on file   • Number of children: Not on file   • Years of education: Not on file   • Highest education level: Not on file   Tobacco Use   • Smoking status: Former Smoker     Packs/day: 1.00     Years: 8.00     Pack years: 8.00     Types: Cigarettes     Quit date:      Years since quittin.8   • Smokeless tobacco: Former User     Quit date:    Substance and Sexual Activity   • Alcohol use: Yes     Comment: 1 drink monthly   • Drug use: Never       Family History   Problem Relation Age of Onset   • Colon cancer Paternal Grandfather    • Abnormal EKG Paternal Grandfather    • Lung cancer Mother    • Colon polyps Other    • Malig Hyperthermia Neg Hx        Allergies   Allergen Reactions   • Sulfa Antibiotics Hives and Other (See Comments)     Fever, chills, flush       Current Outpatient Medications   Medication Sig Dispense Refill   • cyanocobalamin 1000 MCG/ML injection  Inject 1 mL under the skin into the appropriate area as directed 1 (One) Time Per Week. 1000 mL 7   • esomeprazole (nexIUM) 40 MG capsule Take 40 mg by mouth Every Morning Before Breakfast.     • HYDROcodone-acetaminophen (Norco) 5-325 MG per tablet 1-2 by mouth every four hours as needed for pain 20 tablet 0   • KRILL OIL PO Take 1 tablet by mouth Daily.     • Loratadine (CLARITIN) 10 MG capsule Take 10 mg by mouth Daily.     • Milk Thistle 200 MG capsule Take 200 mg by mouth Daily.     • Multiple Vitamins-Minerals (MULTIVITAMIN ADULT EXTRA C PO) Take 1 tablet by mouth Daily.     • Multiple Vitamins-Minerals (ZINC PO) Take 1 tablet by mouth Daily. zinc     • Ustekinumab (STELARA) 90 MG/ML solution prefilled syringe Injection Inject 90 mg under the skin into the appropriate area as directed Every 28 (Twenty-Eight) Days. 1 mL 11   • vitamin E 400 UNIT capsule Take 400 Units by mouth Daily.       No current facility-administered medications for this visit.          LABORATORY:    Lab Results   Component Value Date    WBC 11.43 (H) 10/28/2020    HGB 14.1 10/28/2020    HCT 39.7 10/28/2020    MCV 85.7 10/28/2020    RDW 13.1 10/28/2020     10/28/2020    NEUTRORELPCT 63.1 10/22/2020    LYMPHORELPCT 25.3 10/22/2020    MONORELPCT 8.8 10/22/2020    EOSRELPCT 2.0 10/22/2020    BASORELPCT 0.4 10/22/2020    NEUTROABS 3.16 10/22/2020    LYMPHSABS 1.27 10/22/2020       Lab Results   Component Value Date     10/28/2020    K 4.3 10/28/2020    CO2 27.0 10/28/2020     10/28/2020    BUN 10 10/28/2020    CREATININE 1.24 10/28/2020    GLUCOSE 132 (H) 10/28/2020    CALCIUM 8.5 (L) 10/28/2020    ALKPHOS 77 02/19/2020    AST 20 02/19/2020    ALT 30 02/19/2020    BILITOT 0.7 02/19/2020    ALBUMIN 4.30 02/19/2020    PROTEINTOT 7.1 05/28/2019       No results found for: LDH, URICACID     Imaging Results (Last 24 Hours)     ** No results found for the last 24 hours. **          ASSESSMENT/PLAN:    Patient Update Assessment  (new medications, allergies, medical history): Reviewed     Medication(s): Stelara 90 MG/ML solution prefilled syringe Injection.     Currently Taking Medication(s): Yes, patient reports taking medication as directed.      Effectiveness of Medication: Effective.     Experiencing Side Effects: Not experiencing side effects, patient tolerating medication well.         Prior Authorization Status: Approved.     Financial Assistance Status: None needed at this time, co-pay reaming due from patient is $5.      Any Issues Identified: No issues expressed from patient, no issues processing refill.     Appropriate to Process Prescription(s):  Yes.  Medication refill will be dispensed from [facility] and delivered via home delivery services per patient request.     Counseling Offered: Patient previously counseled upon initiation of therapy, aware to contact pharmacy with any new questions or concerns.     Next Specialty Pharmacy Visit:  Scheduled in four weeks.

## 2020-11-02 NOTE — OUTREACH NOTE
Call Center TCM Note      Responses   [facility] patient discharged from?  [place]   Does the patient have one of the following disease processes/diagnoses(primary or secondary)?  General Surgery   TCM attempt successful?  No   Unsuccessful attempts  Attempt 1          Kellen Lennon MA    11/2/2020, 16:08 EST

## 2020-11-02 NOTE — PROGRESS NOTES
Case Management Discharge Note      Final Note: Home--no needs    Provided Post Acute Provider List?: N/A    Selected Continued Care - Discharged on 10/31/2020 Admission date: 10/27/2020 - Discharge disposition: Home or Self Care    Destination    No services have been selected for the patient.              Durable Medical Equipment    No services have been selected for the patient.              Dialysis/Infusion    No services have been selected for the patient.              Home Medical Care    No services have been selected for the patient.              Therapy    No services have been selected for the patient.              Community Resources    No services have been selected for the patient.                       Final Discharge Disposition Code: 01 - home or self-care

## 2020-11-02 NOTE — OUTREACH NOTE
Call Center TCM Note      Responses   [facility] patient discharged from?  [place]   Does the patient have one of the following disease processes/diagnoses(primary or secondary)?  General Surgery   TCM attempt successful?  No   Unsuccessful attempts  Attempt 2          Kellen Lennon MA    11/2/2020, 16:45 EST

## 2020-11-03 ENCOUNTER — TRANSITIONAL CARE MANAGEMENT TELEPHONE ENCOUNTER (OUTPATIENT)
Dept: CALL CENTER | Facility: HOSPITAL | Age: 50
End: 2020-11-03

## 2020-11-03 NOTE — OUTREACH NOTE
Call Center TCM Note      Responses   [facility] patient discharged from?  [place]   Does the patient have one of the following disease processes/diagnoses(primary or secondary)?  General Surgery   TCM attempt successful?  Yes   Discharge diagnosis  Recurrent terminal ileitis/Crohn's diseasel   Open ileocolic resection with incisional hernia repair    Meds reviewed with patient/caregiver?  Yes   Is the patient having any side effects they believe may be caused by any medication additions or changes?  No   Does the patient have all medications related to this admission filled (includes all antibiotics, pain medications, etc.)  Yes   Is the patient taking all medications as directed (includes completed medication regime)?  Yes   Does the patient have a follow up appointment scheduled with their surgeon?  Yes   Has the patient kept scheduled appointments due by today?  Yes   Comments  post op 11/12/2020   Has home health visited the patient within 72 hours of discharge?  N/A   Did the patient receive a copy of their discharge instructions?  Yes   Nursing interventions  Reviewed instructions with patient   What is the patient's perception of their health status since discharge?  Improving   Is the patient /caregiver able to teach back basic post-op care?  Continue use of incentive spirometry at least 1 week post discharge, Drive as instructed by MD in discharge instructions, No tub bath, swimming, or hot tub until instructed by MD, Do not remove steri-strips, Lifting as instructed by MD in discharge instructions, Keep incision areas clean,dry and protected, Take showers only when approved by MD-sponge bathe until then, Practice 'cough and deep breath'   Is the patient/caregiver able to teach back signs and symptoms of incisional infection?  Increased redness, swelling or pain at the incisonal site, Pus or odor from incision, Fever, Increased drainage or bleeding, Incisional warmth   Is the patient/caregiver  able to teach back steps to recovery at home?  Set small, achievable goals for return to baseline health, Practice good oral hygiene, Eat a well-balance diet, Rest and rebuild strength, gradually increase activity, Weigh daily, Make a list of questions for surgeon's appointment   Is the patient/caregiver able to teach back the hierarchy of who to call/visit for symptoms/problems? PCP, Specialist, Home health nurse, Urgent Care, ED, 911  Yes   TCM call completed?  Yes   Wrap up additional comments  Pt doing well s/p ileocolic resection and open hernia repair. Pt states pain is not bad and well managed. Post op is 11/12/2020, pt does not wish to sched TCM FWP at this time.           Kellen Lennon MA    11/3/2020, 16:28 EST

## 2020-11-09 ENCOUNTER — READMISSION MANAGEMENT (OUTPATIENT)
Dept: CALL CENTER | Facility: HOSPITAL | Age: 50
End: 2020-11-09

## 2020-11-09 NOTE — OUTREACH NOTE
General Surgery Week 2 Survey      Responses   [facility] patient discharged from?  [place]   Does the patient have one of the following disease processes/diagnoses(primary or secondary)?  General Surgery   Week 2 attempt successful?  Yes   Call start time  1632   Call end time  1633   Discharge diagnosis  Recurrent terminal ileitis/Crohn's diseasel   Open ileocolic resection with incisional hernia repair    Meds reviewed with patient/caregiver?  Yes   Is the patient having any side effects they believe may be caused by any medication additions or changes?  No   Does the patient have all medications related to this admission filled (includes all antibiotics, pain medications, etc.)  Yes   Is the patient taking all medications as directed (includes completed medication regime)?  Yes   Does the patient have a follow up appointment scheduled with their surgeon?  Yes   Has the patient kept scheduled appointments due by today?  Yes   Has home health visited the patient within 72 hours of discharge?  N/A   Psychosocial issues?  No   Did the patient receive a copy of their discharge instructions?  Yes   Nursing interventions  Reviewed instructions with patient   What is the patient's perception of their health status since discharge?  Improving   Nursing interventions  Nurse provided patient education   Is the patient /caregiver able to teach back basic post-op care?  Continue use of incentive spirometry at least 1 week post discharge, Drive as instructed by MD in discharge instructions, No tub bath, swimming, or hot tub until instructed by MD, Do not remove steri-strips, Lifting as instructed by MD in discharge instructions, Keep incision areas clean,dry and protected, Take showers only when approved by MD-sponge bathe until then, Practice 'cough and deep breath'   Is the patient/caregiver able to teach back signs and symptoms of incisional infection?  Increased redness, swelling or pain at the incisonal site,  Pus or odor from incision, Fever, Increased drainage or bleeding, Incisional warmth   Is the patient/caregiver able to teach back steps to recovery at home?  Set small, achievable goals for return to baseline health, Practice good oral hygiene, Eat a well-balance diet, Rest and rebuild strength, gradually increase activity, Weigh daily, Make a list of questions for surgeon's appointment   Is the patient/caregiver able to teach back the hierarchy of who to call/visit for symptoms/problems? PCP, Specialist, Home health nurse, Urgent Care, ED, 911  Yes   Week 2 call completed?  Yes          Dario Acuna RN

## 2020-11-10 ENCOUNTER — SPECIALTY PHARMACY (OUTPATIENT)
Dept: PHARMACY | Facility: HOSPITAL | Age: 50
End: 2020-11-10

## 2020-11-12 ENCOUNTER — OFFICE VISIT (OUTPATIENT)
Dept: SURGERY | Facility: CLINIC | Age: 50
End: 2020-11-12

## 2020-11-12 DIAGNOSIS — Z09 FOLLOW UP: Primary | ICD-10-CM

## 2020-11-12 PROCEDURE — 99024 POSTOP FOLLOW-UP VISIT: CPT | Performed by: SURGERY

## 2020-11-13 NOTE — PROGRESS NOTES
Postoperative visit    Open ileocolic resection with incisional hernia repair 10/27/2020    Final pathology:  Terminal ileitis, cryptitis, crypt abscess formation, fissuring, ulceration, transmural chronic inflammation and thickening consistent with Crohn's disease    Office visit: Incision has healed well.  Activity restrictions discussed.  Follow-up as needed.

## 2020-11-17 ENCOUNTER — READMISSION MANAGEMENT (OUTPATIENT)
Dept: CALL CENTER | Facility: HOSPITAL | Age: 50
End: 2020-11-17

## 2020-11-17 NOTE — OUTREACH NOTE
General Surgery Week 3 Survey      Responses   [facility] patient discharged from?  [place]   Does the patient have one of the following disease processes/diagnoses(primary or secondary)?  General Surgery   Week 3 attempt successful?  Yes   Call start time  1405   Call end time  1405   Discharge diagnosis  Recurrent terminal ileitis/Crohn's diseasel   Open ileocolic resection with incisional hernia repair    What is the patient's perception of their health status since discharge?  Improving   Is the patient/caregiver able to teach back the hierarchy of who to call/visit for symptoms/problems? PCP, Specialist, Home health nurse, Urgent Care, ED, 911  Yes   Week 3 call completed?  Yes   Revoked  No further contact(revokes)-requires comment   Graduated/Revoked comments  Patient says he is doing great, no further calls needed.          Jeannie Davis RN

## 2020-11-17 NOTE — PROGRESS NOTES
Specialty Pharmacy Note      Name:  Margarito Miles  :  1970  Date:  2020    Past Medical History:   Diagnosis Date   • Asthma     as a child   • Crohn's disease (CMS/HCC)    • GERD (gastroesophageal reflux disease)    • Nonalcoholic fatty liver disease without nonalcoholic steatohepatitis (MACDONALD)    • Sleep apnea     Bi-PAP       Past Surgical History:   Procedure Laterality Date   • APPENDECTOMY N/A     Dr. Nikky Cloud   • COLON RESECTION N/A 10/27/2020    Procedure: OPEN ILEOCOLIC RESECTION AND OPEN INCISIONAL HERNIA  REPAIR;  Surgeon: Troy Guarddao MD;  Location: [facility] MAIN OR;  Service: General;  Laterality: N/A;   • COLON SURGERY N/A , 2012 Dr. Dominic Campo,  Dr. Bairon Garcia   • COLONOSCOPY     • [facility]   • ENDOSCOPY N/A     Dr. Jesús Thompson   • LASIK Bilateral    • ORIF ANKLE FRACTURE Left     Dr. Cesar Patton [employer]       Social History     Socioeconomic History   • Marital status:      Spouse name: Not on file   • Number of children: Not on file   • Years of education: Not on file   • Highest education level: Not on file   Tobacco Use   • Smoking status: Former Smoker     Packs/day: 1.00     Years: 8.00     Pack years: 8.00     Types: Cigarettes     Quit date:      Years since quittin.8   • Smokeless tobacco: Former User     Quit date:    Substance and Sexual Activity   • Alcohol use: Yes     Comment: 1 drink monthly   • Drug use: Never       Family History   Problem Relation Age of Onset   • Colon cancer Paternal Grandfather    • Abnormal EKG Paternal Grandfather    • Lung cancer Mother    • Colon polyps Other    • Malig Hyperthermia Neg Hx        Allergies   Allergen Reactions   • Sulfa Antibiotics Hives and Other (See Comments)     Fever, chills, flush       Current Outpatient Medications   Medication Sig Dispense Refill   • cyanocobalamin 1000 MCG/ML injection  Inject 1 mL under the skin into the appropriate area as directed 1 (One) Time Per Week. 1000 mL 7   • esomeprazole (nexIUM) 40 MG capsule Take 40 mg by mouth Every Morning Before Breakfast.     • KRILL OIL PO Take 1 tablet by mouth Daily.     • Loratadine (CLARITIN) 10 MG capsule Take 10 mg by mouth Daily.     • Milk Thistle 200 MG capsule Take 200 mg by mouth Daily.     • Multiple Vitamins-Minerals (MULTIVITAMIN ADULT EXTRA C PO) Take 1 tablet by mouth Daily.     • Multiple Vitamins-Minerals (ZINC PO) Take 1 tablet by mouth Daily. zinc     • Ustekinumab (STELARA) 90 MG/ML solution prefilled syringe Injection Inject 90 mg under the skin into the appropriate area as directed Every 28 (Twenty-Eight) Days. 1 mL 11   • vitamin E 400 UNIT capsule Take 400 Units by mouth Daily.       No current facility-administered medications for this visit.          LABORATORY:    Lab Results   Component Value Date    WBC 11.43 (H) 10/28/2020    HGB 14.1 10/28/2020    HCT 39.7 10/28/2020    MCV 85.7 10/28/2020    RDW 13.1 10/28/2020     10/28/2020    NEUTRORELPCT 63.1 10/22/2020    LYMPHORELPCT 25.3 10/22/2020    MONORELPCT 8.8 10/22/2020    EOSRELPCT 2.0 10/22/2020    BASORELPCT 0.4 10/22/2020    NEUTROABS 3.16 10/22/2020    LYMPHSABS 1.27 10/22/2020       Lab Results   Component Value Date     10/28/2020    K 4.3 10/28/2020    CO2 27.0 10/28/2020     10/28/2020    BUN 10 10/28/2020    CREATININE 1.24 10/28/2020    GLUCOSE 132 (H) 10/28/2020    CALCIUM 8.5 (L) 10/28/2020    ALKPHOS 77 02/19/2020    AST 20 02/19/2020    ALT 30 02/19/2020    BILITOT 0.7 02/19/2020    ALBUMIN 4.30 02/19/2020    PROTEINTOT 7.1 05/28/2019       No results found for: LDH, URICACID     Imaging Results (Last 24 Hours)     ** No results found for the last 24 hours. **          ASSESSMENT/PLAN:    Patient Update Assessment (new medications, allergies, medical history): Reviewed     Medication(s): Stelara 90 MG/ML solution prefilled syringe  Injection.     Currently Taking Medication(s): Yes, patient reports taking medication as directed.      Effectiveness of Medication: Effective.     Experiencing Side Effects: Not experiencing side effects, patient tolerating medication well.         Prior Authorization Status: Approved.     Financial Assistance Status: None needed at this time, co-pay reaming due from patient is $5.      Any Issues Identified: No issues expressed from patient, no issues processing refill.     Appropriate to Process Prescription(s):  Yes.  Medication refill will be dispensed from [facility] and delivered via home delivery services per patient request.     Counseling Offered: Patient previously counseled upon initiation of therapy, aware to contact pharmacy with any new questions or concerns.     Next Specialty Pharmacy Visit:  Scheduled in four weeks.

## 2020-11-20 ENCOUNTER — TELEPHONE (OUTPATIENT)
Dept: SURGERY | Facility: CLINIC | Age: 50
End: 2020-11-20

## 2020-11-20 NOTE — TELEPHONE ENCOUNTER
"Patient called to report a pinhole opening in his incision approx 2\" above the naval with serous drainage s/p open ileocolic resection with incisional hernia repair 10/27/20. The drainage does not have an odor and he denies fever or redness. He states that when he presses on his abdomen it jiggles like their may be a fluid collection underneath. He reports only a quarter sized amount on his bandage every few hours. Advised to continue to keep the area clean and covered and to call if he develops any swelling or if the drainage increases. Any further recommendations? Do you want to see in office or have pt call if symptoms worsen?  "

## 2020-12-29 ENCOUNTER — TELEPHONE (OUTPATIENT)
Dept: SURGERY | Facility: CLINIC | Age: 50
End: 2020-12-29

## 2020-12-29 NOTE — TELEPHONE ENCOUNTER
Pt is s/p colon resection 10/27. Called this morning stating he coughed last night and now has abd mass protruding. Mild discomfort, no discoloration around area. Called answering service last night and was informed to call and try to make appt today. He is aware you are in surgery today. Should I make appt for next available? Have Yash see him this afternoon? Or work him in Thursday morning before your cases?

## 2020-12-30 ENCOUNTER — HOSPITAL ENCOUNTER (OUTPATIENT)
Dept: CT IMAGING | Facility: HOSPITAL | Age: 50
Discharge: HOME | End: 2020-12-30
Admitting: SURGERY

## 2020-12-30 ENCOUNTER — OFFICE VISIT (OUTPATIENT)
Dept: SURGERY | Facility: CLINIC | Age: 50
End: 2020-12-30

## 2020-12-30 DIAGNOSIS — R19.06 EPIGASTRIC MASS: ICD-10-CM

## 2020-12-30 DIAGNOSIS — R10.13 EPIGASTRIC PAIN: ICD-10-CM

## 2020-12-30 DIAGNOSIS — R10.13 EPIGASTRIC PAIN: Primary | ICD-10-CM

## 2020-12-30 PROCEDURE — 99024 POSTOP FOLLOW-UP VISIT: CPT | Performed by: SURGERY

## 2020-12-30 PROCEDURE — 74176 CT ABD & PELVIS W/O CONTRAST: CPT

## 2020-12-30 RX ORDER — FLUTICASONE PROPIONATE 50 MCG
2 SPRAY, SUSPENSION (ML) NASAL DAILY PRN
COMMUNITY

## 2020-12-30 NOTE — PROGRESS NOTES
"Returns indicating that several days ago he sneezed and felt a \"pop\" in the upper aspect of his midline incision.  Associated with visible and palpable bulge.  It is tender at rest but worse with activity.    On examination his abdomen is soft and nondistended.  He has palpable fullness in the midepigastrium and what feels like a seroma but no definite evidence of fascial disruption, does not feel typical for hernia.    Will obtain stat CT to evaluate further  "

## 2021-01-04 ENCOUNTER — PREP FOR SURGERY (OUTPATIENT)
Dept: OTHER | Facility: HOSPITAL | Age: 51
End: 2021-01-04

## 2021-01-04 ENCOUNTER — TELEPHONE (OUTPATIENT)
Dept: SURGERY | Facility: CLINIC | Age: 51
End: 2021-01-04

## 2021-01-04 DIAGNOSIS — K43.2 INCISIONAL HERNIA: Primary | ICD-10-CM

## 2021-01-04 RX ORDER — CEFAZOLIN SODIUM 2 G/100ML
2 INJECTION, SOLUTION INTRAVENOUS ONCE
Status: CANCELLED | OUTPATIENT
Start: 2021-01-26 | End: 2021-01-04

## 2021-01-11 NOTE — PROGRESS NOTES
Chief Complaint  crohns disease   Surgery October 27--minimal amount removed in the same area.  Not close to short bowel syndrome--felt like all that was visible was removed   Did well by 6 weeks post op  Kept on with erika.    8 weeks to the day of surgery sneezed and popped a significant hernia.  Seroma.  Needs surgery January 25--ventral hernia     Patient is currently somewhat disheartened about the need to have a repeat surgery.  He is thankful this is not because of Crohn's but is not looking forward to having to have another operation.  He is doing well he received both doses of his Covid vaccine without incident.  He is not complaining of any hematochezia or significant diarrhea or nausea or vomiting and is doing okay from a GI standpoint    Review of systems: Fatigue, muscle soreness from Covid shot, hernia  History of Present Illness         Margarito Miles presents to [facility] for     Physical Exam - telephone visit    Result Review :            SCANNED - COLONOSCOPY (07/10/2020)  CT neeta abd pelvis wo contrast with Troy Guardado MD (12/30/2020)  Office Visit with Troy Guardado MD (11/12/2020)  Surgery with Troy Guardado MD (10/27/2020)  Tissue Pathology Exam (10/27/2020 15:25)       Assessment and Plan    Problem List Items Addressed This Visit        Other    Crohn's disease of small intestine without complication (CMS/HCC)    MACDONALD (nonalcoholic steatohepatitis)    Crohn's disease of small intestine with intestinal obstruction (CMS/HCC) - Primary    Overview     Added automatically from request for surgery [id number]           1.  Status post reresection October 2020.  This is ongoing and in need of surgical repair due to a hernia that occurred from sneezing 8 weeks postop.  He is also had a seroma that has been part of his postop recovery.  Our understanding is that all active disease was removed at the time of his surgery and he has been without a  therapeutic intervention as he has been on ongoing Stelara  2.  History of colon polyps  3.  Family history of colon cancer  4.  Cholelithiasis  5.  Indeterminate lesion on CT scan.  Patient has a history of hepatic steatosis has had a liver biopsy years ago with fatty liver and no cirrhosis patient has seen Dr. Spring at [facility] before for this reason  6.  High risk medication use with Stelara  7.  No evidence of short gut by healthy anatomy and amount of remaining bowel.  8.  GERD    Plan  1.  Continue Stelara  2.  Obtain MR of the liver to evaluate for liver lesion  3.  May need to have consultation with Dr. Barrientos again down the road based on what this work-up leads to  4.  Based on the MRI will need to determine whether a biopsy needs to be obtained or not and if it needs to be obtained whether it needs to be obtained through radiology or potentially during his upcoming surgery  5.  Need to consider the value of a repeat liver biopsy to be done at his upcoming hernia surgery  6.  Need to consider cholecystectomy as a possibility as he has cholelithiasis and with a history of Crohn's, multiple resections, scar tissue, hernia with mesh planned it is possible that avoiding further surgery down the road from a cholecystectomy or concern about cause of abdominal pain could be reasonable we will discuss this with the patient's general surgeon  7.  Continue aggressive management of his Crohn's including early endoscopic evaluation for evaluation of recurrence and adjustment of medications as appropriate  8.  Continue medications and continue health maintenance  9.  Continue frequent colonoscopy for family history of colon cancer and personal history of colon polyps    Follow Up   No follow-ups on file.  Patient was given instructions and counseling regarding his condition or for health maintenance advice. Please see specific information pulled into the AVS if appropriate.     You have chosen to  receive care through a telephone visit. Do you consent to use a telephone visit for your medical care today? Yes    This visit has been rescheduled as a phone visit to comply with patient safety concerns in accordance with CDC recommendations. Total time of discussion was 17 minutes.

## 2021-01-12 ENCOUNTER — OFFICE VISIT (OUTPATIENT)
Dept: GASTROENTEROLOGY | Facility: CLINIC | Age: 51
End: 2021-01-12

## 2021-01-12 DIAGNOSIS — K50.00 CROHN'S DISEASE OF SMALL INTESTINE WITHOUT COMPLICATION (HCC): ICD-10-CM

## 2021-01-12 DIAGNOSIS — K75.81 NASH (NONALCOHOLIC STEATOHEPATITIS): ICD-10-CM

## 2021-01-12 DIAGNOSIS — K76.9 LIVER LESION: ICD-10-CM

## 2021-01-12 DIAGNOSIS — K50.012 CROHN'S DISEASE OF SMALL INTESTINE WITH INTESTINAL OBSTRUCTION (HCC): Primary | ICD-10-CM

## 2021-01-12 PROCEDURE — 99442 PR PHYS/QHP TELEPHONE EVALUATION 11-20 MIN: CPT | Performed by: INTERNAL MEDICINE

## 2021-01-13 ENCOUNTER — TRANSCRIBE ORDERS (OUTPATIENT)
Dept: PREADMISSION TESTING | Facility: HOSPITAL | Age: 51
End: 2021-01-13

## 2021-01-13 DIAGNOSIS — Z01.818 OTHER SPECIFIED PRE-OPERATIVE EXAMINATION: Primary | ICD-10-CM

## 2021-01-19 ENCOUNTER — APPOINTMENT (OUTPATIENT)
Dept: PREADMISSION TESTING | Facility: HOSPITAL | Age: 51
End: 2021-01-19

## 2021-01-19 ENCOUNTER — TELEPHONE (OUTPATIENT)
Dept: SURGERY | Facility: CLINIC | Age: 51
End: 2021-01-19

## 2021-01-19 VITALS
OXYGEN SATURATION: 97 % | TEMPERATURE: 98.1 F | WEIGHT: 227 LBS | DIASTOLIC BLOOD PRESSURE: 87 MMHG | SYSTOLIC BLOOD PRESSURE: 132 MMHG | RESPIRATION RATE: 20 BRPM | HEART RATE: 73 BPM | BODY MASS INDEX: 30.75 KG/M2 | HEIGHT: 72 IN

## 2021-01-19 LAB
ANION GAP SERPL CALCULATED.3IONS-SCNC: 11.6 MMOL/L (ref 5–15)
BUN SERPL-MCNC: 16 MG/DL (ref 6–20)
BUN/CREAT SERPL: 16.5 (ref 7–25)
CALCIUM SPEC-SCNC: 9 MG/DL (ref 8.6–10.5)
CHLORIDE SERPL-SCNC: 104 MMOL/L (ref 98–107)
CO2 SERPL-SCNC: 23.4 MMOL/L (ref 22–29)
CREAT SERPL-MCNC: 0.97 MG/DL (ref 0.76–1.27)
DEPRECATED RDW RBC AUTO: 42.3 FL (ref 37–54)
ERYTHROCYTE [DISTWIDTH] IN BLOOD BY AUTOMATED COUNT: 13.5 % (ref 12.3–15.4)
GFR SERPL CREATININE-BSD FRML MDRD: 83 ML/MIN/1.73
GLUCOSE SERPL-MCNC: 86 MG/DL (ref 65–99)
HCT VFR BLD AUTO: 40 % (ref 37.5–51)
HGB BLD-MCNC: 13.6 G/DL (ref 13–17.7)
MCH RBC QN AUTO: 29.5 PG (ref 26.6–33)
MCHC RBC AUTO-ENTMCNC: 34 G/DL (ref 31.5–35.7)
MCV RBC AUTO: 86.8 FL (ref 79–97)
PLATELET # BLD AUTO: 204 10*3/MM3 (ref 140–450)
PMV BLD AUTO: 10.7 FL (ref 6–12)
POTASSIUM SERPL-SCNC: 3.7 MMOL/L (ref 3.5–5.2)
RBC # BLD AUTO: 4.61 10*6/MM3 (ref 4.14–5.8)
SODIUM SERPL-SCNC: 139 MMOL/L (ref 136–145)
WBC # BLD AUTO: 6.98 10*3/MM3 (ref 3.4–10.8)

## 2021-01-19 PROCEDURE — 80048 BASIC METABOLIC PNL TOTAL CA: CPT

## 2021-01-19 PROCEDURE — 36415 COLL VENOUS BLD VENIPUNCTURE: CPT

## 2021-01-19 PROCEDURE — 85027 COMPLETE CBC AUTOMATED: CPT

## 2021-01-19 RX ORDER — CHLORHEXIDINE GLUCONATE 500 MG/1
1 CLOTH TOPICAL
COMMUNITY
End: 2021-01-28 | Stop reason: HOSPADM

## 2021-01-19 RX ORDER — MULTIPLE VITAMINS W/ MINERALS TAB 9MG-400MCG
1 TAB ORAL DAILY
COMMUNITY

## 2021-01-19 NOTE — TELEPHONE ENCOUNTER
Patient was wanting to discuss with Dr. Guardado regarding grady sx when he has his hernia repair. Stated that Dr. Thompson had discussed with him his recommendation for gallbladder surgery. Patient wanting to know if can take care of both in same surgery.       Office Visit    1/12/2021  [facility]STROENTEROLOG[facility]   Jesús Thompson MD  Gastroenterology  Crohn's disease of small intestine with intestinal obstruction (CMS/HCC) +3 more  Dx   Progress Notes    Expand All Collapse All    Chief Complaint  crohns disease   Surgery October 27--minimal amount removed in the same area.  Not close to short bowel syndrome--felt like all that was visible was removed   Did well by 6 weeks post op  Kept on with selara.    8 weeks to the day of surgery sneezed and popped a significant hernia.  Seroma.  Needs surgery January 25--ventral hernia      Patient is currently somewhat disheartened about the need to have a repeat surgery.  He is thankful this is not because of Crohn's but is not looking forward to having to have another operation.  He is doing well he received both doses of his Covid vaccine without incident.  He is not complaining of any hematochezia or significant diarrhea or nausea or vomiting and is doing okay from a GI standpoint     Review of systems: Fatigue, muscle soreness from Covid shot, hernia  History of Present Illness          Margarito Miles presents to [facility]STRO[facility] for      Physical Exam - telephone visit        Result Review    :              SCANNED - COLONOSCOPY (07/10/2020)  CT neeta abd pelvis wo contrast with Troy Guardado MD (12/30/2020)  Office Visit with Troy Guardado MD (11/12/2020)  Surgery with Troy Guardado MD (10/27/2020)  Tissue Pathology Exam (10/27/2020 15:25)        Assessment      Assessment and Plan         Problem List Items Addressed This Visit                 Other      Crohn's disease of small intestine without  complication (CMS/HCC)      MACDONALD (nonalcoholic steatohepatitis)      Crohn's disease of small intestine with intestinal obstruction (CMS/HCC) - Primary      Overview        Added automatically from request for surgery [id number]              1.  Status post reresection October 2020.  This is ongoing and in need of surgical repair due to a hernia that occurred from sneezing 8 weeks postop.  He is also had a seroma that has been part of his postop recovery.  Our understanding is that all active disease was removed at the time of his surgery and he has been without a therapeutic intervention as he has been on ongoing Stelara  2.  History of colon polyps  3.  Family history of colon cancer  4.  Cholelithiasis  5.  Indeterminate lesion on CT scan.  Patient has a history of hepatic steatosis has had a liver biopsy years ago with fatty liver and no cirrhosis patient has seen Dr. Spring at [facility] before for this reason  6.  High risk medication use with Stelara  7.  No evidence of short gut by healthy anatomy and amount of remaining bowel.  8.  GERD     Plan  1.  Continue Stelara  2.  Obtain MR of the liver to evaluate for liver lesion  3.  May need to have consultation with Dr. Barrientos again down the road based on what this work-up leads to  4.  Based on the MRI will need to determine whether a biopsy needs to be obtained or not and if it needs to be obtained whether it needs to be obtained through radiology or potentially during his upcoming surgery  5.  Need to consider the value of a repeat liver biopsy to be done at his upcoming hernia surgery  6.  Need to consider cholecystectomy as a possibility as he has cholelithiasis and with a history of Crohn's, multiple resections, scar tissue, hernia with mesh planned it is possible that avoiding further surgery down the road from a cholecystectomy or concern about cause of abdominal pain could be reasonable we will discuss this with the patient's general  surgeon  7.  Continue aggressive management of his Crohn's including early endoscopic evaluation for evaluation of recurrence and adjustment of medications as appropriate  8.  Continue medications and continue health maintenance  9.  Continue frequent colonoscopy for family history of colon cancer and personal history of colon polyps     Follow Up   No follow-ups on file.  Patient was given instructions and counseling regarding his condition or for health maintenance advice. Please see specific information pulled into the AVS if appropriate.      You have chosen to receive care through a telephone visit. Do you consent to use a telephone visit for your medical care today? Yes     This visit has been rescheduled as a phone visit to comply with patient safety concerns in accordance with CDC recommendations. Total time of discussion was 17 minutes.           e care of both during surgery on 01/26/21.

## 2021-01-19 NOTE — TELEPHONE ENCOUNTER
Patient inquiring about grady surgery and if Dr. Guardado had seen recommendation from Dr. Barbour. Wants to know if possible to do Hernia repair and grady surgery at same time.               Office Visit    1/12/2021  [facility] GASTROENTEROLOGY   Jesús Thompson MD  Gastroenterology  Crohn's disease of small intestine with intestinal obstruction (CMS/HCC) +3 more  Dx   Progress Notes    Expand All Collapse All    Chief Complaint  crohns disease   Surgery October 27--minimal amount removed in the same area.  Not close to short bowel syndrome--felt like all that was visible was removed   Did well by 6 weeks post op  Kept on with selara.    8 weeks to the day of surgery sneezed and popped a significant hernia.  Seroma.  Needs surgery January 25--ventral hernia      Patient is currently somewhat disheartened about the need to have a repeat surgery.  He is thankful this is not because of Crohn's but is not looking forward to having to have another operation.  He is doing well he received both doses of his Covid vaccine without incident.  He is not complaining of any hematochezia or significant diarrhea or nausea or vomiting and is doing okay from a GI standpoint     Review of systems: Fatigue, muscle soreness from Covid shot, hernia  History of Present Illness          Margarito Miles presents to [facility] GASTROENTEROLOGY for      Physical Exam - telephone visit        Result Review    :              SCANNED - COLONOSCOPY (07/10/2020)  CT neeta abd pelvis wo contrast with Troy Guardado MD (12/30/2020)  Office Visit with Troy Guardado MD (11/12/2020)  Surgery with Troy Guardado MD (10/27/2020)  Tissue Pathology Exam (10/27/2020 15:25)        Assessment      Assessment and Plan         Problem List Items Addressed This Visit                 Other      Crohn's disease of small intestine without complication (CMS/HCC)      MACDONALD (nonalcoholic steatohepatitis)       Crohn's disease of small intestine with intestinal obstruction (CMS/HCC) - Primary      Overview        Added automatically from request for surgery [id number]              1.  Status post reresection October 2020.  This is ongoing and in need of surgical repair due to a hernia that occurred from sneezing 8 weeks postop.  He is also had a seroma that has been part of his postop recovery.  Our understanding is that all active disease was removed at the time of his surgery and he has been without a therapeutic intervention as he has been on ongoing Stelara  2.  History of colon polyps  3.  Family history of colon cancer  4.  Cholelithiasis  5.  Indeterminate lesion on CT scan.  Patient has a history of hepatic steatosis has had a liver biopsy years ago with fatty liver and no cirrhosis patient has seen Dr. Spring at [facility] before for this reason  6.  High risk medication use with Stelara  7.  No evidence of short gut by healthy anatomy and amount of remaining bowel.  8.  GERD     Plan  1.  Continue Stelara  2.  Obtain MR of the liver to evaluate for liver lesion  3.  May need to have consultation with Dr. Barrientos again down the road based on what this work-up leads to  4.  Based on the MRI will need to determine whether a biopsy needs to be obtained or not and if it needs to be obtained whether it needs to be obtained through radiology or potentially during his upcoming surgery  5.  Need to consider the value of a repeat liver biopsy to be done at his upcoming hernia surgery  6.  Need to consider cholecystectomy as a possibility as he has cholelithiasis and with a history of Crohn's, multiple resections, scar tissue, hernia with mesh planned it is possible that avoiding further surgery down the road from a cholecystectomy or concern about cause of abdominal pain could be reasonable we will discuss this with the patient's general surgeon  7.  Continue aggressive management of his Crohn's including  early endoscopic evaluation for evaluation of recurrence and adjustment of medications as appropriate  8.  Continue medications and continue health maintenance  9.  Continue frequent colonoscopy for family history of colon cancer and personal history of colon polyps     Follow Up   No follow-ups on file.  Patient was given instructions and counseling regarding his condition or for health maintenance advice. Please see specific information pulled into the AVS if appropriate.      You have chosen to receive care through a telephone visit. Do you consent to use a telephone visit for your medical care today? Yes     This visit has been rescheduled as a phone visit to comply with patient safety concerns in accordance with CDC recommendations. Total time of discussion was 17 minutes.

## 2021-01-19 NOTE — DISCHARGE INSTRUCTIONS
Take the following medications the morning of surgery:    nexium  flonase if needed    If you are on prescription narcotic pain medication to control your pain you may also take that medication the morning of surgery.    General Instructions:  • Do not eat solid food after midnight the night before surgery.  • You may drink clear liquids day of surgery but must stop at least one hour before your hospital arrival time.  • It is beneficial for you to have a clear drink that contains carbohydrates the day of surgery.  We suggest a 12 to 20 ounce bottle of Gatorade or Powerade for non-diabetic patients or a 12 to 20 ounce bottle of G2 or Powerade Zero for diabetic patients. (Pediatric patients, are not advised to drink a 12 to 20 ounce carbohydrate drink)    Clear liquids are liquids you can see through.  Nothing red in color.     Plain water                               Sports drinks  Sodas                                   Gelatin (Jell-O)  Fruit juices without pulp such as white grape juice and apple juice  Popsicles that contain no fruit or yogurt  Tea or coffee (no cream or milk added)  Gatorade / Powerade  G2 / Powerade Zero    • Infants may have breast milk up to four hours before surgery.  • Infants drinking formula may drink formula up to six hours before surgery.   • Patients who avoid smoking, chewing tobacco and alcohol for 4 weeks prior to surgery have a reduced risk of post-operative complications.  Quit smoking as many days before surgery as you can.  • Do not smoke, use chewing tobacco or drink alcohol the day of surgery.   • If applicable bring your C-PAP/ BI-PAP machine.  • Bring any papers given to you in the doctor’s office.  • Wear clean comfortable clothes.  • Do not wear contact lenses, false eyelashes or make-up.  Bring a case for your glasses.   • Bring crutches or walker if applicable.  • Remove all piercings.  Leave jewelry and any other valuables at home.  • Hair extensions with metal clips  must be removed prior to surgery.  • The Pre-Admission Testing nurse will instruct you to bring medications if unable to obtain an accurate list in Pre-Admission Testing.        If you were given a blood bank ID arm band remember to bring it with you the day of surgery.    Preventing a Surgical Site Infection:  • For 2 to 3 days before surgery, avoid shaving with a razor because the razor can irritate skin and make it easier to develop an infection.    • Any areas of open skin can increase the risk of a post-operative wound infection by allowing bacteria to enter and travel throughout the body.  Notify your surgeon if you have any skin wounds / rashes even if it is not near the expected surgical site.  The area will need assessed to determine if surgery should be delayed until it is healed.  • The night prior to surgery shower using a fresh bar of anti-bacterial soap (such as Dial) and clean washcloth.  Sleep in a clean bed with clean clothing.  Do not allow pets to sleep with you.  • Shower on the morning of surgery using a fresh bar of anti-bacterial soap (such as Dial) and clean washcloth.  Dry with a clean towel and dress in clean clothing.  • Ask your surgeon if you will be receiving antibiotics prior to surgery.  • Make sure you, your family, and all healthcare providers clean their hands with soap and water or an alcohol based hand  before caring for you or your wound.    Day of surgery:1/26/2021   0700  Your arrival time is approximately two hours before your scheduled surgery time.  Upon arrival, a Pre-op nurse and Anesthesiologist will review your health history, obtain vital signs, and answer questions you may have.  The only belongings needed at this time will be a list of your home medications and if applicable your C-PAP/BI-PAP machine.  A Pre-op nurse will start an IV and you may receive medication in preparation for surgery, including something to help you relax.     Please be aware that  surgery does come with discomfort.  We want to make every effort to control your discomfort so please discuss any uncontrolled symptoms with your nurse.   Your doctor will most likely have prescribed pain medications.      If you are going home after surgery you will receive individualized written care instructions before being discharged.  A responsible adult must drive you to and from the hospital on the day of your surgery and stay with you for 24 hours.  Discharge prescriptions can be filled by the hospital pharmacy during regular pharmacy hours.  If you are having surgery late in the day/evening your prescription may be e-prescribed to your pharmacy.  Please verify your pharmacy hours or chose a 24 hour pharmacy to avoid not having access to your prescription because your pharmacy has closed for the day.    If you are staying overnight following surgery, you will be transported to your hospital room following the recovery period.  [facility] has all private rooms.    If you have any questions please call Pre-Admission Testing at [phone].  Deductibles and co-payments are collected on the day of service. Please be prepared to pay the required co-pay, deductible or deposit on the day of service as defined by your plan.    Patient Education for Self-Quarantine Process    Following your COVID testing, we strongly recommend that you do not leave your home after you have been tested for COVID except to get medical care. This includes not going to work, school or to public areas.  If this is not possible for you to do please limit your activities to only required outings.  Be sure to wear a mask when you are with other people, practice social distancing and wash your hands frequently.      The following items provide additional details to keep you safe.  • Wash your hands with soap and water frequently for at least 20 seconds.   • Avoid touching your eyes, nose and mouth with unwashed hands.  • Do  not share anything - utensils, towels, food from the same bowl.   • Have your own utensils, drinking glass, dishes, towels and bedding.   • Do not have visitors.   • Do use FaceTime to stay in touch with family and friends.  • You should stay in a specific room away from others if possible.   • Stay at least 6 feet away from others in the home if you cannot have a dedicated room to yourself.   • Do not snuggle with your pet. While the CDC says there is no evidence that pets can spread COVID-19 or be infected from humans, it is probably best to avoid “petting, snuggling, being kissed or licked and sharing food (during self-quarantine)”, according to the CDC.   • Sanitize household surfaces daily. Include all high touch areas (door handles, light switches, phones, countertops, etc.)  • Do not share a bathroom with others, if possible.   • Wear a mask around others in your home if you are unable to stay in a separate room or 6 feet apart. If  you are unable to wear a mask, have your family member wear a mask if they must be within 6 feet of you.   Call your surgeon immediately if you experience any of the following symptoms:  • Sore Throat  • Shortness of Breath or difficulty breathing  • Cough  • Chills  • Body soreness or muscle pain  • Headache  • Fever  • New loss of taste or smell  • Do not arrive for your surgery ill.  Your procedure will need to be rescheduled to another time.  You will need to call your physician before the day of surgery to avoid any unnecessary exposure to hospital staff as well as other patients.    CHLORHEXIDINE CLOTH INSTRUCTIONS  The morning of surgery follow these instructions using the Chlorhexidine cloths you've been given.  These steps reduce bacteria on the body.  Do not use the cloths near your eyes, ears mouth, genitalia or on open wounds.  Throw the cloths away after use but do not try to flush them down a toilet.      • Open and remove one cloth at a time from the package.     • Leave the cloth unfolded and begin the bathing.  • Massage the skin with the cloths using gentle pressure to remove bacteria.  Do not scrub harshly.   • Follow the steps below with one 2% CHG cloth per area (6 total cloths).  • One cloth for neck, shoulders and chest.  • One cloth for both arms, hands, fingers and underarms (do underarms last).  • One cloth for the abdomen followed by groin.  • One cloth for right leg and foot including between the toes.  • One cloth for left leg and foot including between the toes.  • The last cloth is to be used for the back of the neck, back and buttocks.    Allow the CHG to air dry 3 minutes on the skin which will give it time to work and decrease the chance of irritation.  The skin may feel sticky until it is dry.  Do not rinse with water or any other liquid or you will lose the beneficial effects of the CHG.  If mild skin irritation occurs, do rinse the skin to remove the CHG.  Report this to the nurse at time of admission.  Do not apply lotions, creams, ointments, deodorants or perfumes after using the clothes. Dress in clean clothes before coming to the hospital.

## 2021-01-23 ENCOUNTER — LAB (OUTPATIENT)
Dept: LAB | Facility: HOSPITAL | Age: 51
End: 2021-01-23

## 2021-01-23 DIAGNOSIS — Z01.818 OTHER SPECIFIED PRE-OPERATIVE EXAMINATION: ICD-10-CM

## 2021-01-23 PROCEDURE — C9803 HOPD COVID-19 SPEC COLLECT: HCPCS

## 2021-01-23 PROCEDURE — U0004 COV-19 TEST NON-CDC HGH THRU: HCPCS

## 2021-01-25 LAB — SARS-COV-2 RNA RESP QL NAA+PROBE: NOT DETECTED

## 2021-01-26 ENCOUNTER — ANESTHESIA EVENT (OUTPATIENT)
Dept: PERIOP | Facility: HOSPITAL | Age: 51
End: 2021-01-26

## 2021-01-26 ENCOUNTER — ANESTHESIA (OUTPATIENT)
Dept: PERIOP | Facility: HOSPITAL | Age: 51
End: 2021-01-26

## 2021-01-26 ENCOUNTER — HOSPITAL ENCOUNTER (OUTPATIENT)
Facility: HOSPITAL | Age: 51
Discharge: HOME | End: 2021-01-28
Attending: SURGERY | Admitting: SURGERY

## 2021-01-26 DIAGNOSIS — K43.2 INCISIONAL HERNIA: ICD-10-CM

## 2021-01-26 PROCEDURE — 25010000002 ONDANSETRON PER 1 MG: Performed by: ANESTHESIOLOGY

## 2021-01-26 PROCEDURE — C9290 INJ, BUPIVACAINE LIPOSOME: HCPCS | Performed by: ANESTHESIOLOGY

## 2021-01-26 PROCEDURE — 25010000002 HYDROMORPHONE PER 4 MG: Performed by: ANESTHESIOLOGY

## 2021-01-26 PROCEDURE — 25010000003 CEFAZOLIN IN DEXTROSE 2-4 GM/100ML-% SOLUTION: Performed by: SURGERY

## 2021-01-26 PROCEDURE — 25010000002 FENTANYL CITRATE (PF) 100 MCG/2ML SOLUTION: Performed by: ANESTHESIOLOGY

## 2021-01-26 PROCEDURE — C1889 IMPLANT/INSERT DEVICE, NOC: HCPCS | Performed by: SURGERY

## 2021-01-26 PROCEDURE — 49568 PR IMPLANT MESH HERNIA REPAIR/DEBRIDEMENT CLOSURE: CPT | Performed by: SPECIALIST/TECHNOLOGIST, OTHER

## 2021-01-26 PROCEDURE — 25010000003 HYDROMORPHONE HCL PF 50 MG/5ML SOLUTION: Performed by: SURGERY

## 2021-01-26 PROCEDURE — 49568 PR IMPLANT MESH HERNIA REPAIR/DEBRIDEMENT CLOSURE: CPT | Performed by: SURGERY

## 2021-01-26 PROCEDURE — 49565 PR REPAIR RECURR INCIS HERNIA,REDUC: CPT | Performed by: SURGERY

## 2021-01-26 PROCEDURE — 49565 PR REPAIR RECURR INCIS HERNIA,REDUC: CPT | Performed by: SPECIALIST/TECHNOLOGIST, OTHER

## 2021-01-26 PROCEDURE — 0: Performed by: SURGERY

## 2021-01-26 PROCEDURE — C1781 MESH (IMPLANTABLE): HCPCS | Performed by: SURGERY

## 2021-01-26 PROCEDURE — S0260 H&P FOR SURGERY: HCPCS | Performed by: SURGERY

## 2021-01-26 PROCEDURE — 25010000003 BUPIVACAINE LIPOSOME 1.3 % SUSPENSION: Performed by: ANESTHESIOLOGY

## 2021-01-26 PROCEDURE — 15734 MUSCLE-SKIN GRAFT TRUNK: CPT | Performed by: SURGERY

## 2021-01-26 PROCEDURE — 25010000002 DEXAMETHASONE PER 1 MG: Performed by: ANESTHESIOLOGY

## 2021-01-26 PROCEDURE — 15734 MUSCLE-SKIN GRAFT TRUNK: CPT | Performed by: SPECIALIST/TECHNOLOGIST, OTHER

## 2021-01-26 PROCEDURE — 25010000002 PROPOFOL 10 MG/ML EMULSION: Performed by: ANESTHESIOLOGY

## 2021-01-26 DEVICE — SEALANT WND FIBRIN TISSEEL PREFIL/SYR/PRIMAFZ 10ML: Type: IMPLANTABLE DEVICE | Site: ABDOMEN | Status: FUNCTIONAL

## 2021-01-26 DEVICE — CLIP LIGAT VASC HORIZON TI LG ORNG 6CT: Type: IMPLANTABLE DEVICE | Site: ABDOMEN | Status: FUNCTIONAL

## 2021-01-26 DEVICE — BARD MESH
Type: IMPLANTABLE DEVICE | Site: ABDOMEN | Status: FUNCTIONAL
Brand: BARD MESH

## 2021-01-26 RX ORDER — CEFAZOLIN SODIUM 2 G/100ML
2 INJECTION, SOLUTION INTRAVENOUS EVERY 8 HOURS
Status: COMPLETED | OUTPATIENT
Start: 2021-01-26 | End: 2021-01-27

## 2021-01-26 RX ORDER — HYDROCODONE BITARTRATE AND ACETAMINOPHEN 7.5; 325 MG/1; MG/1
1 TABLET ORAL ONCE AS NEEDED
Status: DISCONTINUED | OUTPATIENT
Start: 2021-01-26 | End: 2021-01-26 | Stop reason: HOSPADM

## 2021-01-26 RX ORDER — LIDOCAINE HYDROCHLORIDE 20 MG/ML
INJECTION, SOLUTION INFILTRATION; PERINEURAL AS NEEDED
Status: DISCONTINUED | OUTPATIENT
Start: 2021-01-26 | End: 2021-01-26 | Stop reason: SURG

## 2021-01-26 RX ORDER — MIDAZOLAM HYDROCHLORIDE 1 MG/ML
2 INJECTION INTRAMUSCULAR; INTRAVENOUS
Status: DISCONTINUED | OUTPATIENT
Start: 2021-01-26 | End: 2021-01-26 | Stop reason: HOSPADM

## 2021-01-26 RX ORDER — HYDROMORPHONE HYDROCHLORIDE 1 MG/ML
0.5 INJECTION, SOLUTION INTRAMUSCULAR; INTRAVENOUS; SUBCUTANEOUS
Status: DISCONTINUED | OUTPATIENT
Start: 2021-01-26 | End: 2021-01-26 | Stop reason: HOSPADM

## 2021-01-26 RX ORDER — EPHEDRINE SULFATE 50 MG/ML
5 INJECTION, SOLUTION INTRAVENOUS ONCE AS NEEDED
Status: DISCONTINUED | OUTPATIENT
Start: 2021-01-26 | End: 2021-01-26 | Stop reason: HOSPADM

## 2021-01-26 RX ORDER — OXYCODONE AND ACETAMINOPHEN 7.5; 325 MG/1; MG/1
1 TABLET ORAL ONCE AS NEEDED
Status: DISCONTINUED | OUTPATIENT
Start: 2021-01-26 | End: 2021-01-26 | Stop reason: HOSPADM

## 2021-01-26 RX ORDER — BUPIVACAINE HYDROCHLORIDE 2.5 MG/ML
INJECTION, SOLUTION EPIDURAL; INFILTRATION; INTRACAUDAL
Status: COMPLETED | OUTPATIENT
Start: 2021-01-26 | End: 2021-01-26

## 2021-01-26 RX ORDER — CEFAZOLIN SODIUM 2 G/100ML
2 INJECTION, SOLUTION INTRAVENOUS ONCE
Status: COMPLETED | OUTPATIENT
Start: 2021-01-26 | End: 2021-01-26

## 2021-01-26 RX ORDER — PANTOPRAZOLE SODIUM 40 MG/1
40 TABLET, DELAYED RELEASE ORAL EVERY MORNING
Status: DISCONTINUED | OUTPATIENT
Start: 2021-01-27 | End: 2021-01-28 | Stop reason: HOSPADM

## 2021-01-26 RX ORDER — HYDRALAZINE HYDROCHLORIDE 20 MG/ML
5 INJECTION INTRAMUSCULAR; INTRAVENOUS
Status: DISCONTINUED | OUTPATIENT
Start: 2021-01-26 | End: 2021-01-26 | Stop reason: HOSPADM

## 2021-01-26 RX ORDER — SODIUM CHLORIDE 9 MG/ML
75 INJECTION, SOLUTION INTRAVENOUS CONTINUOUS
Status: DISCONTINUED | OUTPATIENT
Start: 2021-01-26 | End: 2021-01-28

## 2021-01-26 RX ORDER — SODIUM CHLORIDE 0.9 % (FLUSH) 0.9 %
3-10 SYRINGE (ML) INJECTION AS NEEDED
Status: DISCONTINUED | OUTPATIENT
Start: 2021-01-26 | End: 2021-01-26 | Stop reason: HOSPADM

## 2021-01-26 RX ORDER — SCOLOPAMINE TRANSDERMAL SYSTEM 1 MG/1
1 PATCH, EXTENDED RELEASE TRANSDERMAL ONCE
Status: DISCONTINUED | OUTPATIENT
Start: 2021-01-26 | End: 2021-01-26

## 2021-01-26 RX ORDER — ONDANSETRON 2 MG/ML
INJECTION INTRAMUSCULAR; INTRAVENOUS AS NEEDED
Status: DISCONTINUED | OUTPATIENT
Start: 2021-01-26 | End: 2021-01-26 | Stop reason: SURG

## 2021-01-26 RX ORDER — LABETALOL HYDROCHLORIDE 5 MG/ML
5 INJECTION, SOLUTION INTRAVENOUS
Status: DISCONTINUED | OUTPATIENT
Start: 2021-01-26 | End: 2021-01-26 | Stop reason: HOSPADM

## 2021-01-26 RX ORDER — FLUMAZENIL 0.1 MG/ML
0.2 INJECTION INTRAVENOUS AS NEEDED
Status: DISCONTINUED | OUTPATIENT
Start: 2021-01-26 | End: 2021-01-26 | Stop reason: HOSPADM

## 2021-01-26 RX ORDER — FAMOTIDINE 10 MG/ML
20 INJECTION, SOLUTION INTRAVENOUS ONCE
Status: COMPLETED | OUTPATIENT
Start: 2021-01-26 | End: 2021-01-26

## 2021-01-26 RX ORDER — PROMETHAZINE HYDROCHLORIDE 25 MG/1
25 SUPPOSITORY RECTAL ONCE AS NEEDED
Status: DISCONTINUED | OUTPATIENT
Start: 2021-01-26 | End: 2021-01-26 | Stop reason: HOSPADM

## 2021-01-26 RX ORDER — ROCURONIUM BROMIDE 10 MG/ML
INJECTION, SOLUTION INTRAVENOUS AS NEEDED
Status: DISCONTINUED | OUTPATIENT
Start: 2021-01-26 | End: 2021-01-26 | Stop reason: SURG

## 2021-01-26 RX ORDER — HYDROMORPHONE HCL 110MG/55ML
PATIENT CONTROLLED ANALGESIA SYRINGE INTRAVENOUS AS NEEDED
Status: DISCONTINUED | OUTPATIENT
Start: 2021-01-26 | End: 2021-01-26 | Stop reason: SURG

## 2021-01-26 RX ORDER — DIPHENHYDRAMINE HCL 25 MG
25 CAPSULE ORAL
Status: DISCONTINUED | OUTPATIENT
Start: 2021-01-26 | End: 2021-01-26 | Stop reason: HOSPADM

## 2021-01-26 RX ORDER — NALOXONE HCL 0.4 MG/ML
0.1 VIAL (ML) INJECTION
Status: DISCONTINUED | OUTPATIENT
Start: 2021-01-26 | End: 2021-01-28 | Stop reason: HOSPADM

## 2021-01-26 RX ORDER — SODIUM CHLORIDE 0.9 % (FLUSH) 0.9 %
3 SYRINGE (ML) INJECTION EVERY 12 HOURS SCHEDULED
Status: DISCONTINUED | OUTPATIENT
Start: 2021-01-26 | End: 2021-01-26 | Stop reason: HOSPADM

## 2021-01-26 RX ORDER — SODIUM CHLORIDE, SODIUM LACTATE, POTASSIUM CHLORIDE, CALCIUM CHLORIDE 600; 310; 30; 20 MG/100ML; MG/100ML; MG/100ML; MG/100ML
9 INJECTION, SOLUTION INTRAVENOUS CONTINUOUS
Status: DISCONTINUED | OUTPATIENT
Start: 2021-01-26 | End: 2021-01-26

## 2021-01-26 RX ORDER — FENTANYL CITRATE 50 UG/ML
50 INJECTION, SOLUTION INTRAMUSCULAR; INTRAVENOUS
Status: DISCONTINUED | OUTPATIENT
Start: 2021-01-26 | End: 2021-01-26 | Stop reason: HOSPADM

## 2021-01-26 RX ORDER — NALOXONE HCL 0.4 MG/ML
0.2 VIAL (ML) INJECTION AS NEEDED
Status: DISCONTINUED | OUTPATIENT
Start: 2021-01-26 | End: 2021-01-26 | Stop reason: HOSPADM

## 2021-01-26 RX ORDER — PROMETHAZINE HYDROCHLORIDE 25 MG/1
25 TABLET ORAL ONCE AS NEEDED
Status: DISCONTINUED | OUTPATIENT
Start: 2021-01-26 | End: 2021-01-26 | Stop reason: HOSPADM

## 2021-01-26 RX ORDER — HYDROMORPHONE HCL IN 0.9% NACL 10 MG/50ML
PATIENT CONTROLLED ANALGESIA SYRINGE INTRAVENOUS CONTINUOUS
Status: DISCONTINUED | OUTPATIENT
Start: 2021-01-26 | End: 2021-01-27

## 2021-01-26 RX ORDER — ONDANSETRON 2 MG/ML
4 INJECTION INTRAMUSCULAR; INTRAVENOUS ONCE AS NEEDED
Status: COMPLETED | OUTPATIENT
Start: 2021-01-26 | End: 2021-01-26

## 2021-01-26 RX ORDER — LIDOCAINE HYDROCHLORIDE 10 MG/ML
0.5 INJECTION, SOLUTION EPIDURAL; INFILTRATION; INTRACAUDAL; PERINEURAL ONCE AS NEEDED
Status: DISCONTINUED | OUTPATIENT
Start: 2021-01-26 | End: 2021-01-26 | Stop reason: HOSPADM

## 2021-01-26 RX ORDER — PROPOFOL 10 MG/ML
VIAL (ML) INTRAVENOUS AS NEEDED
Status: DISCONTINUED | OUTPATIENT
Start: 2021-01-26 | End: 2021-01-26 | Stop reason: SURG

## 2021-01-26 RX ORDER — MIDAZOLAM HYDROCHLORIDE 1 MG/ML
1 INJECTION INTRAMUSCULAR; INTRAVENOUS
Status: DISCONTINUED | OUTPATIENT
Start: 2021-01-26 | End: 2021-01-26 | Stop reason: HOSPADM

## 2021-01-26 RX ORDER — DEXAMETHASONE SODIUM PHOSPHATE 10 MG/ML
INJECTION INTRAMUSCULAR; INTRAVENOUS AS NEEDED
Status: DISCONTINUED | OUTPATIENT
Start: 2021-01-26 | End: 2021-01-26 | Stop reason: SURG

## 2021-01-26 RX ORDER — MAGNESIUM HYDROXIDE 1200 MG/15ML
LIQUID ORAL AS NEEDED
Status: DISCONTINUED | OUTPATIENT
Start: 2021-01-26 | End: 2021-01-26 | Stop reason: HOSPADM

## 2021-01-26 RX ORDER — DIPHENHYDRAMINE HYDROCHLORIDE 50 MG/ML
12.5 INJECTION INTRAMUSCULAR; INTRAVENOUS
Status: DISCONTINUED | OUTPATIENT
Start: 2021-01-26 | End: 2021-01-26 | Stop reason: HOSPADM

## 2021-01-26 RX ADMIN — HYDROMORPHONE HYDROCHLORIDE 0.5 MG: 2 INJECTION, SOLUTION INTRAMUSCULAR; INTRAVENOUS; SUBCUTANEOUS at 09:34

## 2021-01-26 RX ADMIN — ROCURONIUM BROMIDE 30 MG: 10 INJECTION INTRAVENOUS at 10:25

## 2021-01-26 RX ADMIN — ROCURONIUM BROMIDE 50 MG: 10 INJECTION INTRAVENOUS at 09:41

## 2021-01-26 RX ADMIN — ONDANSETRON 4 MG: 2 INJECTION INTRAMUSCULAR; INTRAVENOUS at 14:18

## 2021-01-26 RX ADMIN — CEFAZOLIN SODIUM 2 G: 2 INJECTION, SOLUTION INTRAVENOUS at 11:46

## 2021-01-26 RX ADMIN — BUPIVACAINE HYDROCHLORIDE 40 ML: 2.5 INJECTION, SOLUTION EPIDURAL; INFILTRATION; INTRACAUDAL; PERINEURAL at 12:31

## 2021-01-26 RX ADMIN — SCOPALAMINE 1 PATCH: 1 PATCH, EXTENDED RELEASE TRANSDERMAL at 08:33

## 2021-01-26 RX ADMIN — DEXAMETHASONE SODIUM PHOSPHATE 8 MG: 10 INJECTION INTRAMUSCULAR; INTRAVENOUS at 09:42

## 2021-01-26 RX ADMIN — FENTANYL CITRATE 50 MCG: 50 INJECTION, SOLUTION INTRAMUSCULAR; INTRAVENOUS at 13:29

## 2021-01-26 RX ADMIN — SODIUM CHLORIDE 125 ML/HR: 9 INJECTION, SOLUTION INTRAVENOUS at 14:22

## 2021-01-26 RX ADMIN — ONDANSETRON HYDROCHLORIDE 4 MG: 2 SOLUTION INTRAMUSCULAR; INTRAVENOUS at 12:22

## 2021-01-26 RX ADMIN — HYDROMORPHONE HYDROCHLORIDE 0.5 MG: 1 INJECTION, SOLUTION INTRAMUSCULAR; INTRAVENOUS; SUBCUTANEOUS at 14:16

## 2021-01-26 RX ADMIN — BUPIVACAINE 20 ML: 13.3 INJECTION, SUSPENSION, LIPOSOMAL INFILTRATION at 12:31

## 2021-01-26 RX ADMIN — CEFAZOLIN SODIUM 2 G: 2 INJECTION, SOLUTION INTRAVENOUS at 18:07

## 2021-01-26 RX ADMIN — SODIUM CHLORIDE, POTASSIUM CHLORIDE, SODIUM LACTATE AND CALCIUM CHLORIDE 9 ML/HR: 600; 310; 30; 20 INJECTION, SOLUTION INTRAVENOUS at 07:58

## 2021-01-26 RX ADMIN — ROCURONIUM BROMIDE 20 MG: 10 INJECTION INTRAVENOUS at 10:54

## 2021-01-26 RX ADMIN — HYDROMORPHONE HYDROCHLORIDE: 10 INJECTION, SOLUTION INTRAMUSCULAR; INTRAVENOUS; SUBCUTANEOUS at 13:42

## 2021-01-26 RX ADMIN — HYDROMORPHONE HYDROCHLORIDE 0.5 MG: 2 INJECTION, SOLUTION INTRAMUSCULAR; INTRAVENOUS; SUBCUTANEOUS at 10:06

## 2021-01-26 RX ADMIN — HYDROMORPHONE HYDROCHLORIDE 0.5 MG: 2 INJECTION, SOLUTION INTRAMUSCULAR; INTRAVENOUS; SUBCUTANEOUS at 10:56

## 2021-01-26 RX ADMIN — LIDOCAINE HYDROCHLORIDE 100 MG: 20 INJECTION, SOLUTION INFILTRATION; PERINEURAL at 09:39

## 2021-01-26 RX ADMIN — PROPOFOL 200 MG: 10 INJECTION, EMULSION INTRAVENOUS at 09:39

## 2021-01-26 RX ADMIN — CEFAZOLIN SODIUM 2 G: 2 INJECTION, SOLUTION INTRAVENOUS at 09:47

## 2021-01-26 RX ADMIN — HYDROMORPHONE HYDROCHLORIDE 0.5 MG: 1 INJECTION, SOLUTION INTRAMUSCULAR; INTRAVENOUS; SUBCUTANEOUS at 13:17

## 2021-01-26 RX ADMIN — FAMOTIDINE 20 MG: 10 INJECTION INTRAVENOUS at 08:33

## 2021-01-26 RX ADMIN — FENTANYL CITRATE 50 MCG: 50 INJECTION, SOLUTION INTRAMUSCULAR; INTRAVENOUS at 13:04

## 2021-01-26 RX ADMIN — PROPOFOL 75 MCG/KG/MIN: 10 INJECTION, EMULSION INTRAVENOUS at 10:41

## 2021-01-26 NOTE — ANESTHESIA PREPROCEDURE EVALUATION
Anesthesia Evaluation     history of anesthetic complications: PONV               Airway   Mallampati: II  TM distance: >3 FB  Neck ROM: full  No difficulty expected  Dental - normal exam     Pulmonary - normal exam   (+) asthma,sleep apnea,   Cardiovascular - normal exam        Neuro/Psych  GI/Hepatic/Renal/Endo    (+)  GERD,  hepatitis, liver disease,     Musculoskeletal     Abdominal    Substance History      OB/GYN          Other                        Anesthesia Plan    ASA 2     general     intravenous induction     Anesthetic plan, all risks, benefits, and alternatives have been provided, discussed and informed consent has been obtained with: patient.

## 2021-01-26 NOTE — ANESTHESIA POSTPROCEDURE EVALUATION
Patient: Margarito Miles    Procedure Summary     Date: 01/26/21 Room / Location: [facility] OR 06 /  [facility]    Anesthesia Start: 0932 Anesthesia Stop: 1257    Procedure: OPEN RECURRENT INCISIONAL HERNIA REPAIR WITH MESH (N/A Abdomen) Diagnosis:       Incisional hernia      (Incisional hernia [K43.2])    Surgeon: Troy Guardado MD Provider: Daryl Hilliard DO    Anesthesia Type: general ASA Status: 2          Anesthesia Type: general    Vitals  Vitals Value Taken Time   /92 01/26/21 1350   Temp 36.6 °C (97.8 °F) 01/26/21 1254   Pulse 97 01/26/21 1402   Resp 16 01/26/21 1320   SpO2 99 % 01/26/21 1402   Vitals shown include unvalidated device data.        Post Anesthesia Care and Evaluation    Patient location during evaluation: PACU  Anesthetic complications: No anesthetic complications

## 2021-01-26 NOTE — ANESTHESIA PROCEDURE NOTES
Airway  Urgency: elective    Date/Time: 1/26/2021 9:43 AM  End Time:1/26/2021 9:44 AM  Airway not difficult    General Information and Staff    Patient location during procedure: OR  Anesthesiologist: Daryl Hilliard DO    Indications and Patient Condition  Indications for airway management: airway protection    Preoxygenated: yes  MILS maintained throughout  Mask difficulty assessment: 2 - vent by mask + OA or adjuvant +/- NMBA    Final Airway Details  Final airway type: endotracheal airway      Successful airway: ETT  Cuffed: yes   Successful intubation technique: direct laryngoscopy  Facilitating devices/methods: intubating stylet  Endotracheal tube insertion site: oral  Blade: Shaun  Blade size: 3  ETT size (mm): 7.5  Cormack-Lehane Classification: grade I - full view of glottis  Placement verified by: chest auscultation and capnometry   Cuff volume (mL): 8  Measured from: teeth  ETT/EBT  to teeth (cm): 22  Number of attempts at approach: 1  Assessment: lips, teeth, and gum same as pre-op and atraumatic intubation

## 2021-01-26 NOTE — ANESTHESIA PROCEDURE NOTES
Peripheral Block    Pre-sedation assessment completed: 1/26/2021 12:23 PM    Patient reassessed immediately prior to procedure    Patient location during procedure: OR  Start time: 1/26/2021 12:25 PM  Stop time: 1/26/2021 12:32 PM  Reason for block: at surgeon's request and post-op pain management  Performed by  Anesthesiologist: Daryl Hilliard DO  Preanesthetic Checklist  Completed: patient identified, site marked, surgical consent, pre-op evaluation, timeout performed, IV checked, risks and benefits discussed and monitors and equipment checked  Prep:  Pt Position: supine  Sterile barriers:gloves, mask, cap, partial drape and washed/disinfected hands  Prep: ChloraPrep  Patient monitoring: blood pressure monitoring, continuous pulse oximetry and EKG  Procedure  Sedation:yes  Performed under: general  Guidance:ultrasound guided  Images:still images obtained, printed/placed on chart    Laterality:Bilateral  Block Type:TAP  Injection Technique:single-shot  Needle Type:echogenic  Needle Gauge:22 G  Resistance on Injection: none    Medications Used: bupivacaine liposome (EXPAREL) 1.3 % injection, 20 mL  bupivacaine PF (MARCAINE) 0.25 % injection, 40 mL  Med admintered at 1/26/2021 12:31 PM      Medications  Comment:Subcostal TAP performed.    Ultrasound Interpretation:  Using ultrasound guidance the needle was placed in close proximity to the transverse abdominal plane and hydrodisection with NS was used to establish successful access. Anesthetic was then injected into the plane and spread of the anesthetic was seen on ultrasound in close proximity thereto.  There were no abnormalities seen on ultrasound; a digital image was taken; and the patient tolerated the procedure with no complications.      Post Assessment  Injection Assessment: negative aspiration for heme, no paresthesia on injection and incremental injection  Patient Tolerance:comfortable throughout block  Complications:no

## 2021-01-27 LAB
ANION GAP SERPL CALCULATED.3IONS-SCNC: 9.6 MMOL/L (ref 5–15)
BASOPHILS # BLD AUTO: 0.04 10*3/MM3 (ref 0–0.2)
BASOPHILS NFR BLD AUTO: 0.4 % (ref 0–1.5)
BUN SERPL-MCNC: 11 MG/DL (ref 6–20)
BUN/CREAT SERPL: 9.9 (ref 7–25)
CALCIUM SPEC-SCNC: 8.7 MG/DL (ref 8.6–10.5)
CHLORIDE SERPL-SCNC: 106 MMOL/L (ref 98–107)
CO2 SERPL-SCNC: 28.4 MMOL/L (ref 22–29)
CREAT SERPL-MCNC: 1.11 MG/DL (ref 0.76–1.27)
DEPRECATED RDW RBC AUTO: 41.3 FL (ref 37–54)
EOSINOPHIL # BLD AUTO: 0.15 10*3/MM3 (ref 0–0.4)
EOSINOPHIL NFR BLD AUTO: 1.4 % (ref 0.3–6.2)
ERYTHROCYTE [DISTWIDTH] IN BLOOD BY AUTOMATED COUNT: 13.4 % (ref 12.3–15.4)
GFR SERPL CREATININE-BSD FRML MDRD: 71 ML/MIN/1.73
GLUCOSE SERPL-MCNC: 93 MG/DL (ref 65–99)
HCT VFR BLD AUTO: 40.1 % (ref 37.5–51)
HGB BLD-MCNC: 13.5 G/DL (ref 13–17.7)
IMM GRANULOCYTES # BLD AUTO: 0.04 10*3/MM3 (ref 0–0.05)
IMM GRANULOCYTES NFR BLD AUTO: 0.4 % (ref 0–0.5)
LYMPHOCYTES # BLD AUTO: 1.08 10*3/MM3 (ref 0.7–3.1)
LYMPHOCYTES NFR BLD AUTO: 9.9 % (ref 19.6–45.3)
MCH RBC QN AUTO: 28.6 PG (ref 26.6–33)
MCHC RBC AUTO-ENTMCNC: 33.7 G/DL (ref 31.5–35.7)
MCV RBC AUTO: 85 FL (ref 79–97)
MONOCYTES # BLD AUTO: 0.98 10*3/MM3 (ref 0.1–0.9)
MONOCYTES NFR BLD AUTO: 9 % (ref 5–12)
NEUTROPHILS NFR BLD AUTO: 78.9 % (ref 42.7–76)
NEUTROPHILS NFR BLD AUTO: 8.59 10*3/MM3 (ref 1.7–7)
NRBC BLD AUTO-RTO: 0 /100 WBC (ref 0–0.2)
PLATELET # BLD AUTO: 196 10*3/MM3 (ref 140–450)
PMV BLD AUTO: 10.5 FL (ref 6–12)
POTASSIUM SERPL-SCNC: 3.9 MMOL/L (ref 3.5–5.2)
RBC # BLD AUTO: 4.72 10*6/MM3 (ref 4.14–5.8)
SODIUM SERPL-SCNC: 144 MMOL/L (ref 136–145)
WBC # BLD AUTO: 10.88 10*3/MM3 (ref 3.4–10.8)

## 2021-01-27 PROCEDURE — 25010000002 KETOROLAC TROMETHAMINE PER 15 MG: Performed by: SURGERY

## 2021-01-27 PROCEDURE — 25010000002 ENOXAPARIN PER 10 MG: Performed by: SURGERY

## 2021-01-27 PROCEDURE — 25010000003 CEFAZOLIN IN DEXTROSE 2-4 GM/100ML-% SOLUTION: Performed by: SURGERY

## 2021-01-27 PROCEDURE — 85025 COMPLETE CBC W/AUTO DIFF WBC: CPT | Performed by: SURGERY

## 2021-01-27 PROCEDURE — 99024 POSTOP FOLLOW-UP VISIT: CPT | Performed by: SURGERY

## 2021-01-27 PROCEDURE — 25010000002 ONDANSETRON PER 1 MG: Performed by: SURGERY

## 2021-01-27 PROCEDURE — 80048 BASIC METABOLIC PNL TOTAL CA: CPT | Performed by: SURGERY

## 2021-01-27 RX ORDER — HYDROMORPHONE HCL IN 0.9% NACL 10 MG/50ML
PATIENT CONTROLLED ANALGESIA SYRINGE INTRAVENOUS CONTINUOUS
Status: DISCONTINUED | OUTPATIENT
Start: 2021-01-27 | End: 2021-01-28

## 2021-01-27 RX ORDER — KETOROLAC TROMETHAMINE 30 MG/ML
15 INJECTION, SOLUTION INTRAMUSCULAR; INTRAVENOUS EVERY 6 HOURS
Status: DISCONTINUED | OUTPATIENT
Start: 2021-01-27 | End: 2021-01-28 | Stop reason: HOSPADM

## 2021-01-27 RX ORDER — ONDANSETRON 2 MG/ML
4 INJECTION INTRAMUSCULAR; INTRAVENOUS EVERY 6 HOURS PRN
Status: DISCONTINUED | OUTPATIENT
Start: 2021-01-27 | End: 2021-01-28 | Stop reason: HOSPADM

## 2021-01-27 RX ADMIN — ENOXAPARIN SODIUM 40 MG: 40 INJECTION SUBCUTANEOUS at 08:55

## 2021-01-27 RX ADMIN — ONDANSETRON 4 MG: 2 INJECTION INTRAMUSCULAR; INTRAVENOUS at 21:23

## 2021-01-27 RX ADMIN — KETOROLAC TROMETHAMINE 15 MG: 30 INJECTION, SOLUTION INTRAMUSCULAR at 09:10

## 2021-01-27 RX ADMIN — SODIUM CHLORIDE 125 ML/HR: 9 INJECTION, SOLUTION INTRAVENOUS at 00:06

## 2021-01-27 RX ADMIN — ONDANSETRON 4 MG: 2 INJECTION INTRAMUSCULAR; INTRAVENOUS at 07:17

## 2021-01-27 RX ADMIN — CEFAZOLIN SODIUM 2 G: 2 INJECTION, SOLUTION INTRAVENOUS at 03:21

## 2021-01-27 RX ADMIN — PANTOPRAZOLE SODIUM 40 MG: 40 TABLET, DELAYED RELEASE ORAL at 06:03

## 2021-01-27 RX ADMIN — ONDANSETRON 4 MG: 2 INJECTION INTRAMUSCULAR; INTRAVENOUS at 00:05

## 2021-01-27 RX ADMIN — SODIUM CHLORIDE 75 ML/HR: 9 INJECTION, SOLUTION INTRAVENOUS at 12:30

## 2021-01-27 RX ADMIN — KETOROLAC TROMETHAMINE 15 MG: 30 INJECTION, SOLUTION INTRAMUSCULAR at 21:23

## 2021-01-27 RX ADMIN — KETOROLAC TROMETHAMINE 15 MG: 30 INJECTION, SOLUTION INTRAMUSCULAR at 16:49

## 2021-01-28 VITALS
HEART RATE: 88 BPM | BODY MASS INDEX: 29.62 KG/M2 | OXYGEN SATURATION: 98 % | HEIGHT: 72 IN | SYSTOLIC BLOOD PRESSURE: 125 MMHG | TEMPERATURE: 98.7 F | WEIGHT: 218.7 LBS | DIASTOLIC BLOOD PRESSURE: 80 MMHG | RESPIRATION RATE: 16 BRPM

## 2021-01-28 PROCEDURE — 25010000002 KETOROLAC TROMETHAMINE PER 15 MG: Performed by: SURGERY

## 2021-01-28 PROCEDURE — 25010000002 ENOXAPARIN PER 10 MG: Performed by: SURGERY

## 2021-01-28 RX ORDER — ONDANSETRON 4 MG/1
4 TABLET, FILM COATED ORAL EVERY 6 HOURS PRN
Qty: 10 TABLET | Refills: 1 | Status: SHIPPED | OUTPATIENT
Start: 2021-01-28 | End: 2021-02-04

## 2021-01-28 RX ORDER — HYDROCODONE BITARTRATE AND ACETAMINOPHEN 5; 325 MG/1; MG/1
1 TABLET ORAL EVERY 4 HOURS PRN
Status: DISCONTINUED | OUTPATIENT
Start: 2021-01-28 | End: 2021-01-28 | Stop reason: HOSPADM

## 2021-01-28 RX ORDER — HYDROCODONE BITARTRATE AND ACETAMINOPHEN 5; 325 MG/1; MG/1
1-2 TABLET ORAL EVERY 4 HOURS PRN
Qty: 24 TABLET | Refills: 0 | Status: SHIPPED | OUTPATIENT
Start: 2021-01-28 | End: 2021-02-04

## 2021-01-28 RX ORDER — HYDROMORPHONE HYDROCHLORIDE 1 MG/ML
0.5 INJECTION, SOLUTION INTRAMUSCULAR; INTRAVENOUS; SUBCUTANEOUS
Status: DISCONTINUED | OUTPATIENT
Start: 2021-01-28 | End: 2021-01-28 | Stop reason: HOSPADM

## 2021-01-28 RX ORDER — HYDROCODONE BITARTRATE AND ACETAMINOPHEN 5; 325 MG/1; MG/1
2 TABLET ORAL EVERY 4 HOURS PRN
Status: DISCONTINUED | OUTPATIENT
Start: 2021-01-28 | End: 2021-01-28 | Stop reason: HOSPADM

## 2021-01-28 RX ADMIN — KETOROLAC TROMETHAMINE 15 MG: 30 INJECTION, SOLUTION INTRAMUSCULAR at 04:56

## 2021-01-28 RX ADMIN — ENOXAPARIN SODIUM 40 MG: 40 INJECTION SUBCUTANEOUS at 09:03

## 2021-01-28 RX ADMIN — SODIUM CHLORIDE 75 ML/HR: 9 INJECTION, SOLUTION INTRAVENOUS at 01:52

## 2021-01-28 RX ADMIN — PANTOPRAZOLE SODIUM 40 MG: 40 TABLET, DELAYED RELEASE ORAL at 06:32

## 2021-01-28 RX ADMIN — KETOROLAC TROMETHAMINE 15 MG: 30 INJECTION, SOLUTION INTRAMUSCULAR at 09:03

## 2021-02-04 ENCOUNTER — CLINICAL SUPPORT (OUTPATIENT)
Dept: SURGERY | Facility: CLINIC | Age: 51
End: 2021-02-04

## 2021-02-04 VITALS — HEIGHT: 72 IN | BODY MASS INDEX: 29.8 KG/M2 | WEIGHT: 220 LBS

## 2021-02-04 NOTE — PROGRESS NOTES
Drain totals 1) 7.5 ml 2) 10ml for the last 24 hours, Patient states that 5 ml approximately in each drain the 24 hours prior. Every other staple removed, mastisol and steri-strips applied. Drains removed without difficulty antibiotic ointment, guaze, and tegaderm applied. Patient educated on care for  Incision care and will be scheduled for staple removal x 1 week with MA. Patient is a nurse and voiced understanding.

## 2021-02-11 ENCOUNTER — CLINICAL SUPPORT (OUTPATIENT)
Dept: SURGERY | Facility: CLINIC | Age: 51
End: 2021-02-11

## 2021-02-11 VITALS — HEIGHT: 72 IN | WEIGHT: 222 LBS | BODY MASS INDEX: 30.07 KG/M2

## 2021-02-25 ENCOUNTER — OFFICE VISIT (OUTPATIENT)
Dept: SURGERY | Facility: CLINIC | Age: 51
End: 2021-02-25

## 2021-02-25 DIAGNOSIS — Z09 FOLLOW UP: Primary | ICD-10-CM

## 2021-02-25 PROCEDURE — 99024 POSTOP FOLLOW-UP VISIT: CPT | Performed by: SURGERY

## 2021-03-01 NOTE — PROGRESS NOTES
Postoperative visit    Open recurrent incisional hernia repair with mesh and bilateral component separation 1/26/2021    Office visit: Drains were removed 1 week after surgery.  Incision has healed well.  There is no evidence of seroma.  There is no evidence of infection.  There is no evidence of recurrent hernia or other problem.  Activity restrictions discussed.

## 2021-03-30 ENCOUNTER — TELEPHONE (OUTPATIENT)
Dept: GASTROENTEROLOGY | Facility: CLINIC | Age: 51
End: 2021-03-30

## 2021-03-30 NOTE — TELEPHONE ENCOUNTER
Pt. Called and informed about overdue MRI. Patient states this is over $700 dollars, and had a previous scan done a few months prior. Spoke to GP and was told not to have this done at this time. He would like to have this order canceled. Patient informed to call the office if symptoms still persist to reorder.

## 2021-04-15 ENCOUNTER — PREP FOR SURGERY (OUTPATIENT)
Dept: SURGERY | Facility: SURGERY CENTER | Age: 51
End: 2021-04-15

## 2021-04-15 DIAGNOSIS — Z12.11 ENCOUNTER FOR SCREENING FOR MALIGNANT NEOPLASM OF COLON: ICD-10-CM

## 2021-04-15 DIAGNOSIS — K50.019 CROHN'S DISEASE OF ILEUM WITH COMPLICATION (HCC): Primary | ICD-10-CM

## 2021-04-15 RX ORDER — SODIUM CHLORIDE 0.9 % (FLUSH) 0.9 %
10 SYRINGE (ML) INJECTION AS NEEDED
Status: CANCELLED | OUTPATIENT
Start: 2021-04-15

## 2021-04-15 RX ORDER — SODIUM CHLORIDE 0.9 % (FLUSH) 0.9 %
3 SYRINGE (ML) INJECTION EVERY 12 HOURS SCHEDULED
Status: CANCELLED | OUTPATIENT
Start: 2021-04-15

## 2021-04-15 RX ORDER — SODIUM CHLORIDE, SODIUM LACTATE, POTASSIUM CHLORIDE, CALCIUM CHLORIDE 600; 310; 30; 20 MG/100ML; MG/100ML; MG/100ML; MG/100ML
30 INJECTION, SOLUTION INTRAVENOUS CONTINUOUS PRN
Status: CANCELLED | OUTPATIENT
Start: 2021-04-15

## 2021-05-26 ENCOUNTER — OFFICE VISIT (OUTPATIENT)
Dept: GASTROENTEROLOGY | Facility: CLINIC | Age: 51
End: 2021-05-26

## 2021-05-26 ENCOUNTER — PREP FOR SURGERY (OUTPATIENT)
Dept: SURGERY | Facility: SURGERY CENTER | Age: 51
End: 2021-05-26

## 2021-05-26 VITALS
BODY MASS INDEX: 30.46 KG/M2 | HEIGHT: 72 IN | SYSTOLIC BLOOD PRESSURE: 130 MMHG | HEART RATE: 79 BPM | OXYGEN SATURATION: 99 % | TEMPERATURE: 97.3 F | WEIGHT: 224.9 LBS | DIASTOLIC BLOOD PRESSURE: 90 MMHG

## 2021-05-26 DIAGNOSIS — K50.919 CROHN'S DISEASE WITH COMPLICATION, UNSPECIFIED GASTROINTESTINAL TRACT LOCATION (HCC): Primary | ICD-10-CM

## 2021-05-26 DIAGNOSIS — Z80.0 FAMILY HISTORY OF COLON CANCER: ICD-10-CM

## 2021-05-26 DIAGNOSIS — K75.81 NASH (NONALCOHOLIC STEATOHEPATITIS): ICD-10-CM

## 2021-05-26 DIAGNOSIS — K50.012 CROHN'S DISEASE OF SMALL INTESTINE WITH INTESTINAL OBSTRUCTION (HCC): ICD-10-CM

## 2021-05-26 DIAGNOSIS — D12.6 ADENOMATOUS POLYP OF COLON, UNSPECIFIED PART OF COLON: ICD-10-CM

## 2021-05-26 DIAGNOSIS — Z79.899 HIGH RISK MEDICATION USE: ICD-10-CM

## 2021-05-26 DIAGNOSIS — K21.9 GASTROESOPHAGEAL REFLUX DISEASE, UNSPECIFIED WHETHER ESOPHAGITIS PRESENT: ICD-10-CM

## 2021-05-26 DIAGNOSIS — K50.00 CROHN'S DISEASE OF SMALL INTESTINE WITHOUT COMPLICATION (HCC): Primary | ICD-10-CM

## 2021-05-26 PROBLEM — K50.019 CROHN'S DISEASE OF ILEUM WITH COMPLICATION: Status: ACTIVE | Noted: 2021-05-26

## 2021-05-26 PROBLEM — Z12.11 ENCOUNTER FOR SCREENING FOR MALIGNANT NEOPLASM OF COLON: Status: ACTIVE | Noted: 2021-05-26

## 2021-05-26 PROCEDURE — 99214 OFFICE O/P EST MOD 30 MIN: CPT | Performed by: INTERNAL MEDICINE

## 2021-05-26 RX ORDER — SODIUM CHLORIDE 0.9 % (FLUSH) 0.9 %
3 SYRINGE (ML) INJECTION EVERY 12 HOURS SCHEDULED
Status: CANCELLED | OUTPATIENT
Start: 2021-05-26

## 2021-05-26 RX ORDER — SODIUM CHLORIDE 0.9 % (FLUSH) 0.9 %
10 SYRINGE (ML) INJECTION AS NEEDED
Status: CANCELLED | OUTPATIENT
Start: 2021-05-26

## 2021-05-26 RX ORDER — SODIUM CHLORIDE, SODIUM LACTATE, POTASSIUM CHLORIDE, CALCIUM CHLORIDE 600; 310; 30; 20 MG/100ML; MG/100ML; MG/100ML; MG/100ML
30 INJECTION, SOLUTION INTRAVENOUS CONTINUOUS PRN
Status: CANCELLED | OUTPATIENT
Start: 2021-05-26

## 2021-05-26 NOTE — PROGRESS NOTES
No chief complaint on file.          History of Present Illness  50-year-old gentleman here to follow-up for his Crohn's disease.  He is status post incisional hernia repair with mesh and bilateral component separation 1/26/2021.  This is following a ileocolic resection for recurrent Crohn's disease with a seroma recurrent.    He is doing very well currently.  He did have the hernia repaired and wear the binder for 14 weeks.  He has acid reflux that is controlled with twice daily dosing and has symptoms if he misses a dose.  His bowel movements are doing well he is taking his Stelara and overall feels that he is doing better he is just fatigued postoperatively and gaining his energy back 1 day at a time  Review of Systems     Result Review :       Ustekinumab (STELARA) 90 MG/ML solution prefilled syringe Injection (07/23/2020)  Milk Thistle 200 MG capsule (06/01/2020)  esomeprazole (nexIUM) 40 MG capsule  Surgery with Troy Guardado MD (01/26/2021)      Vital Signs:   There were no vitals taken for this visit.    There is no height or weight on file to calculate BMI.     Physical Exam  Vitals reviewed.   Constitutional:       Appearance: Normal appearance.   Abdominal:      General: Bowel sounds are normal. There is no distension.      Palpations: Abdomen is soft. Abdomen is not rigid. There is no mass or pulsatile mass.      Tenderness: There is no abdominal tenderness. There is no guarding or rebound.      Comments: Nontender but multiple surgical scars and some firmness from previous surgical sites           Assessment and Plan    Diagnoses and all orders for this visit:    1. Crohn's disease of small intestine without complication (CMS/HCC) (Primary)    2. High risk medication use    3. MACDONALD (nonalcoholic steatohepatitis)    4. Crohn's disease of small intestine with intestinal obstruction (CMS/HCC)    5. Adenomatous polyp of colon, unspecified part of colon    6. Family history of colon cancer    7.  Gastroesophageal reflux disease, unspecified whether esophagitis present         Plan  1.  Continue Stelara every 4 weeks  2.  Full set of blood work today including CBC CMP vitamin D and vitamin B12  3.  Schedule EGD and colonoscopy  4.  Continue PPI but attempt to wean down  5.  Schedule MR to follow-up on liver lesion  6.  Continue B12 weekly offered nasal spray but cost prohibitive  7.  Health maintenance readdress    I have reviewed and confirmed the accuracy of the HPI and Assessment and Plan as documented by the APRN Jesús Thompson MD        Follow Up   No follow-ups on file.    There are no Patient Instructions on file for this visit.

## 2021-05-27 LAB
25(OH)D3+25(OH)D2 SERPL-MCNC: 31 NG/ML (ref 30–100)
ALBUMIN SERPL-MCNC: 4.8 G/DL (ref 3.5–5.2)
ALBUMIN/GLOB SERPL: 1.8 G/DL
ALP SERPL-CCNC: 74 U/L (ref 39–117)
ALT SERPL-CCNC: 161 U/L (ref 1–41)
AST SERPL-CCNC: 408 U/L (ref 1–40)
BASOPHILS # BLD AUTO: 0.04 10*3/MM3 (ref 0–0.2)
BASOPHILS NFR BLD AUTO: 0.6 % (ref 0–1.5)
BILIRUB SERPL-MCNC: 1.2 MG/DL (ref 0–1.2)
BUN SERPL-MCNC: 14 MG/DL (ref 6–20)
BUN/CREAT SERPL: 14.1 (ref 7–25)
CALCIUM SERPL-MCNC: 10.2 MG/DL (ref 8.6–10.5)
CHLORIDE SERPL-SCNC: 104 MMOL/L (ref 98–107)
CO2 SERPL-SCNC: 26.1 MMOL/L (ref 22–29)
CREAT SERPL-MCNC: 0.99 MG/DL (ref 0.76–1.27)
EOSINOPHIL # BLD AUTO: 0.13 10*3/MM3 (ref 0–0.4)
EOSINOPHIL NFR BLD AUTO: 2 % (ref 0.3–6.2)
ERYTHROCYTE [DISTWIDTH] IN BLOOD BY AUTOMATED COUNT: 13.6 % (ref 12.3–15.4)
GLOBULIN SER CALC-MCNC: 2.6 GM/DL
GLUCOSE SERPL-MCNC: 81 MG/DL (ref 65–99)
HCT VFR BLD AUTO: 41.1 % (ref 37.5–51)
HGB BLD-MCNC: 14 G/DL (ref 13–17.7)
IMM GRANULOCYTES # BLD AUTO: 0.02 10*3/MM3 (ref 0–0.05)
IMM GRANULOCYTES NFR BLD AUTO: 0.3 % (ref 0–0.5)
LYMPHOCYTES # BLD AUTO: 1.46 10*3/MM3 (ref 0.7–3.1)
LYMPHOCYTES NFR BLD AUTO: 22.2 % (ref 19.6–45.3)
MCH RBC QN AUTO: 29.2 PG (ref 26.6–33)
MCHC RBC AUTO-ENTMCNC: 34.1 G/DL (ref 31.5–35.7)
MCV RBC AUTO: 85.8 FL (ref 79–97)
MONOCYTES # BLD AUTO: 0.59 10*3/MM3 (ref 0.1–0.9)
MONOCYTES NFR BLD AUTO: 9 % (ref 5–12)
NEUTROPHILS # BLD AUTO: 4.35 10*3/MM3 (ref 1.7–7)
NEUTROPHILS NFR BLD AUTO: 65.9 % (ref 42.7–76)
NRBC BLD AUTO-RTO: 0 /100 WBC (ref 0–0.2)
PLATELET # BLD AUTO: 210 10*3/MM3 (ref 140–450)
POTASSIUM SERPL-SCNC: 4.4 MMOL/L (ref 3.5–5.2)
PROT SERPL-MCNC: 7.4 G/DL (ref 6–8.5)
RBC # BLD AUTO: 4.79 10*6/MM3 (ref 4.14–5.8)
SODIUM SERPL-SCNC: 140 MMOL/L (ref 136–145)
WBC # BLD AUTO: 6.59 10*3/MM3 (ref 3.4–10.8)

## 2021-06-08 DIAGNOSIS — R79.89 ELEVATED LIVER FUNCTION TESTS: ICD-10-CM

## 2021-06-08 DIAGNOSIS — K76.9 LIVER LESION: ICD-10-CM

## 2021-06-08 DIAGNOSIS — K50.00 CROHN'S DISEASE OF SMALL INTESTINE WITHOUT COMPLICATION (HCC): ICD-10-CM

## 2021-06-08 DIAGNOSIS — K75.81 NASH (NONALCOHOLIC STEATOHEPATITIS): Primary | ICD-10-CM

## 2021-06-10 ENCOUNTER — TELEPHONE (OUTPATIENT)
Dept: GASTROENTEROLOGY | Facility: CLINIC | Age: 51
End: 2021-06-10

## 2021-06-10 ENCOUNTER — LAB (OUTPATIENT)
Dept: LAB | Facility: HOSPITAL | Age: 51
End: 2021-06-10

## 2021-06-10 PROCEDURE — 36415 COLL VENOUS BLD VENIPUNCTURE: CPT | Performed by: NURSE PRACTITIONER

## 2021-06-10 PROCEDURE — 80076 HEPATIC FUNCTION PANEL: CPT | Performed by: NURSE PRACTITIONER

## 2021-06-10 PROCEDURE — 80074 ACUTE HEPATITIS PANEL: CPT | Performed by: NURSE PRACTITIONER

## 2021-06-10 NOTE — TELEPHONE ENCOUNTER
Spoke with patient.  Scheduled appt with Dr Londono on 06/14/2021 (Monday) at 8:10am  [address] [place] [address] 310.    Mandi [phone]

## 2021-06-11 LAB
ALBUMIN SERPL-MCNC: 4.6 G/DL (ref 4–5)
ALP SERPL-CCNC: 74 IU/L (ref 48–121)
ALT SERPL-CCNC: 52 IU/L (ref 0–44)
AST SERPL-CCNC: 73 IU/L (ref 0–40)
BILIRUB DIRECT SERPL-MCNC: 0.3 MG/DL (ref 0–0.4)
BILIRUB SERPL-MCNC: 1.3 MG/DL (ref 0–1.2)
HAV IGM SERPL QL IA: NEGATIVE
HBV CORE IGM SERPL QL IA: NEGATIVE
HBV SURFACE AG SERPL QL IA: NEGATIVE
HCV AB S/CO SERPL IA: <0.1 S/CO RATIO (ref 0–0.9)
PROT SERPL-MCNC: 7.1 G/DL (ref 6–8.5)

## 2021-06-17 ENCOUNTER — HOSPITAL ENCOUNTER (OUTPATIENT)
Dept: MRI IMAGING | Facility: HOSPITAL | Age: 51
Discharge: HOME | End: 2021-06-17
Admitting: INTERNAL MEDICINE

## 2021-06-17 PROCEDURE — 25010000002 GADOTERIDOL PER 1 ML: Performed by: INTERNAL MEDICINE

## 2021-06-17 PROCEDURE — 74183 MRI ABD W/O CNTR FLWD CNTR: CPT

## 2021-06-17 PROCEDURE — A9579 GAD-BASE MR CONTRAST NOS,1ML: HCPCS | Performed by: INTERNAL MEDICINE

## 2021-06-17 RX ADMIN — GADOTERIDOL 20 ML: 279.3 INJECTION, SOLUTION INTRAVENOUS at 13:36

## 2021-06-22 RX ORDER — USTEKINUMAB 90 MG/ML
90 INJECTION, SOLUTION SUBCUTANEOUS
Qty: 1 ML | Refills: 11 | Status: SHIPPED | OUTPATIENT
Start: 2021-06-22 | End: 2022-05-24 | Stop reason: SDUPTHER

## 2021-06-28 ENCOUNTER — TRANSCRIBE ORDERS (OUTPATIENT)
Dept: ADMINISTRATIVE | Facility: HOSPITAL | Age: 51
End: 2021-06-28

## 2021-06-28 ENCOUNTER — LAB (OUTPATIENT)
Dept: LAB | Facility: HOSPITAL | Age: 51
End: 2021-06-28

## 2021-06-28 DIAGNOSIS — R94.5 NONSPECIFIC ABNORMAL RESULTS OF LIVER FUNCTION STUDY: ICD-10-CM

## 2021-06-28 DIAGNOSIS — R94.5 NONSPECIFIC ABNORMAL RESULTS OF LIVER FUNCTION STUDY: Primary | ICD-10-CM

## 2021-06-28 LAB
ALBUMIN SERPL-MCNC: 4.5 G/DL (ref 3.5–5.2)
ALP SERPL-CCNC: 74 U/L (ref 39–117)
ALT SERPL W P-5'-P-CCNC: 38 U/L (ref 1–41)
AST SERPL-CCNC: 42 U/L (ref 1–40)
BILIRUB CONJ SERPL-MCNC: <0.2 MG/DL (ref 0–0.3)
BILIRUB INDIRECT SERPL-MCNC: ABNORMAL MG/DL
BILIRUB SERPL-MCNC: 1 MG/DL (ref 0–1.2)
PROT SERPL-MCNC: 7.2 G/DL (ref 6–8.5)

## 2021-06-28 PROCEDURE — 80076 HEPATIC FUNCTION PANEL: CPT

## 2021-06-28 PROCEDURE — 36415 COLL VENOUS BLD VENIPUNCTURE: CPT

## 2021-09-02 ENCOUNTER — TRANSCRIBE ORDERS (OUTPATIENT)
Dept: LAB | Facility: SURGERY CENTER | Age: 51
End: 2021-09-02

## 2021-09-02 DIAGNOSIS — Z01.818 OTHER SPECIFIED PRE-OPERATIVE EXAMINATION: Primary | ICD-10-CM

## 2021-09-02 PROBLEM — K50.919 CROHN'S DISEASE WITH COMPLICATION (HCC): Status: ACTIVE | Noted: 2021-05-26

## 2021-09-02 PROBLEM — K21.9 GASTROESOPHAGEAL REFLUX DISEASE: Status: ACTIVE | Noted: 2021-05-26

## 2021-10-13 ENCOUNTER — TELEPHONE (OUTPATIENT)
Dept: SURGERY | Facility: CLINIC | Age: 51
End: 2021-10-13

## 2021-10-13 DIAGNOSIS — E53.8 VITAMIN B12 DEFICIENCY DUE TO INTESTINAL MALABSORPTION: Primary | ICD-10-CM

## 2021-10-13 DIAGNOSIS — K90.9 VITAMIN B12 DEFICIENCY DUE TO INTESTINAL MALABSORPTION: Primary | ICD-10-CM

## 2021-10-13 NOTE — TELEPHONE ENCOUNTER
[facility] sent a renewal request via fax (not e-scribe) for B-12 injections, S/P Open ileocolic resection on 10/27/20.  Do you want to renew prescription or have Dr. Thompson or patients PCP begin to prescribe?

## 2021-10-14 RX ORDER — CYANOCOBALAMIN 1000 UG/ML
1000 INJECTION, SOLUTION INTRAMUSCULAR; SUBCUTANEOUS WEEKLY
Qty: 1000 ML | Refills: 12 | Status: SHIPPED | OUTPATIENT
Start: 2021-10-14 | End: 2021-11-04 | Stop reason: SDUPTHER

## 2021-10-14 NOTE — TELEPHONE ENCOUNTER
I spoke with the patient and informed him that Dr. Guardado has agreed to renew his prescription but he will need to start having Dr. Thompson or his PCP start prescribing. The patient voiced understanding and is going to contact Dr. Thompson's office to have him take this over for future refills.

## 2021-10-15 ENCOUNTER — TRANSCRIBE ORDERS (OUTPATIENT)
Dept: LAB | Facility: SURGERY CENTER | Age: 51
End: 2021-10-15

## 2021-10-15 DIAGNOSIS — Z01.818 OTHER SPECIFIED PRE-OPERATIVE EXAMINATION: Primary | ICD-10-CM

## 2021-11-01 ENCOUNTER — LAB (OUTPATIENT)
Dept: LAB | Facility: SURGERY CENTER | Age: 51
End: 2021-11-01

## 2021-11-01 DIAGNOSIS — Z01.818 OTHER SPECIFIED PRE-OPERATIVE EXAMINATION: ICD-10-CM

## 2021-11-01 LAB — SARS-COV-2 ORF1AB RESP QL NAA+PROBE: NOT DETECTED

## 2021-11-01 PROCEDURE — U0004 COV-19 TEST NON-CDC HGH THRU: HCPCS | Performed by: INTERNAL MEDICINE

## 2021-11-01 PROCEDURE — C9803 HOPD COVID-19 SPEC COLLECT: HCPCS

## 2021-11-03 ENCOUNTER — ANESTHESIA EVENT (OUTPATIENT)
Dept: SURGERY | Facility: SURGERY CENTER | Age: 51
End: 2021-11-03

## 2021-11-03 ENCOUNTER — ANESTHESIA (OUTPATIENT)
Dept: SURGERY | Facility: SURGERY CENTER | Age: 51
End: 2021-11-03

## 2021-11-03 ENCOUNTER — HOSPITAL ENCOUNTER (OUTPATIENT)
Facility: SURGERY CENTER | Age: 51
Setting detail: HOSPITAL OUTPATIENT SURGERY
Discharge: HOME | End: 2021-11-03
Attending: INTERNAL MEDICINE | Admitting: INTERNAL MEDICINE

## 2021-11-03 VITALS
SYSTOLIC BLOOD PRESSURE: 128 MMHG | HEIGHT: 72 IN | WEIGHT: 225 LBS | HEART RATE: 83 BPM | TEMPERATURE: 98 F | RESPIRATION RATE: 16 BRPM | DIASTOLIC BLOOD PRESSURE: 84 MMHG | OXYGEN SATURATION: 95 % | BODY MASS INDEX: 30.48 KG/M2

## 2021-11-03 DIAGNOSIS — K50.919 CROHN'S DISEASE WITH COMPLICATION, UNSPECIFIED GASTROINTESTINAL TRACT LOCATION (HCC): ICD-10-CM

## 2021-11-03 DIAGNOSIS — K21.9 GASTROESOPHAGEAL REFLUX DISEASE, UNSPECIFIED WHETHER ESOPHAGITIS PRESENT: ICD-10-CM

## 2021-11-03 DIAGNOSIS — K50.019 CROHN'S DISEASE OF ILEUM WITH COMPLICATION (HCC): ICD-10-CM

## 2021-11-03 DIAGNOSIS — Z12.11 ENCOUNTER FOR SCREENING FOR MALIGNANT NEOPLASM OF COLON: ICD-10-CM

## 2021-11-03 PROCEDURE — 0DB68ZX EXCISION OF STOMACH, VIA NATURAL OR ARTIFICIAL OPENING ENDOSCOPIC, DIAGNOSTIC: ICD-10-PCS | Performed by: INTERNAL MEDICINE

## 2021-11-03 PROCEDURE — 45380 COLONOSCOPY AND BIOPSY: CPT | Performed by: INTERNAL MEDICINE

## 2021-11-03 PROCEDURE — 25010000002 PROPOFOL 10 MG/ML EMULSION: Performed by: ANESTHESIOLOGY

## 2021-11-03 PROCEDURE — 0DBP8ZX EXCISION OF RECTUM, VIA NATURAL OR ARTIFICIAL OPENING ENDOSCOPIC, DIAGNOSTIC: ICD-10-PCS | Performed by: INTERNAL MEDICINE

## 2021-11-03 PROCEDURE — 0DB58ZX EXCISION OF ESOPHAGUS, VIA NATURAL OR ARTIFICIAL OPENING ENDOSCOPIC, DIAGNOSTIC: ICD-10-PCS | Performed by: INTERNAL MEDICINE

## 2021-11-03 PROCEDURE — 43239 EGD BIOPSY SINGLE/MULTIPLE: CPT | Performed by: INTERNAL MEDICINE

## 2021-11-03 PROCEDURE — 88305 TISSUE EXAM BY PATHOLOGIST: CPT | Performed by: INTERNAL MEDICINE

## 2021-11-03 PROCEDURE — 0DBN8ZX EXCISION OF SIGMOID COLON, VIA NATURAL OR ARTIFICIAL OPENING ENDOSCOPIC, DIAGNOSTIC: ICD-10-PCS | Performed by: INTERNAL MEDICINE

## 2021-11-03 RX ORDER — PROPOFOL 10 MG/ML
VIAL (ML) INTRAVENOUS AS NEEDED
Status: DISCONTINUED | OUTPATIENT
Start: 2021-11-03 | End: 2021-11-03 | Stop reason: SURG

## 2021-11-03 RX ORDER — SODIUM CHLORIDE, SODIUM LACTATE, POTASSIUM CHLORIDE, CALCIUM CHLORIDE 600; 310; 30; 20 MG/100ML; MG/100ML; MG/100ML; MG/100ML
30 INJECTION, SOLUTION INTRAVENOUS CONTINUOUS PRN
Status: DISCONTINUED | OUTPATIENT
Start: 2021-11-03 | End: 2021-11-03 | Stop reason: HOSPADM

## 2021-11-03 RX ORDER — SODIUM CHLORIDE 0.9 % (FLUSH) 0.9 %
10 SYRINGE (ML) INJECTION AS NEEDED
Status: DISCONTINUED | OUTPATIENT
Start: 2021-11-03 | End: 2021-11-03 | Stop reason: HOSPADM

## 2021-11-03 RX ORDER — SODIUM CHLORIDE 0.9 % (FLUSH) 0.9 %
3 SYRINGE (ML) INJECTION EVERY 12 HOURS SCHEDULED
Status: DISCONTINUED | OUTPATIENT
Start: 2021-11-03 | End: 2021-11-03 | Stop reason: HOSPADM

## 2021-11-03 RX ORDER — LIDOCAINE HYDROCHLORIDE 20 MG/ML
INJECTION, SOLUTION INFILTRATION; PERINEURAL AS NEEDED
Status: DISCONTINUED | OUTPATIENT
Start: 2021-11-03 | End: 2021-11-03 | Stop reason: SURG

## 2021-11-03 RX ADMIN — SODIUM CHLORIDE, POTASSIUM CHLORIDE, SODIUM LACTATE AND CALCIUM CHLORIDE 30 ML/HR: 600; 310; 30; 20 INJECTION, SOLUTION INTRAVENOUS at 06:49

## 2021-11-03 RX ADMIN — LIDOCAINE HYDROCHLORIDE 60 MG: 20 INJECTION, SOLUTION INFILTRATION; PERINEURAL at 07:27

## 2021-11-03 RX ADMIN — PROPOFOL 50 MG: 10 INJECTION, EMULSION INTRAVENOUS at 07:28

## 2021-11-03 RX ADMIN — PROPOFOL 100 MG: 10 INJECTION, EMULSION INTRAVENOUS at 07:27

## 2021-11-03 RX ADMIN — PROPOFOL 140 MCG/KG/MIN: 10 INJECTION, EMULSION INTRAVENOUS at 07:27

## 2021-11-03 NOTE — ANESTHESIA POSTPROCEDURE EVALUATION
"Patient: Margarito Miles    Procedure Summary     Date: 11/03/21 Room / [facility]  [facility]    Anesthesia Start: 0721 Anesthesia Stop: 0753    Procedures:       COLONOSCOPY (N/A )      ESOPHAGOGASTRODUODENOSCOPY (N/A ) Diagnosis:       Crohn's disease with complication, unspecified gastrointestinal tract location (HCC)      Gastroesophageal reflux disease, unspecified whether esophagitis present      (Crohn's disease with complication, unspecified gastrointestinal tract location (CMS/HCC) [K50.919])      (Gastroesophageal reflux disease, unspecified whether esophagitis present [K21.9])    Surgeons: Jesús Thompson MD Provider: Curt Saldivar MD    Anesthesia Type: MAC ASA Status: 3          Anesthesia Type: MAC    Vitals  Vitals Value Taken Time   /84 11/03/21 0759   Temp     Pulse 87 11/03/21 0759   Resp 16 11/03/21 0753   SpO2 95 % 11/03/21 0759   Vitals shown include unvalidated device data.        Post Anesthesia Care and Evaluation    Patient location during evaluation: bedside  Patient participation: complete - patient participated  Level of consciousness: awake and alert  Pain score: 0  Pain management: adequate  Airway patency: patent  Anesthetic complications: No anesthetic complications  PONV Status: none  Cardiovascular status: acceptable and hemodynamically stable  Respiratory status: acceptable and spontaneous ventilation  Hydration status: acceptable    Comments: /58 (BP Location: Left arm)   Pulse 80   Temp 36.2 °C (97.2 °F) (Temporal)   Resp 16   Ht 182.9 cm (72\")   Wt 102 kg (225 lb)   SpO2 96%   BMI 30.52 kg/m²         "

## 2021-11-03 NOTE — DISCHARGE INSTRUCTIONS
Education provided the Patient on the following:    - Nothing to Eat or Drink after MN the night before the procedure  -Your required COVID Test is Scheduled on *** Between the Hours of 2453-5408  -You will only be notified if your COVID test Result is POSITIVE  -The importance of reducing your number of contacts by self quarantining after you COVID test until the date of your Colonoscopy and EGD    - Avoid red/purple fluids while completing their bowel prep as ordered by physician  -Contact Gastrointerologist office for any questions about specific details regarding colon prep    -You will need to have someone drive you home after your colonoscopy and remain with you for 24 hours after the procedure  - The date of your procedure, your are welcome to have one visitor at bedside or remain within 10-15 minutes of [facility]  -Please wear warm socks when you arrive for your procedure  -Remove all jewelry and leave any valuables before arriving the day of your procedure (all will have to be removed before leaving preop)  -You will need to arrive at *0600**on 11/3*** procedure    -Feel free to contact us at: [phone] with any additional questions/concerns

## 2021-11-03 NOTE — H&P
No chief complaint on file.      HPI  crohns  S/p resection  Fh crc  H/o polyps  barretts         Problem List:    Patient Active Problem List   Diagnosis   • Crohn's disease of small intestine without complication (HCC)   • High risk medication use   • MACDONALD (nonalcoholic steatohepatitis)   • Crohn's disease of small intestine with intestinal obstruction (HCC)   • Incisional hernia   • Crohn's disease of ileum with complication (HCC)   • Encounter for screening for malignant neoplasm of colon   • Crohn's disease with complication (HCC)   • Gastroesophageal reflux disease       Medical History:    Past Medical History:   Diagnosis Date   • Asthma     as a child   • Crohn's disease (HCC)    • GERD (gastroesophageal reflux disease)    • Nonalcoholic fatty liver disease without nonalcoholic steatohepatitis (MACDONALD)    • PONV (postoperative nausea and vomiting)    • Sleep apnea     Bi-PAP        Social History:    Social History     Socioeconomic History   • Marital status:    Tobacco Use   • Smoking status: Former Smoker     Packs/day: 1.00     Years: 8.00     Pack years: 8.00     Types: Cigarettes     Quit date:      Years since quittin.8   • Smokeless tobacco: Former User     Quit date:    Vaping Use   • Vaping Use: Never used   Substance and Sexual Activity   • Alcohol use: Yes     Comment: 1 drink monthly   • Drug use: Never   • Sexual activity: Defer       Family History:   Family History   Problem Relation Age of Onset   • Colon cancer Paternal Grandfather    • Abnormal EKG Paternal Grandfather    • Lung cancer Mother    • Colon polyps Other    • Malig Hyperthermia Neg Hx    • Crohn's disease Neg Hx    • Irritable bowel syndrome Neg Hx    • Ulcerative colitis Neg Hx        Surgical History:   Past Surgical History:   Procedure Laterality Date   • APPENDECTOMY N/A     Dr. Nikky Cloud   • COLON RESECTION N/A 10/27/2020    Procedure: OPEN ILEOCOLIC RESECTION AND OPEN INCISIONAL HERNIA  REPAIR;   Surgeon: Troy Guardado MD;  Location: [facility] MAIN OR;  Service: General;  Laterality: N/A;   • COLON RESECTION     • COLON RESECTION     • COLON RESECTION     • COLON SURGERY N/A 1999, 2012 1999 Dr. Dominic Campo, 2012 Dr. Bairon Garcia   • COLONOSCOPY  2018   • COLONOSCOPY N/A 07/10/2020    Dr. Jesús Thompson, [facility]   • ENDOSCOPY N/A 2017    Dr. Jesús Thompson   • HERNIA REPAIR     • LASIK Bilateral    • ORIF ANKLE FRACTURE Left     Dr. Cesar Patton hardware   • VENTRAL/INCISIONAL HERNIA REPAIR N/A 1/26/2021    Procedure: OPEN RECURRENT INCISIONAL HERNIA REPAIR WITH MESH;  Surgeon: Troy Guardado MD;  Location: [facility] MAIN OR;  Service: General;  Laterality: N/A;         Current Facility-Administered Medications:   •  lactated ringers infusion, 30 mL/hr, Intravenous, Continuous PRN, Jesús Thompson MD, Last Rate: 30 mL/hr at 11/03/21 0649, 30 mL/hr at 11/03/21 0649  •  sodium chloride 0.9 % flush 10 mL, 10 mL, Intravenous, PRN, Jesús Thompson MD  •  sodium chloride 0.9 % flush 3 mL, 3 mL, Intravenous, Q12H, Jesús Thompson MD    Allergies:   Allergies   Allergen Reactions   • Sulfa Antibiotics Hives and Other (See Comments)     Fever, chills, flush        The following portions of the patient's history were reviewed by me and updated as appropriate: review of systems, allergies, current medications, past family history, past medical history, past social history, past surgical history and problem list.    Vitals:    11/03/21 0639   BP: 135/89   Pulse: 80   Resp: 20   Temp: 97.2 °F (36.2 °C)   SpO2: 96%       PHYSICAL EXAM:    CONSTITUTIONAL:  today's vital signs reviewed by me  GASTROINTESTINAL: abdomen is soft nontender nondistended with normal active bowel sounds, no masses are appreciated    Assessment/ Plan  crohns  S/p resection  Fh crc  H/o polyps  barretts    egd and colonosocpy    Risks and benefits as well as alternatives to endoscopic evaluation were explained to the patient  and they voiced understanding and wish to proceed.  These risks include but are not limited to the risk of bleeding, perforation, adverse reaction to sedation, and missed lesions.  The patient was given the opportunity to ask questions prior to the endoscopic procedure.

## 2021-11-03 NOTE — ANESTHESIA PREPROCEDURE EVALUATION
Anesthesia Evaluation     Patient summary reviewed and Nursing notes reviewed   history of anesthetic complications: PONV  NPO Solid Status: > 8 hours  NPO Liquid Status: > 2 hours           Airway   Mallampati: II  TM distance: >3 FB  Neck ROM: full  No difficulty expected  Dental - normal exam     Pulmonary - normal exam   (+) asthma,sleep apnea,   Cardiovascular - normal exam  Exercise tolerance: good (4-7 METS)        Neuro/Psych- negative ROS  GI/Hepatic/Renal/Endo    (+)  GERD,  hepatitis, liver disease,     Musculoskeletal (-) negative ROS    Abdominal    Substance History      OB/GYN          Other - negative ROS                       Anesthesia Plan    ASA 3     MAC     intravenous induction     Anesthetic plan, all risks, benefits, and alternatives have been provided, discussed and informed consent has been obtained with: patient.

## 2021-11-04 DIAGNOSIS — E53.8 VITAMIN B12 DEFICIENCY DUE TO INTESTINAL MALABSORPTION: ICD-10-CM

## 2021-11-04 DIAGNOSIS — K90.9 VITAMIN B12 DEFICIENCY DUE TO INTESTINAL MALABSORPTION: ICD-10-CM

## 2021-11-04 LAB
LAB AP CASE REPORT: NORMAL
LAB AP CLINICAL INFORMATION: NORMAL
PATH REPORT.FINAL DX SPEC: NORMAL
PATH REPORT.GROSS SPEC: NORMAL

## 2021-11-04 RX ORDER — CYANOCOBALAMIN 1000 UG/ML
1000 INJECTION, SOLUTION INTRAMUSCULAR; SUBCUTANEOUS WEEKLY
Qty: 1 ML | Refills: 12 | Status: SHIPPED | OUTPATIENT
Start: 2021-11-04 | End: 2022-03-21

## 2021-11-15 ENCOUNTER — LAB (OUTPATIENT)
Dept: FAMILY MEDICINE CLINIC | Facility: CLINIC | Age: 51
End: 2021-11-15

## 2021-11-15 ENCOUNTER — OFFICE VISIT (OUTPATIENT)
Dept: FAMILY MEDICINE CLINIC | Facility: CLINIC | Age: 51
End: 2021-11-15

## 2021-11-15 DIAGNOSIS — K75.81 NASH (NONALCOHOLIC STEATOHEPATITIS): ICD-10-CM

## 2021-11-15 DIAGNOSIS — K21.9 GASTROESOPHAGEAL REFLUX DISEASE, UNSPECIFIED WHETHER ESOPHAGITIS PRESENT: ICD-10-CM

## 2021-11-15 DIAGNOSIS — Z12.5 SCREENING PSA (PROSTATE SPECIFIC ANTIGEN): ICD-10-CM

## 2021-11-15 DIAGNOSIS — K50.019 CROHN'S DISEASE OF ILEUM WITH COMPLICATION (HCC): ICD-10-CM

## 2021-11-15 DIAGNOSIS — Z00.00 ANNUAL PHYSICAL EXAM: Primary | ICD-10-CM

## 2021-11-15 DIAGNOSIS — J45.20 MILD INTERMITTENT ASTHMA, UNSPECIFIED WHETHER COMPLICATED: ICD-10-CM

## 2021-11-15 DIAGNOSIS — G47.33 OBSTRUCTIVE SLEEP APNEA SYNDROME: ICD-10-CM

## 2021-11-15 PROBLEM — K50.919 CROHN'S DISEASE WITH COMPLICATION (HCC): Status: RESOLVED | Noted: 2021-05-26 | Resolved: 2021-11-15

## 2021-11-15 PROBLEM — R03.0 PREHYPERTENSION: Status: ACTIVE | Noted: 2017-01-23

## 2021-11-15 PROBLEM — K50.012 CROHN'S DISEASE OF SMALL INTESTINE WITH INTESTINAL OBSTRUCTION (HCC): Status: RESOLVED | Noted: 2020-10-19 | Resolved: 2021-11-15

## 2021-11-15 PROBLEM — Z79.899 HIGH RISK MEDICATION USE: Status: RESOLVED | Noted: 2020-06-04 | Resolved: 2021-11-15

## 2021-11-15 PROBLEM — R79.89 ABNORMAL LIVER FUNCTION TESTS: Status: ACTIVE | Noted: 2017-01-23

## 2021-11-15 PROBLEM — H91.90 HEARING LOSS: Status: ACTIVE | Noted: 2017-01-23

## 2021-11-15 PROBLEM — M85.80 OSTEOPENIA: Status: ACTIVE | Noted: 2017-01-23

## 2021-11-15 PROBLEM — Z12.11 ENCOUNTER FOR SCREENING FOR MALIGNANT NEOPLASM OF COLON: Status: RESOLVED | Noted: 2021-05-26 | Resolved: 2021-11-15

## 2021-11-15 PROBLEM — K50.00 CROHN'S DISEASE OF SMALL INTESTINE WITHOUT COMPLICATION (HCC): Status: RESOLVED | Noted: 2020-06-04 | Resolved: 2021-11-15

## 2021-11-15 PROBLEM — K82.4 POLYP OF GALLBLADDER: Status: ACTIVE | Noted: 2018-09-17

## 2021-11-15 PROBLEM — J45.909 ASTHMA: Status: ACTIVE | Noted: 2017-01-23

## 2021-11-15 PROBLEM — E53.8 VITAMIN B12 DEFICIENCY: Status: ACTIVE | Noted: 2017-01-23

## 2021-11-15 LAB
HBA1C MFR BLD: 5.1 % (ref 3.5–5.6)
INR PPP: 0.96 (ref 0.93–1.1)
PROTHROMBIN TIME: 10.7 SECONDS (ref 9.6–11.7)

## 2021-11-15 PROCEDURE — 83036 HEMOGLOBIN GLYCOSYLATED A1C: CPT | Performed by: FAMILY MEDICINE

## 2021-11-15 PROCEDURE — 84403 ASSAY OF TOTAL TESTOSTERONE: CPT | Performed by: FAMILY MEDICINE

## 2021-11-15 PROCEDURE — 80053 COMPREHEN METABOLIC PANEL: CPT | Performed by: FAMILY MEDICINE

## 2021-11-15 PROCEDURE — 85610 PROTHROMBIN TIME: CPT | Performed by: FAMILY MEDICINE

## 2021-11-15 PROCEDURE — G0103 PSA SCREENING: HCPCS | Performed by: FAMILY MEDICINE

## 2021-11-15 PROCEDURE — 85025 COMPLETE CBC W/AUTO DIFF WBC: CPT | Performed by: FAMILY MEDICINE

## 2021-11-15 PROCEDURE — 99396 PREV VISIT EST AGE 40-64: CPT | Performed by: FAMILY MEDICINE

## 2021-11-15 PROCEDURE — 84439 ASSAY OF FREE THYROXINE: CPT | Performed by: FAMILY MEDICINE

## 2021-11-15 PROCEDURE — 80061 LIPID PANEL: CPT | Performed by: FAMILY MEDICINE

## 2021-11-15 PROCEDURE — 90715 TDAP VACCINE 7 YRS/> IM: CPT | Performed by: FAMILY MEDICINE

## 2021-11-15 PROCEDURE — 82607 VITAMIN B-12: CPT | Performed by: FAMILY MEDICINE

## 2021-11-15 PROCEDURE — 82306 VITAMIN D 25 HYDROXY: CPT | Performed by: FAMILY MEDICINE

## 2021-11-15 PROCEDURE — 90471 IMMUNIZATION ADMIN: CPT | Performed by: FAMILY MEDICINE

## 2021-11-15 PROCEDURE — 84443 ASSAY THYROID STIM HORMONE: CPT | Performed by: FAMILY MEDICINE

## 2021-11-15 PROCEDURE — 36415 COLL VENOUS BLD VENIPUNCTURE: CPT | Performed by: FAMILY MEDICINE

## 2021-11-15 RX ORDER — ALBUTEROL SULFATE 90 UG/1
2 AEROSOL, METERED RESPIRATORY (INHALATION) EVERY 6 HOURS PRN
Qty: 18 G | Refills: 2 | Status: SHIPPED | OUTPATIENT
Start: 2021-11-15

## 2021-11-15 NOTE — PROGRESS NOTES
Chief Complaint   Patient presents with   • Annual Exam     HPI  Margarito Miles is a 51 y.o. male that presents for   Chief Complaint   Patient presents with   • Annual Exam     Crohn's: diagnosed at 20yo (1991) s/p 3 colon resections w/ most recent 11/2020. Follows regularly w/ GI (Jay). Maintained on Stelara 90mg monthly. Recent colonoscopy (10/2021) that revealed persistent inflammation. Likely will be changing Stelara to something else subsequently. Has already failed Remicade and Humira. He reports diarrhea at baseline but no recent abdominal pain or bloody stool. He is maintained on B12 supplementation.    MACDONALD: mild elevation in liver enzymes. Reports it is unclear how much of this elevation is due to Crohn's vs MACDONALD. Plan to obtain liver biopsy in order to help determine what options may be available for Crohn's treatment. Maintained on milk thistle, Vit E, and Zinc.    GERD: maintained on esomeprazole 40 daily. No heartburn, reflux, dysphagia. 10/2021 EGD w/o abnormality. Well controlled    PROMISE: maintained on Bipap but machine recently recalled. He reports this is mild and has been doing ok w/o machine. Does ok off machine.     Review of Systems   Constitutional: Negative for chills, fever and unexpected weight loss.   HENT: Negative for congestion, rhinorrhea, sore throat and trouble swallowing.    Eyes: Negative for visual disturbance.   Respiratory: Negative for cough and shortness of breath.    Cardiovascular: Negative for chest pain and palpitations.   Gastrointestinal: Positive for diarrhea. Negative for abdominal pain, blood in stool, constipation, nausea, vomiting, GERD and indigestion.   Genitourinary: Negative for difficulty urinating and dysuria.   Musculoskeletal: Negative for arthralgias and joint swelling.   Skin: Negative for rash and skin lesions.   Neurological: Negative for weakness and headache.   Psychiatric/Behavioral: Negative for depressed mood. The patient is not  nervous/anxious.      The following portions of the patient's history were reviewed and updated as appropriate: problem list, past medical history, past surgical history, allergies, current medications, past social history and past family history.    Problem List Tab  Patient History Tab  Immunizations Tab  Medications Tab  Chart Review Tab  Care Everywhere Tab  Synopsis Tab    PE  There were no vitals filed for this visit.  There is no height or weight on file to calculate BMI.   Vitals: Temp 97.1; ; /80; O2 97; RR 16; weight 235 pounds  General: Well nourished, NAD  Head: AT/NC  Eyes: EOMI, anicteric sclera  Resp: CTAB, SCR, BS equal  CV: RRR w/o m/r/g; 2+ pulses  GI: Soft, NT/ND, +BS  MSK: FROM, no deformity, no edema  Skin: Warm, dry, intact  Neuro: Alert and oriented. No focal deficits  Psych: Appropriate mood and affect    Imaging  No Images in the past 120 days found..    Assessment/Plan   Margarito Miles is a 51 y.o. male that presents for   Chief Complaint   Patient presents with   • Annual Exam     Diagnoses and all orders for this visit:    1. Annual physical exam (Primary)  -     Tdap Vaccine Greater Than or Equal To 6yo IM  -     CBC & Differential  -     Comprehensive Metabolic Panel  -     Hemoglobin A1c  -     Lipid Panel  -     TSH  -     T4, Free  -     Vitamin D 25 Hydroxy  -     Vitamin B12  -     PSA Screen  -     Testosterone  -  Counseled regarding diet, exercise, weight loss, and preventative health maintenance items/immunizations below    2. Crohn's disease of ileum with complication (HCC)  -     CBC & Differential  -     Comprehensive Metabolic Panel  - Continue home Stelara 90 mg monthly per GI  - Continue GI [id number]    3. MACDONALD (nonalcoholic steatohepatitis)  -     Protime-INR  - Continue home milk thistle, zinc, vitamin D per GI  - Continue GI follow-up    4. Gastroesophageal reflux disease, unspecified whether esophagitis present: Last EGD 10/2021  unremarkable   -Continue home Nexium 40 mg daily    5. Obstructive sleep apnea syndrome   -Awaiting new CPAP.  Restart upon obtaining    6. Mild intermittent asthma, unspecified whether complicated  -     Continue albuterol sulfate  (90 Base) MCG/ACT inhaler; Inhale 2 puffs Every 6 (Six) Hours As Needed for Wheezing or Shortness of Air.  Dispense: 18 g; Refill: 2    7. Screening PSA (prostate specific antigen)  -     PSA Screen    Preventative  Colonoscopy: 11/2021- gets annually 2/2 Crohn's  PSA: 0.59 (5/2019), ordered today  AAA: due 2037  Shingles: due 2022  Pneumonia: Pneumovax 1/2017  Tdap: Ordered today  Influenza: 10/2021  COVID: Completed 3 shots Pfizer (8/2021)     Return in about 1 year (around 11/15/2022) for Annual physical.

## 2021-11-16 LAB
25(OH)D3 SERPL-MCNC: 46.4 NG/ML
ALBUMIN SERPL-MCNC: 4.8 G/DL (ref 3.5–5.2)
ALBUMIN/GLOB SERPL: 1.6 G/DL
ALP SERPL-CCNC: 74 U/L (ref 39–117)
ALT SERPL W P-5'-P-CCNC: 35 U/L (ref 1–41)
ANION GAP SERPL CALCULATED.3IONS-SCNC: 9.4 MMOL/L (ref 5–15)
AST SERPL-CCNC: 38 U/L (ref 1–40)
BASOPHILS # BLD AUTO: 0.04 10*3/MM3 (ref 0–0.2)
BASOPHILS NFR BLD AUTO: 0.6 % (ref 0–1.5)
BILIRUB SERPL-MCNC: 1.2 MG/DL (ref 0–1.2)
BUN SERPL-MCNC: 20 MG/DL (ref 6–20)
BUN/CREAT SERPL: 15.5 (ref 7–25)
CALCIUM SPEC-SCNC: 9.7 MG/DL (ref 8.6–10.5)
CHLORIDE SERPL-SCNC: 101 MMOL/L (ref 98–107)
CHOLEST SERPL-MCNC: 143 MG/DL (ref 0–200)
CO2 SERPL-SCNC: 28.6 MMOL/L (ref 22–29)
CREAT SERPL-MCNC: 1.29 MG/DL (ref 0.76–1.27)
DEPRECATED RDW RBC AUTO: 41.2 FL (ref 37–54)
EOSINOPHIL # BLD AUTO: 0.08 10*3/MM3 (ref 0–0.4)
EOSINOPHIL NFR BLD AUTO: 1.2 % (ref 0.3–6.2)
ERYTHROCYTE [DISTWIDTH] IN BLOOD BY AUTOMATED COUNT: 13.2 % (ref 12.3–15.4)
GFR SERPL CREATININE-BSD FRML MDRD: 59 ML/MIN/1.73
GLOBULIN UR ELPH-MCNC: 3 GM/DL
GLUCOSE SERPL-MCNC: 82 MG/DL (ref 65–99)
HCT VFR BLD AUTO: 40.9 % (ref 37.5–51)
HDLC SERPL-MCNC: 40 MG/DL (ref 40–60)
HGB BLD-MCNC: 14 G/DL (ref 13–17.7)
IMM GRANULOCYTES # BLD AUTO: 0.02 10*3/MM3 (ref 0–0.05)
IMM GRANULOCYTES NFR BLD AUTO: 0.3 % (ref 0–0.5)
LDLC SERPL CALC-MCNC: 55 MG/DL (ref 0–100)
LDLC/HDLC SERPL: 1.01 {RATIO}
LYMPHOCYTES # BLD AUTO: 1.71 10*3/MM3 (ref 0.7–3.1)
LYMPHOCYTES NFR BLD AUTO: 25.4 % (ref 19.6–45.3)
MCH RBC QN AUTO: 29.7 PG (ref 26.6–33)
MCHC RBC AUTO-ENTMCNC: 34.2 G/DL (ref 31.5–35.7)
MCV RBC AUTO: 86.7 FL (ref 79–97)
MONOCYTES # BLD AUTO: 0.65 10*3/MM3 (ref 0.1–0.9)
MONOCYTES NFR BLD AUTO: 9.7 % (ref 5–12)
NEUTROPHILS NFR BLD AUTO: 4.23 10*3/MM3 (ref 1.7–7)
NEUTROPHILS NFR BLD AUTO: 62.8 % (ref 42.7–76)
NRBC BLD AUTO-RTO: 0 /100 WBC (ref 0–0.2)
PLATELET # BLD AUTO: 232 10*3/MM3 (ref 140–450)
PMV BLD AUTO: 10.7 FL (ref 6–12)
POTASSIUM SERPL-SCNC: 4 MMOL/L (ref 3.5–5.2)
PROT SERPL-MCNC: 7.8 G/DL (ref 6–8.5)
PSA SERPL-MCNC: 0.58 NG/ML (ref 0–4)
RBC # BLD AUTO: 4.72 10*6/MM3 (ref 4.14–5.8)
SODIUM SERPL-SCNC: 139 MMOL/L (ref 136–145)
T4 FREE SERPL-MCNC: 1.75 NG/DL (ref 0.93–1.7)
TESTOST SERPL-MCNC: 182 NG/DL (ref 249–836)
TRIGL SERPL-MCNC: 313 MG/DL (ref 0–150)
TSH SERPL DL<=0.05 MIU/L-ACNC: 2.94 UIU/ML (ref 0.27–4.2)
VIT B12 BLD-MCNC: 1018 PG/ML (ref 211–946)
VLDLC SERPL-MCNC: 48 MG/DL (ref 5–40)
WBC # BLD AUTO: 6.73 10*3/MM3 (ref 3.4–10.8)

## 2021-11-17 DIAGNOSIS — R79.89 LOW TESTOSTERONE IN MALE: Primary | ICD-10-CM

## 2021-11-17 RX ORDER — TESTOSTERONE CYPIONATE 200 MG/ML
200 INJECTION, SOLUTION INTRAMUSCULAR
Qty: 5 ML | Refills: 2 | Status: SHIPPED | OUTPATIENT
Start: 2021-11-17 | End: 2022-05-18 | Stop reason: SDUPTHER

## 2021-11-17 NOTE — PROGRESS NOTES
Spoke with pt. He does use creatinine 3 days a week when working out. Would like to try t-shots if able to do at home if ok with you. 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 Please send scripts to [facility] pharmacy. thanks

## 2022-01-05 ENCOUNTER — OFFICE VISIT (OUTPATIENT)
Dept: GASTROENTEROLOGY | Facility: CLINIC | Age: 52
End: 2022-01-05

## 2022-01-05 VITALS
HEIGHT: 72 IN | BODY MASS INDEX: 31.83 KG/M2 | DIASTOLIC BLOOD PRESSURE: 84 MMHG | OXYGEN SATURATION: 97 % | HEART RATE: 104 BPM | TEMPERATURE: 98 F | SYSTOLIC BLOOD PRESSURE: 148 MMHG | WEIGHT: 235 LBS

## 2022-01-05 DIAGNOSIS — F41.1 ANXIETY, GENERALIZED: ICD-10-CM

## 2022-01-05 DIAGNOSIS — K75.81 NASH (NONALCOHOLIC STEATOHEPATITIS): ICD-10-CM

## 2022-01-05 DIAGNOSIS — K50.019 CROHN'S DISEASE OF ILEUM WITH COMPLICATION: Primary | ICD-10-CM

## 2022-01-05 PROCEDURE — 99214 OFFICE O/P EST MOD 30 MIN: CPT | Performed by: NURSE PRACTITIONER

## 2022-01-05 RX ORDER — BUSPIRONE HYDROCHLORIDE 7.5 MG/1
TABLET ORAL
Qty: 192 TABLET | Refills: 0 | Status: SHIPPED | OUTPATIENT
Start: 2022-01-05 | End: 2022-01-17

## 2022-01-05 NOTE — PROGRESS NOTES
"Chief Complaint   Patient presents with   • Crohn's Disease           History of Present Illness  51-year-old male presents today for follow-up regarding Crohn's disease.  Status post incisional hernia repair with mesh January 26, 2021.  This was following his third ileocolonic resection October 27, 2020 for recurrent Crohn's disease.  2 previous surgeries were open ileocolonic resection, first in 1999 and the second in 2012 with Dr. Bairon Garcia.  He is status post appendectomy 1989.    Patient continues on Stelara every 4 weeks.    He has been following with [facility] hepatologist Dr Londono, last visit November 15, 2021, liver biopsy was performed November 23, 2021. He has had negative serologic work-up, previous liver biopsy in 2008 with MACDONALD.  Differential diagnosis includes autoimmune disease given Crohn's disease and use of Biologics versus drug-induced liver injury.    Previous treatments for inflammatory bowel disease include infliximab, adalimumab and azathioprine (started due to antibody formation with biologic agent previously).    Patient would like to discuss treatment options that would help prevent small bowel resections as his most recent resection, his third, was the most difficult to recover from.    He has anxiety, this has been long term, he would like to discuss treatment options.  Denies depression.       Result Review :       Vitamin B12 (11/15/2021 14:18)   CBC AND DIFFERENTIAL (11/23/2021 07:39)   CBC & Differential (11/15/2021 14:18)   Comprehensive Metabolic Panel (11/15/2021 14:18)   Tissue Pathology Exam (11/03/2021 07:31)   COLONOSCOPY (11/03/2021 07:19)   UPPER GI ENDOSCOPY (11/03/2021 07:19)   MR ashly abdomen w wo contrast with Jesús Thompson MD (06/17/2021)   MRI abdomen w wo contrast mrcp (06/17/2021 13:39)       Vital Signs:   /84   Pulse 104   Temp 98 °F (36.7 °C)   Ht 182.9 cm (72\")   Wt 107 kg (235 lb)   SpO2 97%   BMI 31.87 kg/m²     Body mass " index is 31.87 kg/m².     Physical Exam  Constitutional:       General: He is not in acute distress.     Appearance: Normal appearance. He is well-developed and normal weight. He is not ill-appearing.   Eyes:      General: No scleral icterus.  Pulmonary:      Effort: Pulmonary effort is normal. No respiratory distress.   Skin:     General: Skin is warm.      Coloration: Skin is not jaundiced or pale.   Neurological:      Mental Status: He is alert.       Assessment and Plan    Diagnoses and all orders for this visit:    1. Crohn's disease of ileum with complication (HCC) (Primary)    2. MACDONALD (nonalcoholic steatohepatitis)    3. Anxiety, generalized  -     busPIRone (BUSPAR) 7.5 MG tablet; Take 1 tab by mouth 2 Times a Day for 4 days, THEN 2 tabs 2 Times a Day for 4 days, THEN 3 tabs 2 Times a Day for 4 days, THEN 4 tabs 2 Times a Day for 18 days  Dispense: 192 tablet; Refill: 0       Reviewed recent EGD and colonoscopy.    For anxiety, will start buspirone, titration instructions discussed, prescription sent to pharmacy.    Acid reflux, chronic, stable, will continue Nexium.    For Crohn's disease, will continue Stelara at this time, discussed treatment options, patient has had recurrence 1 year after resection and has failed adalimumab, infliximab and now ustekinumab.  We will discuss today's office visit with Dr. Thompson and contact patient with additional recommendations.    Reviewed liver biopsy results, methotrexate use is not recommended given liver biopsy findings.  We will also discuss this with Dr. Thompson.    Based on treatment recommendations, will need to obtain appropriate blood work, no orders placed at this time as treatment options and blood work may vary based on treatment decision.    ADDENDUM:   Discussed with Dr. Thompson.  He recommends referral via telemedicine if possible to [facility] inflammatory bowel disease center.  Patient informed.  I will look into the logistics and cost etc.  and contact patient next week with further information.  Nora    EMR Dragon/Transcription Disclaimer:  This document has been Dictated utilizing Dragon dictation.

## 2022-01-14 ENCOUNTER — TELEPHONE (OUTPATIENT)
Dept: GASTROENTEROLOGY | Facility: CLINIC | Age: 52
End: 2022-01-14

## 2022-01-14 DIAGNOSIS — F41.1 ANXIETY, GENERALIZED: Primary | ICD-10-CM

## 2022-01-14 NOTE — TELEPHONE ENCOUNTER
Mable from [facility] calls stating patient's insurance won't cover buspirone 7.5 mg, but will cover buspirone 15 mg which can be divided 3 ways.  RX needs to be changed to 15 mgs please. Pharmacy in chart is NOT correct as it couldn't be pulled up by fax# to make it accurate in chart.      Phone # [phone]  Case# [id number]  Fax#[phone]

## 2022-01-17 RX ORDER — BUSPIRONE HYDROCHLORIDE 15 MG/1
30 TABLET ORAL 2 TIMES DAILY
Qty: 180 TABLET | Refills: 2 | Status: SHIPPED | OUTPATIENT
Start: 2022-01-17 | End: 2022-06-30 | Stop reason: SDUPTHER

## 2022-01-17 NOTE — TELEPHONE ENCOUNTER
New prescription sent electronically to pharmacy, patient instructed of change in instructions and tablet dosage per insurance preference.  Nora

## 2022-01-17 NOTE — TELEPHONE ENCOUNTER
Cassidy,   can you see if you can find the correct pharmacy in [facility] for this patient, see message below.     If you cant find it, I couldn't this am when I tried to enter it, can you call them and ask more details and maybe they have a different name, fax number, etc????    they do not want us to call Rx in, want us to send electronically unless no other option.     Can you see what you can find out and let me know, I need to change his Rx.     Thanks.   Jamie

## 2022-01-17 NOTE — TELEPHONE ENCOUNTER
I called and spoke with Kaylynn from [facility]. She verified that [facility] takes care of Rx coverage, but the pharmacy that actually fills the Rx is [facility], which is the pharmacy listed in [employer]. She also verified that buspirone 15 mg would be covered.

## 2022-03-18 ENCOUNTER — OFFICE VISIT (OUTPATIENT)
Dept: FAMILY MEDICINE CLINIC | Facility: CLINIC | Age: 52
End: 2022-03-18

## 2022-03-18 ENCOUNTER — LAB (OUTPATIENT)
Dept: FAMILY MEDICINE CLINIC | Facility: CLINIC | Age: 52
End: 2022-03-18

## 2022-03-18 VITALS
DIASTOLIC BLOOD PRESSURE: 80 MMHG | HEIGHT: 72 IN | SYSTOLIC BLOOD PRESSURE: 130 MMHG | OXYGEN SATURATION: 97 % | WEIGHT: 228 LBS | TEMPERATURE: 96.9 F | BODY MASS INDEX: 30.88 KG/M2 | HEART RATE: 81 BPM | RESPIRATION RATE: 17 BRPM

## 2022-03-18 DIAGNOSIS — R79.89 LOW TESTOSTERONE IN MALE: Primary | ICD-10-CM

## 2022-03-18 PROCEDURE — 85027 COMPLETE CBC AUTOMATED: CPT | Performed by: FAMILY MEDICINE

## 2022-03-18 PROCEDURE — 84403 ASSAY OF TOTAL TESTOSTERONE: CPT | Performed by: FAMILY MEDICINE

## 2022-03-18 PROCEDURE — 36415 COLL VENOUS BLD VENIPUNCTURE: CPT | Performed by: FAMILY MEDICINE

## 2022-03-18 PROCEDURE — 99212 OFFICE O/P EST SF 10 MIN: CPT | Performed by: FAMILY MEDICINE

## 2022-03-18 NOTE — PROGRESS NOTES
Chief Complaint   Patient presents with   • Anxiety     HPI  Margarito Miles is a 51 y.o. male that presents for   Chief Complaint   Patient presents with   • Anxiety     LowT: patient was started on 200mg testosterone IM q2 weeks at last visit. Last injection was 2 weeks ago. He reports mild improvement to energy/strength and libido has improved as well.     Review of Systems   Constitutional: Negative for fatigue, fever and unexpected weight loss.   Genitourinary: Negative for decreased libido.     The following portions of the patient's history were reviewed and updated as appropriate: problem list, past medical history, past surgical history, allergies, current medication    Problem List Tab  Patient History Tab  Immunizations Tab  Medications Tab  Chart Review Tab  Care Everywhere Tab  Synopsis Tab    PE  Vitals:    03/18/22 1438   BP: 130/80   Pulse: 81   Resp: 17   Temp: 96.9 °F (36.1 °C)   SpO2: 97%     Body mass index is 30.92 kg/m².  General: Well nourished, NAD  Head: AT/NC  Eyes: EOMI, anicteric sclera  Resp: CTAB, SCR, BS equal  CV: RRR w/o m/r/g; 2+ pulses  GI: Soft, NT/ND, +BS  MSK: FROM, no deformity, no edema  Skin: Warm, dry, intact  Neuro: Alert and oriented. No focal deficits  Psych: Appropriate mood and affect    Imaging  No Images in the past 120 days found..    Assessment/Plan   Margarito Miles is a 51 y.o. male that presents for   Chief Complaint   Patient presents with   • Anxiety     Diagnoses and all orders for this visit:    1. Low testosterone in male (Primary)  -     CBC (No Diff)  -     Testosterone  - Continue testosterone 200 mg IM every 2 weeks pending labs above     No follow-ups on file.

## 2022-03-19 LAB
DEPRECATED RDW RBC AUTO: 43.3 FL (ref 37–54)
ERYTHROCYTE [DISTWIDTH] IN BLOOD BY AUTOMATED COUNT: 13.7 % (ref 12.3–15.4)
HCT VFR BLD AUTO: 47.9 % (ref 37.5–51)
HGB BLD-MCNC: 15.9 G/DL (ref 13–17.7)
MCH RBC QN AUTO: 29.1 PG (ref 26.6–33)
MCHC RBC AUTO-ENTMCNC: 33.2 G/DL (ref 31.5–35.7)
MCV RBC AUTO: 87.7 FL (ref 79–97)
PLATELET # BLD AUTO: 235 10*3/MM3 (ref 140–450)
PMV BLD AUTO: 10.7 FL (ref 6–12)
RBC # BLD AUTO: 5.46 10*6/MM3 (ref 4.14–5.8)
TESTOST SERPL-MCNC: 214 NG/DL (ref 249–836)
WBC NRBC COR # BLD: 7.15 10*3/MM3 (ref 3.4–10.8)

## 2022-03-21 DIAGNOSIS — K90.9 VITAMIN B12 DEFICIENCY DUE TO INTESTINAL MALABSORPTION: ICD-10-CM

## 2022-03-21 DIAGNOSIS — E53.8 VITAMIN B12 DEFICIENCY DUE TO INTESTINAL MALABSORPTION: ICD-10-CM

## 2022-03-21 RX ORDER — CYANOCOBALAMIN 1000 UG/ML
1000 INJECTION, SOLUTION INTRAMUSCULAR; SUBCUTANEOUS WEEKLY
Qty: 1 ML | Refills: 12 | Status: SHIPPED | OUTPATIENT
Start: 2022-03-21 | End: 2022-06-14 | Stop reason: SDUPTHER

## 2022-05-18 DIAGNOSIS — R79.89 LOW TESTOSTERONE IN MALE: ICD-10-CM

## 2022-05-18 RX ORDER — TESTOSTERONE CYPIONATE 200 MG/ML
200 INJECTION, SOLUTION INTRAMUSCULAR
Qty: 5 ML | Refills: 2 | Status: SHIPPED | OUTPATIENT
Start: 2022-05-18 | End: 2022-12-19 | Stop reason: SDUPTHER

## 2022-05-24 ENCOUNTER — TELEPHONE (OUTPATIENT)
Dept: FAMILY MEDICINE CLINIC | Facility: CLINIC | Age: 52
End: 2022-05-24

## 2022-05-24 RX ORDER — USTEKINUMAB 90 MG/ML
90 INJECTION, SOLUTION SUBCUTANEOUS
Qty: 1 ML | Refills: 8 | Status: SHIPPED | OUTPATIENT
Start: 2022-05-24 | End: 2023-01-16 | Stop reason: SDUPTHER

## 2022-05-24 RX ORDER — USTEKINUMAB 90 MG/ML
90 INJECTION, SOLUTION SUBCUTANEOUS
Qty: 1 ML | Refills: 11 | Status: CANCELLED | OUTPATIENT
Start: 2022-05-24

## 2022-05-24 NOTE — TELEPHONE ENCOUNTER
Caller: Margarito Miles    Relationship to patient: Self    Best call back number: [phone]    Patient is needing: PATIENT CALLED AND SAID THAT HIS INSURANCE COMPANY CALLED AND SAID THEY'VE TRIED TO GET A PRIOR AUTHORIZATION COMPLETED FOR HIS TESTOSTERONE MEDICATION    THE INSURANCE COMPANY ASKED HIM TO CONTACT THE OFFICE ABOUT GETTING THIS COMPLETED     HE SAID THE INSURANCE COMPANY SAID THE OFFICE NEEDED TO CONTACT THEM THROUGH THIS PHONE NUMBER     [facility]: [phone]

## 2022-06-14 DIAGNOSIS — E53.8 VITAMIN B12 DEFICIENCY DUE TO INTESTINAL MALABSORPTION: ICD-10-CM

## 2022-06-14 DIAGNOSIS — K90.9 VITAMIN B12 DEFICIENCY DUE TO INTESTINAL MALABSORPTION: ICD-10-CM

## 2022-06-14 RX ORDER — CYANOCOBALAMIN 1000 UG/ML
1000 INJECTION, SOLUTION INTRAMUSCULAR; SUBCUTANEOUS WEEKLY
Qty: 1 ML | Refills: 12 | Status: SHIPPED | OUTPATIENT
Start: 2022-06-14 | End: 2022-08-29 | Stop reason: SDUPTHER

## 2022-06-30 DIAGNOSIS — F41.1 ANXIETY, GENERALIZED: ICD-10-CM

## 2022-06-30 RX ORDER — BUSPIRONE HYDROCHLORIDE 30 MG/1
30 TABLET ORAL 2 TIMES DAILY
Qty: 60 TABLET | Refills: 2 | Status: SHIPPED | OUTPATIENT
Start: 2022-06-30

## 2022-08-29 DIAGNOSIS — E53.8 VITAMIN B12 DEFICIENCY DUE TO INTESTINAL MALABSORPTION: ICD-10-CM

## 2022-08-29 DIAGNOSIS — K90.9 VITAMIN B12 DEFICIENCY DUE TO INTESTINAL MALABSORPTION: ICD-10-CM

## 2022-08-29 RX ORDER — CYANOCOBALAMIN 1000 UG/ML
1000 INJECTION, SOLUTION INTRAMUSCULAR; SUBCUTANEOUS WEEKLY
Qty: 1 ML | Refills: 12 | Status: SHIPPED | OUTPATIENT
Start: 2022-08-29 | End: 2022-09-29 | Stop reason: SDUPTHER

## 2022-08-29 RX ORDER — CYANOCOBALAMIN 1000 UG/ML
1000 INJECTION, SOLUTION INTRAMUSCULAR; SUBCUTANEOUS WEEKLY
Qty: 1 ML | Refills: 12 | Status: CANCELLED | OUTPATIENT
Start: 2022-08-29

## 2022-09-27 ENCOUNTER — OFFICE VISIT (OUTPATIENT)
Dept: SLEEP MEDICINE | Facility: CLINIC | Age: 52
End: 2022-09-27

## 2022-09-27 VITALS
BODY MASS INDEX: 30.88 KG/M2 | SYSTOLIC BLOOD PRESSURE: 142 MMHG | WEIGHT: 228 LBS | OXYGEN SATURATION: 97 % | DIASTOLIC BLOOD PRESSURE: 95 MMHG | HEIGHT: 72 IN | HEART RATE: 91 BPM

## 2022-09-27 DIAGNOSIS — R06.83 SNORING: Primary | ICD-10-CM

## 2022-09-27 DIAGNOSIS — G47.33 OSA (OBSTRUCTIVE SLEEP APNEA): ICD-10-CM

## 2022-09-27 DIAGNOSIS — G47.10 HYPERSOMNIA: ICD-10-CM

## 2022-09-27 PROBLEM — G47.30 OBSERVED SLEEP APNEA: Status: ACTIVE | Noted: 2017-01-23

## 2022-09-27 PROCEDURE — G0463 HOSPITAL OUTPT CLINIC VISIT: HCPCS

## 2022-09-27 PROCEDURE — 99203 OFFICE O/P NEW LOW 30 MIN: CPT | Performed by: INTERNAL MEDICINE

## 2022-09-27 NOTE — PROGRESS NOTES
[facility]  [facility] [place] [address]  Phone   Fax       Margarito Miles  1970  [phone]      Type of service: Initial New Patient Office Visit  Date of service: 9/27/2022    Chief Complaint   Patient presents with   • Witnessed Apnea   • Snoring   • Daytime Sleepiness   • Fatigue   • Non-restorative Sleep   • Dry Mouth       History of present illness;  Margarito Miles  is a new patient for me and was seen today for sleep related problems of snoring, non-restorative sleep and witnessed apneas.  He has a history of obstructive sleep apnea was tested approximately about 10 years ago but unable to locate the study.  He is using CPAP which is on a recall and its more than 10 years old.  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  He says without the CPAP he has unrefreshed sleep feels tired and has snoring.    Patient gives the following sleep history.  Sleep schedule:  Bedtime: 8 PM  Wake time: 4:30 AM  Normally takes about 30-60 minutes to fall asleep  Average hours of sleep 7-8  Number of naps per day 0      Past medical history: (Relevant to sleep medicine)  1. Sleep apnea  2. Hyperlipidemia  3. Crohn's    • Medications are reviewed by me and documented in the encounter  • Allergies reviewed and documented in encounter    Social history:  Do you drive a commercial vehicle:  No   Shift work:  No   Tobacco use:  No   Alcohol use:  0 per week  Caffeinated drinks: 1-2    FAMILY HISTORY (Your mother, father, brothers and sisters)  1. No history of sleep apnea    REVIEW OF SYSTEMS.  Full review of systems available on the intake form which is scanned in the media tab.  The relevant positive are noted below  1. Daytime excessive sleepiness with [place] Sleepiness Scale :  10  2. Snoring  3. Fatigue      Physical exam:  There were no vitals filed for this visit. There is no height or weight on file to calculate BMI.     HEENT: Head is atraumatic, normocephalic  Eyes: pupils are round equal and reacting to light and accommodation, conjunctiva normal  Nose: no nasal septal defects or deviation and the nasal passages are clear, no nasal polyps,  Throat: tonsils are not enlarged, tongue normal, oral airway Mallampati class 3  NECK: , trachea is in the midline, thyroid not enlarged  RESPIRATORY SYSTEM: Breath sounds are equal on both sides, there are no wheezes   CARDIOVASULAR SYSTEM: Heart sounds are regular rhythm and jose rate, no edema  EXTREMITES: No cyanosis, clubbing  NEUROLOGICAL SYSTEM: Oriented x 3, no gross motor defects, gait normal      Assessment and plan:  · Obstructive sleep apnea.  Going to proceed with a home sleep test.  I have discussed the test procedure and he is agreeable.  Once the home sleep test is done we will set up a CPAP and following and 31 to 90 days for compliance  · Snoring (R06.83) snoring is the sound created by turbulent airflow vibrating upper airway soft tissue.  I have also discussed factors affecting snoring including sleep deprivation, sleeping on the back and alcohol ingestion. To minimize snoring, patient is advised to have adequate sleep, sleep on the side and avoid alcohol and sedative medications before bedtime  · Daytime excessive sleepiness .  It was assessed with [place] Sleepiness Scale of  .10  There are many causes for daytime excessive sleepiness including sleep depression, shiftwork syndrome, depression and other medical disorders including heart, kidney and liver failure.  The most serious cause of excessive sleepiness is due to neurological conditions like narcolepsy/cataplexy.  But the most common cause of excessive sleepiness is due to sleep apnea with frequent awakenings during sleep time.  I have discussed safety of driving and to remain vigilant while driving.    I have also discussed with the patient the following  • Sleep hygiene: Maintaining a regular bedtime and wake  time, not to watch television or work in bed, limit caffeine-containing beverages before bed time and avoid naps during the day  • Adequate amount of sleep.  Generally most people needs about 7 to 8 hours of sleep.  • Return for 31 to 90 days after PAP setup with down load..  Patient's questions were answered.        Marlo Li MD  Sleep Medicine.  Medical Director, [facility], [facility]  9/27/2022 ,

## 2022-09-28 ENCOUNTER — TELEPHONE (OUTPATIENT)
Dept: GASTROENTEROLOGY | Facility: CLINIC | Age: 52
End: 2022-09-28

## 2022-09-28 DIAGNOSIS — K50.019 CROHN'S DISEASE OF ILEUM WITH COMPLICATION: Primary | ICD-10-CM

## 2022-09-28 RX ORDER — PREDNISONE 10 MG/1
40 TABLET ORAL DAILY
Qty: 120 TABLET | Refills: 0 | Status: SHIPPED | OUTPATIENT
Start: 2022-09-28 | End: 2022-10-25 | Stop reason: SDUPTHER

## 2022-09-28 NOTE — TELEPHONE ENCOUNTER
----- Message from Margarito Miles sent at 9/28/2022  8:07 AM EDT -----  Regarding: Flare-up  Am experiencing significant flare-up with nausea, profound fatigue, and increased stool frequency.   Seeking appointment and Rx for Prednisone or other aid to get upper hand and some symptom relief.  Please advise.  Some of the worst symptoms I’ve experienced to date with Crohn’s.

## 2022-09-28 NOTE — TELEPHONE ENCOUNTER
Spoke with patient via telephone.  He has had profound fatigue and symptoms concerning for a flare for approximately 2 weeks.  Symptoms have worsened with diarrhea and urgency.  No rectal bleeding.  We will start prednisone 40 mg once daily, appointment scheduled for tomorrow for blood work and physical exam and to discuss treatment options.    Nora

## 2022-09-29 ENCOUNTER — OFFICE VISIT (OUTPATIENT)
Dept: GASTROENTEROLOGY | Facility: CLINIC | Age: 52
End: 2022-09-29

## 2022-09-29 ENCOUNTER — LAB (OUTPATIENT)
Dept: LAB | Facility: HOSPITAL | Age: 52
End: 2022-09-29

## 2022-09-29 VITALS
TEMPERATURE: 98.7 F | HEART RATE: 105 BPM | HEIGHT: 72 IN | OXYGEN SATURATION: 96 % | BODY MASS INDEX: 30.85 KG/M2 | WEIGHT: 227.8 LBS | SYSTOLIC BLOOD PRESSURE: 150 MMHG | DIASTOLIC BLOOD PRESSURE: 110 MMHG

## 2022-09-29 DIAGNOSIS — E53.8 B12 DEFICIENCY: ICD-10-CM

## 2022-09-29 DIAGNOSIS — E53.8 VITAMIN B12 DEFICIENCY DUE TO INTESTINAL MALABSORPTION: ICD-10-CM

## 2022-09-29 DIAGNOSIS — R10.9 ABDOMINAL CRAMPING: ICD-10-CM

## 2022-09-29 DIAGNOSIS — K90.9 VITAMIN B12 DEFICIENCY DUE TO INTESTINAL MALABSORPTION: ICD-10-CM

## 2022-09-29 DIAGNOSIS — K50.019 CROHN'S DISEASE OF ILEUM WITH COMPLICATION: Primary | ICD-10-CM

## 2022-09-29 DIAGNOSIS — R19.7 DIARRHEA, UNSPECIFIED TYPE: ICD-10-CM

## 2022-09-29 LAB
25(OH)D3 SERPL-MCNC: 43.5 NG/ML (ref 30–100)
ALBUMIN SERPL-MCNC: 5.1 G/DL (ref 3.5–5.2)
ALP SERPL-CCNC: 68 U/L (ref 39–117)
ALT SERPL W P-5'-P-CCNC: 36 U/L (ref 1–41)
AST SERPL-CCNC: 31 U/L (ref 1–40)
BASOPHILS # BLD AUTO: 0 10*3/MM3 (ref 0–0.2)
BASOPHILS NFR BLD AUTO: 0 % (ref 0–1.5)
BILIRUB CONJ SERPL-MCNC: 0.3 MG/DL (ref 0–0.3)
BILIRUB INDIRECT SERPL-MCNC: 2.1 MG/DL
BILIRUB SERPL-MCNC: 2.4 MG/DL (ref 0–1.2)
DEPRECATED RDW RBC AUTO: 42.5 FL (ref 37–54)
EOSINOPHIL # BLD AUTO: 0 10*3/MM3 (ref 0–0.4)
EOSINOPHIL NFR BLD AUTO: 0 % (ref 0.3–6.2)
ERYTHROCYTE [DISTWIDTH] IN BLOOD BY AUTOMATED COUNT: 13.2 % (ref 12.3–15.4)
ERYTHROCYTE [SEDIMENTATION RATE] IN BLOOD: 3 MM/HR (ref 0–15)
HAV IGM SERPL QL IA: NORMAL
HBV CORE IGM SERPL QL IA: NORMAL
HBV SURFACE AG SERPL QL IA: NORMAL
HCT VFR BLD AUTO: 47.9 % (ref 37.5–51)
HCV AB SER DONR QL: NORMAL
HGB BLD-MCNC: 16 G/DL (ref 13–17.7)
IMM GRANULOCYTES # BLD AUTO: 0.04 10*3/MM3 (ref 0–0.05)
IMM GRANULOCYTES NFR BLD AUTO: 0.3 % (ref 0–0.5)
LYMPHOCYTES # BLD AUTO: 0.52 10*3/MM3 (ref 0.7–3.1)
LYMPHOCYTES NFR BLD AUTO: 4 % (ref 19.6–45.3)
MCH RBC QN AUTO: 29.4 PG (ref 26.6–33)
MCHC RBC AUTO-ENTMCNC: 33.4 G/DL (ref 31.5–35.7)
MCV RBC AUTO: 88.1 FL (ref 79–97)
MONOCYTES # BLD AUTO: 0.2 10*3/MM3 (ref 0.1–0.9)
MONOCYTES NFR BLD AUTO: 1.5 % (ref 5–12)
NEUTROPHILS NFR BLD AUTO: 12.39 10*3/MM3 (ref 1.7–7)
NEUTROPHILS NFR BLD AUTO: 94.2 % (ref 42.7–76)
NRBC BLD AUTO-RTO: 0 /100 WBC (ref 0–0.2)
PLATELET # BLD AUTO: 249 10*3/MM3 (ref 140–450)
PMV BLD AUTO: 9.9 FL (ref 6–12)
PROT SERPL-MCNC: 8.4 G/DL (ref 6–8.5)
RBC # BLD AUTO: 5.44 10*6/MM3 (ref 4.14–5.8)
VIT B12 BLD-MCNC: 1001 PG/ML (ref 211–946)
WBC NRBC COR # BLD: 13.15 10*3/MM3 (ref 3.4–10.8)

## 2022-09-29 PROCEDURE — 82306 VITAMIN D 25 HYDROXY: CPT | Performed by: NURSE PRACTITIONER

## 2022-09-29 PROCEDURE — 80076 HEPATIC FUNCTION PANEL: CPT | Performed by: NURSE PRACTITIONER

## 2022-09-29 PROCEDURE — 85652 RBC SED RATE AUTOMATED: CPT | Performed by: NURSE PRACTITIONER

## 2022-09-29 PROCEDURE — 99214 OFFICE O/P EST MOD 30 MIN: CPT | Performed by: NURSE PRACTITIONER

## 2022-09-29 PROCEDURE — 85025 COMPLETE CBC W/AUTO DIFF WBC: CPT | Performed by: NURSE PRACTITIONER

## 2022-09-29 PROCEDURE — 80074 ACUTE HEPATITIS PANEL: CPT | Performed by: NURSE PRACTITIONER

## 2022-09-29 PROCEDURE — 82607 VITAMIN B-12: CPT | Performed by: NURSE PRACTITIONER

## 2022-09-29 PROCEDURE — 36415 COLL VENOUS BLD VENIPUNCTURE: CPT | Performed by: NURSE PRACTITIONER

## 2022-09-29 PROCEDURE — 86480 TB TEST CELL IMMUN MEASURE: CPT | Performed by: NURSE PRACTITIONER

## 2022-09-29 RX ORDER — CYANOCOBALAMIN 1000 UG/ML
1000 INJECTION, SOLUTION INTRAMUSCULAR; SUBCUTANEOUS WEEKLY
Qty: 1 ML | Refills: 12 | Status: SHIPPED | OUTPATIENT
Start: 2022-09-29

## 2022-09-29 RX ORDER — ONDANSETRON 4 MG/1
4 TABLET, FILM COATED ORAL EVERY 8 HOURS PRN
Qty: 30 TABLET | Refills: 1 | Status: SHIPPED | OUTPATIENT
Start: 2022-09-29

## 2022-09-29 RX ORDER — DICYCLOMINE HCL 20 MG
20 TABLET ORAL 3 TIMES DAILY
Qty: 90 TABLET | Refills: 5 | Status: SHIPPED | OUTPATIENT
Start: 2022-09-29

## 2022-09-29 NOTE — PROGRESS NOTES
"Chief Complaint   Patient presents with   • Crohn's Disease     Worsening diarrhea, abdominal pain, fatigue           History of Present Illness  52-year-old male presents today for work in appointment regarding flare of Crohn's disease.  He has had 3 small bowel resections most recently October 27, 2020 with ileocolonic resection for recurrent Crohn's disease.  2 previous surgeries were open ileocolonic resection the first in 1999 and the second in 2012 with Dr. Bairon Garcia.  He is status post incisional hernia repair with mesh placement January 2021.  Also status post appendectomy 1989.    He contacted the office yesterday reporting a flare of symptoms for the past 2 weeks that has progressively worsened with increased diarrhea, abdominal cramping, abdominal pain, nausea and fatigue.  No vomiting.  He is having regular bowel movements.  He has had diarrhea and looser stools since his resection but is having more frequent watery bowel movements and nocturnal bowel movements over the past couple of days.    No recent antibiotic use.    He continues on Stelara every 4 weeks.    Previous treatments for inflammatory bowel disease include infliximab, adalimumab and azathioprine) started due to antibody formation with previous biologic agent).    He has been following with [facility] hepatologist Dr. Londono for elevated liver enzymes.  Liver biopsy November 23, 2021 (see below).  Negative serologic work-up.  Previous liver biopsy in 2008 with MACDONALD.  Differential diagnosis includes autoimmune disease given Crohn's disease and use of biologic versus drug-induced liver injury.    Review of Systems      Result Review :       MRI abdomen w wo contrast mrcp (06/17/2021 13:39)   UPPER GI ENDOSCOPY (11/03/2021 07:19)   COLONOSCOPY (11/03/2021 07:19)   Tissue Pathology Exam (11/03/2021 07:31)     Vital Signs:   BP (!) 150/110   Pulse 105   Temp 98.7 °F (37.1 °C)   Ht 182.9 cm (72\")   Wt 103 kg (227 lb 12.8 " oz)   SpO2 96%   BMI 30.90 kg/m²     Body mass index is 30.9 kg/m².     Physical Exam  Vitals reviewed.   Constitutional:       General: He is not in acute distress.     Appearance: Normal appearance. He is not ill-appearing, toxic-appearing or diaphoretic.   Abdominal:      General: Bowel sounds are normal. There is no distension.      Palpations: Abdomen is soft. Abdomen is not rigid. There is no mass or pulsatile mass.      Tenderness: There is abdominal tenderness. There is no guarding or rebound.   Neurological:      Mental Status: He is alert.         Assessment and Plan    Diagnoses and all orders for this visit:    1. Crohn's disease of ileum with complication (HCC) (Primary)  -     Vitamin B12  -     CBC & Differential  -     Sedimentation Rate  -     Hepatic Function Panel  -     QuantiFERON TB Gold  -     Hepatitis Panel, Acute  -     Cancel: Vitamin D 25 Hydroxy  -     Vitamin D 25 Hydroxy    2. Diarrhea, unspecified type  -     Vitamin B12  -     CBC & Differential  -     Sedimentation Rate  -     Hepatic Function Panel  -     QuantiFERON TB Gold  -     Hepatitis Panel, Acute  -     Cancel: Vitamin D 25 Hydroxy  -     Vitamin D 25 Hydroxy    3. Abdominal cramping  -     Vitamin B12  -     CBC & Differential  -     Sedimentation Rate  -     Hepatic Function Panel  -     QuantiFERON TB Gold  -     Hepatitis Panel, Acute  -     Cancel: Vitamin D 25 Hydroxy  -     Vitamin D 25 Hydroxy    4. B12 deficiency  -     Vitamin B12  -     CBC & Differential  -     Sedimentation Rate  -     Hepatic Function Panel  -     QuantiFERON TB Gold  -     Hepatitis Panel, Acute  -     Cancel: Vitamin D 25 Hydroxy  -     Vitamin D 25 Hydroxy    5. Vitamin B12 deficiency due to intestinal malabsorption  -     cyanocobalamin 1000 MCG/ML injection; Inject 1 mL under the skin into the appropriate area as directed 1 (One) Time Per Week.  Dispense: 1 mL; Refill: 12    Other orders  -     ondansetron (ZOFRAN) 4 MG tablet; Take 1  tablet by mouth Every 8 (Eight) Hours As Needed for Nausea or Vomiting.  Dispense: 30 tablet; Refill: 1  -     dicyclomine (BENTYL) 20 MG tablet; Take 1 tablet by mouth 3 (Three) Times a Day.  Dispense: 90 tablet; Refill: 5       Crohn's ileitis, last endoscopic evaluation with moderate recurrence.    Patient is status post ileocolonic resection x3 most recently October 2020.  Symptoms concerning for Crohn's flare, will start prednisone 40 mg once daily.  Will obtain blood work including vitamin B-12, vitamin D.  He continues on weekly B12.  We will also obtain blood work QuantiFERON and acute hepatitis panel for possible initiation of a different biologic therapy.    Patient will be switching insurances and needs to establish care with [facility] gastroenterologist, will place referral to Dr. Vinay Rocha to establish care and discuss treatment options.        EMR Dragon/Transcription Disclaimer:  This document has been Dictated utilizing Dragon dictation.

## 2022-10-01 LAB
GAMMA INTERFERON BACKGROUND BLD IA-ACNC: 0.01 IU/ML
M TB IFN-G BLD-IMP: ABNORMAL
M TB IFN-G CD4+ BCKGRND COR BLD-ACNC: 0.01 IU/ML
M TB IFN-G CD4+CD8+ BCKGRND COR BLD-ACNC: 0.01 IU/ML
MITOGEN IGNF BLD-ACNC: 0.47 IU/ML
SERVICE CMNT-IMP: ABNORMAL

## 2022-10-04 DIAGNOSIS — R79.89 ELEVATED LIVER FUNCTION TESTS: ICD-10-CM

## 2022-10-04 DIAGNOSIS — K75.81 NASH (NONALCOHOLIC STEATOHEPATITIS): Primary | ICD-10-CM

## 2022-10-04 DIAGNOSIS — Z79.899 HIGH RISK MEDICATION USE: ICD-10-CM

## 2022-10-05 ENCOUNTER — LAB (OUTPATIENT)
Dept: LAB | Facility: HOSPITAL | Age: 52
End: 2022-10-05

## 2022-10-05 DIAGNOSIS — R79.89 ELEVATED LIVER FUNCTION TESTS: ICD-10-CM

## 2022-10-05 DIAGNOSIS — Z79.899 HIGH RISK MEDICATION USE: ICD-10-CM

## 2022-10-05 DIAGNOSIS — K75.81 NASH (NONALCOHOLIC STEATOHEPATITIS): ICD-10-CM

## 2022-10-05 DIAGNOSIS — R94.5 NONSPECIFIC ABNORMAL RESULTS OF LIVER FUNCTION STUDY: Primary | ICD-10-CM

## 2022-10-05 LAB
BILIRUB CONJ SERPL-MCNC: 0.3 MG/DL (ref 0–0.3)
BILIRUB INDIRECT SERPL-MCNC: 1 MG/DL
BILIRUB SERPL-MCNC: 1.3 MG/DL (ref 0–1.2)

## 2022-10-05 PROCEDURE — 36415 COLL VENOUS BLD VENIPUNCTURE: CPT | Performed by: NURSE PRACTITIONER

## 2022-10-05 PROCEDURE — 82248 BILIRUBIN DIRECT: CPT

## 2022-10-05 PROCEDURE — 86480 TB TEST CELL IMMUN MEASURE: CPT

## 2022-10-05 PROCEDURE — 82247 BILIRUBIN TOTAL: CPT

## 2022-10-07 LAB
GAMMA INTERFERON BACKGROUND BLD IA-ACNC: 0 IU/ML
M TB IFN-G BLD-IMP: NEGATIVE
M TB IFN-G CD4+ BCKGRND COR BLD-ACNC: 0 IU/ML
M TB IFN-G CD4+CD8+ BCKGRND COR BLD-ACNC: 0 IU/ML
MITOGEN IGNF BLD-ACNC: 0.64 IU/ML
QUANTIFERON INCUBATION: NORMAL
SERVICE CMNT-IMP: NORMAL

## 2022-10-18 ENCOUNTER — HOSPITAL ENCOUNTER (OUTPATIENT)
Dept: SLEEP MEDICINE | Facility: HOSPITAL | Age: 52
Discharge: HOME | End: 2022-10-18
Admitting: INTERNAL MEDICINE

## 2022-10-18 DIAGNOSIS — G47.33 OSA (OBSTRUCTIVE SLEEP APNEA): ICD-10-CM

## 2022-10-18 DIAGNOSIS — G47.10 HYPERSOMNIA: ICD-10-CM

## 2022-10-18 DIAGNOSIS — R06.83 SNORING: ICD-10-CM

## 2022-10-18 PROCEDURE — 95806 SLEEP STUDY UNATT&RESP EFFT: CPT

## 2022-10-18 PROCEDURE — 95806 SLEEP STUDY UNATT&RESP EFFT: CPT | Performed by: INTERNAL MEDICINE

## 2022-10-25 DIAGNOSIS — K50.019 CROHN'S DISEASE OF ILEUM WITH COMPLICATION: ICD-10-CM

## 2022-10-25 RX ORDER — PREDNISONE 10 MG/1
20 TABLET ORAL DAILY
Qty: 60 TABLET | Refills: 0 | Status: SHIPPED | OUTPATIENT
Start: 2022-10-25 | End: 2022-11-18

## 2022-11-01 ENCOUNTER — OFFICE VISIT (OUTPATIENT)
Dept: SLEEP MEDICINE | Facility: CLINIC | Age: 52
End: 2022-11-01

## 2022-11-01 VITALS
HEART RATE: 92 BPM | SYSTOLIC BLOOD PRESSURE: 163 MMHG | BODY MASS INDEX: 31.15 KG/M2 | DIASTOLIC BLOOD PRESSURE: 89 MMHG | WEIGHT: 230 LBS | OXYGEN SATURATION: 99 % | HEIGHT: 72 IN

## 2022-11-01 DIAGNOSIS — G47.10 HYPERSOMNIA: ICD-10-CM

## 2022-11-01 DIAGNOSIS — G47.30 OBSERVED SLEEP APNEA: Primary | ICD-10-CM

## 2022-11-01 DIAGNOSIS — R06.83 SNORING: ICD-10-CM

## 2022-11-01 PROBLEM — E66.811 CLASS 1 OBESITY: Status: ACTIVE | Noted: 2022-11-01

## 2022-11-01 PROBLEM — E66.9 CLASS 1 OBESITY: Status: ACTIVE | Noted: 2022-11-01

## 2022-11-01 PROCEDURE — G0463 HOSPITAL OUTPT CLINIC VISIT: HCPCS

## 2022-11-01 NOTE — PROGRESS NOTES
"  [address] [address]  Phone   Fax       SLEEP CLINIC FOLLOW UP PROGRESS NOTE.    Margarito Miles  [id number]   1970  52 y.o.  male      PCP: Branden Haynes MD      Date of visit: 11/1/2022    Chief Complaint   Patient presents with   • Witnessed Apnea   • Snoring   • Non-restorative Sleep   • Dry Mouth   • Fatigue   • Daytime Sleepiness       HPI:  This is a 52 y.o. years old patient is here for the discussion of home sleep study results.  The home sleep study showed significant snoring but did not show evidence of sleep apnea.  However patient's symptoms are very highly suggestive of sleep apnea.  He still has daytime excessive sleepiness and the symptoms are interfering with his daily work.  He works in the ICU.  He says that he cannot focus and is tired and has nonrestorative sleep.  I have discussed the test results.  Unfortunately the home sleep study underestimates sleep apnea and with the continued symptoms patient needs a in PSG..    Medications and allergies are reviewed by me and documented in the encounter.     SOCIAL (habits pertaining to sleep medicine)  History tobacco use:No   History of alcohol use: 0 per week  Caffeine use: 3     REVIEW OF SYSTEMS:   [place] Sleepiness Scale :10  Nasal congestion:No   Dry mouth/nose:No   Post nasal drip; No   Acid reflux/Heartburn:No   Abd bloating:No   Morning headache:No   Anxiety:No   Depression:No    PHYSICAL EXAMINATION:  CONSTITUTIONAL:  Vitals:    11/01/22 0844   BP: 163/89   BP Location: Left arm   Patient Position: Sitting   Cuff Size: Adult   Pulse: 92   SpO2: 99%   Weight: 104 kg (230 lb)   Height: 182.9 cm (72\")    Body mass index is 31.19 kg/m².   NOSE: nasal passages are clear, No deformities noted   RESP SYSTEM: Not in any respiratory distress, no chest deformities noted,   CARDIOVASULAR: No edema noted  NEURO: Oriented x 3, gait normal,  Mood and affect " appeared appropriate            ASSESSMENT AND PLAN:  · Obstructive sleep apnea.  Patient's symptoms are highly suggestive of sleep apnea.  Unfortunately the home sleep study underestimated the events.  As the patient is continues to have symptoms we will proceed with a an PSG to evaluate his severity of sleep apnea.  · Obesity  1 with BMI is Body mass index is 31.19 kg/m².. I have discuss the relationship between the weight and sleep apnea. The benefit of weight loss in reducing severity of sleep apnea was discussed. Discussed diet and exercise with the patient to achieve ideal BMI.  · Snoring  · Hypersomnia  · Nonrestorative sleep  · Return for 31 to 90 days after PAP setup with down load. . Patient's questions were answered.      Marlo Li MD  Sleep Medicine.  Medical Director, [facility] sleep [facility]  11/1/2022 ,

## 2022-11-17 ENCOUNTER — TRANSCRIBE ORDERS (OUTPATIENT)
Dept: LAB | Facility: HOSPITAL | Age: 52
End: 2022-11-17

## 2022-11-17 ENCOUNTER — LAB (OUTPATIENT)
Dept: LAB | Facility: HOSPITAL | Age: 52
End: 2022-11-17

## 2022-11-17 DIAGNOSIS — K50.80 CROHN'S DISEASE OF BOTH SMALL AND LARGE INTESTINE WITHOUT COMPLICATIONS: ICD-10-CM

## 2022-11-17 DIAGNOSIS — K50.80 CROHN'S DISEASE OF BOTH SMALL AND LARGE INTESTINE WITHOUT COMPLICATIONS: Primary | ICD-10-CM

## 2022-11-17 LAB
25(OH)D3 SERPL-MCNC: 35.2 NG/ML (ref 30–100)
ADV 40+41 DNA STL QL NAA+NON-PROBE: NOT DETECTED
ALBUMIN SERPL-MCNC: 4.7 G/DL (ref 3.5–5.2)
ALBUMIN/GLOB SERPL: 1.7 G/DL
ALP SERPL-CCNC: 59 U/L (ref 39–117)
ALT SERPL W P-5'-P-CCNC: 38 U/L (ref 1–41)
ANION GAP SERPL CALCULATED.3IONS-SCNC: 9.6 MMOL/L (ref 5–15)
AST SERPL-CCNC: 25 U/L (ref 1–40)
ASTRO TYP 1-8 RNA STL QL NAA+NON-PROBE: NOT DETECTED
BASOPHILS # BLD AUTO: 0.03 10*3/MM3 (ref 0–0.2)
BASOPHILS NFR BLD AUTO: 0.4 % (ref 0–1.5)
BILIRUB SERPL-MCNC: 1.1 MG/DL (ref 0–1.2)
BUN SERPL-MCNC: 13 MG/DL (ref 6–20)
BUN/CREAT SERPL: 10.5 (ref 7–25)
C CAYETANENSIS DNA STL QL NAA+NON-PROBE: NOT DETECTED
C COLI+JEJ+UPSA DNA STL QL NAA+NON-PROBE: NOT DETECTED
C DIFF GDH + TOXINS A+B STL QL IA.RAPID: NEGATIVE
C DIFF GDH + TOXINS A+B STL QL IA.RAPID: NEGATIVE
CALCIUM SPEC-SCNC: 9.6 MG/DL (ref 8.6–10.5)
CHLORIDE SERPL-SCNC: 105 MMOL/L (ref 98–107)
CO2 SERPL-SCNC: 27.4 MMOL/L (ref 22–29)
CREAT SERPL-MCNC: 1.24 MG/DL (ref 0.76–1.27)
CRYPTOSP DNA STL QL NAA+NON-PROBE: NOT DETECTED
DEPRECATED RDW RBC AUTO: 44.2 FL (ref 37–54)
E HISTOLYT DNA STL QL NAA+NON-PROBE: NOT DETECTED
EAEC PAA PLAS AGGR+AATA ST NAA+NON-PRB: NOT DETECTED
EC STX1+STX2 GENES STL QL NAA+NON-PROBE: NOT DETECTED
EGFRCR SERPLBLD CKD-EPI 2021: 70.8 ML/MIN/1.73
EOSINOPHIL # BLD AUTO: 0.11 10*3/MM3 (ref 0–0.4)
EOSINOPHIL NFR BLD AUTO: 1.6 % (ref 0.3–6.2)
EPEC EAE GENE STL QL NAA+NON-PROBE: NOT DETECTED
ERYTHROCYTE [DISTWIDTH] IN BLOOD BY AUTOMATED COUNT: 13.9 % (ref 12.3–15.4)
ETEC LTA+ST1A+ST1B TOX ST NAA+NON-PROBE: NOT DETECTED
FERRITIN SERPL-MCNC: 67.7 NG/ML (ref 30–400)
G LAMBLIA DNA STL QL NAA+NON-PROBE: NOT DETECTED
GLOBULIN UR ELPH-MCNC: 2.7 GM/DL
GLUCOSE SERPL-MCNC: 103 MG/DL (ref 65–99)
HCT VFR BLD AUTO: 44.1 % (ref 37.5–51)
HGB BLD-MCNC: 15.4 G/DL (ref 13–17.7)
IMM GRANULOCYTES # BLD AUTO: 0.02 10*3/MM3 (ref 0–0.05)
IMM GRANULOCYTES NFR BLD AUTO: 0.3 % (ref 0–0.5)
IRON 24H UR-MRATE: 93 MCG/DL (ref 59–158)
IRON SATN MFR SERPL: 17 % (ref 20–50)
LYMPHOCYTES # BLD AUTO: 1.35 10*3/MM3 (ref 0.7–3.1)
LYMPHOCYTES NFR BLD AUTO: 19.3 % (ref 19.6–45.3)
MCH RBC QN AUTO: 30.9 PG (ref 26.6–33)
MCHC RBC AUTO-ENTMCNC: 34.9 G/DL (ref 31.5–35.7)
MCV RBC AUTO: 88.4 FL (ref 79–97)
MONOCYTES # BLD AUTO: 0.65 10*3/MM3 (ref 0.1–0.9)
MONOCYTES NFR BLD AUTO: 9.3 % (ref 5–12)
NEUTROPHILS NFR BLD AUTO: 4.83 10*3/MM3 (ref 1.7–7)
NEUTROPHILS NFR BLD AUTO: 69.1 % (ref 42.7–76)
NOROVIRUS GI+II RNA STL QL NAA+NON-PROBE: NOT DETECTED
NRBC BLD AUTO-RTO: 0 /100 WBC (ref 0–0.2)
P SHIGELLOIDES DNA STL QL NAA+NON-PROBE: NOT DETECTED
PLATELET # BLD AUTO: 194 10*3/MM3 (ref 140–450)
PMV BLD AUTO: 10.8 FL (ref 6–12)
POTASSIUM SERPL-SCNC: 4.2 MMOL/L (ref 3.5–5.2)
PROT SERPL-MCNC: 7.4 G/DL (ref 6–8.5)
RBC # BLD AUTO: 4.99 10*6/MM3 (ref 4.14–5.8)
RVA RNA STL QL NAA+NON-PROBE: NOT DETECTED
S ENT+BONG DNA STL QL NAA+NON-PROBE: NOT DETECTED
SAPO I+II+IV+V RNA STL QL NAA+NON-PROBE: NOT DETECTED
SHIGELLA SP+EIEC IPAH ST NAA+NON-PROBE: NOT DETECTED
SODIUM SERPL-SCNC: 142 MMOL/L (ref 136–145)
TIBC SERPL-MCNC: 557 MCG/DL (ref 298–536)
TRANSFERRIN SERPL-MCNC: 374 MG/DL (ref 200–360)
V CHOL+PARA+VUL DNA STL QL NAA+NON-PROBE: NOT DETECTED
V CHOLERAE DNA STL QL NAA+NON-PROBE: NOT DETECTED
WBC NRBC COR # BLD: 6.99 10*3/MM3 (ref 3.4–10.8)
Y ENTEROCOL DNA STL QL NAA+NON-PROBE: NOT DETECTED

## 2022-11-17 PROCEDURE — 83993 ASSAY FOR CALPROTECTIN FECAL: CPT

## 2022-11-17 PROCEDURE — 87507 IADNA-DNA/RNA PROBE TQ 12-25: CPT

## 2022-11-17 PROCEDURE — 80299 QUANTITATIVE ASSAY DRUG: CPT

## 2022-11-17 PROCEDURE — G0103 PSA SCREENING: HCPCS | Performed by: FAMILY MEDICINE

## 2022-11-17 PROCEDURE — 82607 VITAMIN B-12: CPT | Performed by: FAMILY MEDICINE

## 2022-11-17 PROCEDURE — 80050 GENERAL HEALTH PANEL: CPT

## 2022-11-17 PROCEDURE — 84439 ASSAY OF FREE THYROXINE: CPT | Performed by: FAMILY MEDICINE

## 2022-11-17 PROCEDURE — 82728 ASSAY OF FERRITIN: CPT

## 2022-11-17 PROCEDURE — 83540 ASSAY OF IRON: CPT

## 2022-11-17 PROCEDURE — 87324 CLOSTRIDIUM AG IA: CPT

## 2022-11-17 PROCEDURE — 36415 COLL VENOUS BLD VENIPUNCTURE: CPT

## 2022-11-17 PROCEDURE — 84630 ASSAY OF ZINC: CPT

## 2022-11-17 PROCEDURE — 82306 VITAMIN D 25 HYDROXY: CPT

## 2022-11-17 PROCEDURE — 82397 CHEMILUMINESCENT ASSAY: CPT

## 2022-11-17 PROCEDURE — 87449 NOS EACH ORGANISM AG IA: CPT

## 2022-11-17 PROCEDURE — 84466 ASSAY OF TRANSFERRIN: CPT

## 2022-11-17 PROCEDURE — 80061 LIPID PANEL: CPT | Performed by: FAMILY MEDICINE

## 2022-11-18 ENCOUNTER — OFFICE VISIT (OUTPATIENT)
Dept: FAMILY MEDICINE CLINIC | Facility: CLINIC | Age: 52
End: 2022-11-18

## 2022-11-18 VITALS
TEMPERATURE: 96.2 F | HEART RATE: 56 BPM | BODY MASS INDEX: 31.97 KG/M2 | SYSTOLIC BLOOD PRESSURE: 138 MMHG | WEIGHT: 236 LBS | HEIGHT: 72 IN | RESPIRATION RATE: 16 BRPM | DIASTOLIC BLOOD PRESSURE: 84 MMHG | OXYGEN SATURATION: 97 %

## 2022-11-18 DIAGNOSIS — K50.019 CROHN'S DISEASE OF ILEUM WITH COMPLICATION: ICD-10-CM

## 2022-11-18 DIAGNOSIS — G47.30 OBSERVED SLEEP APNEA: ICD-10-CM

## 2022-11-18 DIAGNOSIS — Z00.00 ANNUAL PHYSICAL EXAM: Primary | ICD-10-CM

## 2022-11-18 DIAGNOSIS — Z12.5 SCREENING PSA (PROSTATE SPECIFIC ANTIGEN): ICD-10-CM

## 2022-11-18 DIAGNOSIS — K21.9 GASTROESOPHAGEAL REFLUX DISEASE, UNSPECIFIED WHETHER ESOPHAGITIS PRESENT: ICD-10-CM

## 2022-11-18 DIAGNOSIS — K75.81 NASH (NONALCOHOLIC STEATOHEPATITIS): ICD-10-CM

## 2022-11-18 DIAGNOSIS — E53.8 VITAMIN B12 DEFICIENCY: ICD-10-CM

## 2022-11-18 DIAGNOSIS — F32.A ANXIETY AND DEPRESSION: ICD-10-CM

## 2022-11-18 DIAGNOSIS — R79.89 LOW TESTOSTERONE: ICD-10-CM

## 2022-11-18 DIAGNOSIS — F41.9 ANXIETY AND DEPRESSION: ICD-10-CM

## 2022-11-18 PROBLEM — H91.90 HEARING LOSS: Status: RESOLVED | Noted: 2017-01-23 | Resolved: 2022-11-18

## 2022-11-18 PROBLEM — R03.0 PREHYPERTENSION: Status: RESOLVED | Noted: 2017-01-23 | Resolved: 2022-11-18

## 2022-11-18 LAB
CHOLEST SERPL-MCNC: 155 MG/DL (ref 0–200)
HDLC SERPL-MCNC: 39 MG/DL (ref 40–60)
LDLC SERPL CALC-MCNC: 85 MG/DL (ref 0–100)
LDLC/HDLC SERPL: 2.05 {RATIO}
PSA SERPL-MCNC: 0.62 NG/ML (ref 0–4)
T4 FREE SERPL-MCNC: 1.49 NG/DL (ref 0.93–1.7)
TRIGL SERPL-MCNC: 181 MG/DL (ref 0–150)
TSH SERPL DL<=0.05 MIU/L-ACNC: 2.5 UIU/ML (ref 0.27–4.2)
VIT B12 BLD-MCNC: 1632 PG/ML (ref 211–946)
VLDLC SERPL-MCNC: 31 MG/DL (ref 5–40)

## 2022-11-18 PROCEDURE — 90750 HZV VACC RECOMBINANT IM: CPT | Performed by: FAMILY MEDICINE

## 2022-11-18 PROCEDURE — 99396 PREV VISIT EST AGE 40-64: CPT | Performed by: FAMILY MEDICINE

## 2022-11-18 PROCEDURE — 90471 IMMUNIZATION ADMIN: CPT | Performed by: FAMILY MEDICINE

## 2022-11-18 RX ORDER — CHOLESTYRAMINE 4 G/9G
4 POWDER, FOR SUSPENSION ORAL
COMMUNITY
Start: 2022-11-15 | End: 2023-11-15

## 2022-11-20 LAB
CALPROTECTIN STL-MCNT: 45 UG/G (ref 0–120)
ZINC SERPL-MCNC: 83 UG/DL (ref 44–115)

## 2022-12-05 LAB
USTEKINUMAB AB SERPL IA-MCNC: <40 NG/ML
USTEKINUMAB SERPL IA-MCNC: 7.5 UG/ML

## 2022-12-19 DIAGNOSIS — R79.89 LOW TESTOSTERONE IN MALE: ICD-10-CM

## 2022-12-19 RX ORDER — CHOLESTYRAMINE LIGHT 4 G/5.7G
POWDER, FOR SUSPENSION ORAL
COMMUNITY
Start: 2022-11-21

## 2022-12-19 RX ORDER — TESTOSTERONE CYPIONATE 200 MG/ML
200 INJECTION, SOLUTION INTRAMUSCULAR
Qty: 5 ML | Refills: 2 | Status: CANCELLED | OUTPATIENT
Start: 2022-12-19

## 2022-12-19 RX ORDER — CHOLESTYRAMINE 4 G/9G
4 POWDER, FOR SUSPENSION ORAL
Status: CANCELLED | OUTPATIENT
Start: 2022-12-19 | End: 2023-12-19

## 2022-12-19 RX ORDER — TESTOSTERONE CYPIONATE 200 MG/ML
200 INJECTION, SOLUTION INTRAMUSCULAR
Qty: 5 ML | Refills: 2 | Status: SHIPPED | OUTPATIENT
Start: 2022-12-19

## 2022-12-22 ENCOUNTER — HOSPITAL ENCOUNTER (OUTPATIENT)
Dept: SLEEP MEDICINE | Facility: HOSPITAL | Age: 52
End: 2022-12-22

## 2023-01-16 RX ORDER — USTEKINUMAB 90 MG/ML
90 INJECTION, SOLUTION SUBCUTANEOUS
Qty: 1 ML | Refills: 8 | Status: CANCELLED | OUTPATIENT
Start: 2023-01-16

## 2023-01-18 RX ORDER — USTEKINUMAB 90 MG/ML
90 INJECTION, SOLUTION SUBCUTANEOUS
Qty: 1 ML | Refills: 8 | Status: SHIPPED | OUTPATIENT
Start: 2023-01-18

## 2023-01-20 ENCOUNTER — TELEPHONE (OUTPATIENT)
Dept: GASTROENTEROLOGY | Facility: CLINIC | Age: 53
End: 2023-01-20
Payer: COMMERCIAL

## 2023-01-20 NOTE — TELEPHONE ENCOUNTER
Patient now sees GI Dr Raymundo.  Called patient to let him know the PA was denied for Stelara Q four weeks.  He is no longer under care with this office.  pk

## 2023-02-02 ENCOUNTER — HOSPITAL ENCOUNTER (OUTPATIENT)
Dept: SLEEP MEDICINE | Facility: HOSPITAL | Age: 53
Discharge: HOME | End: 2023-02-02
Admitting: INTERNAL MEDICINE
Payer: COMMERCIAL

## 2023-02-02 VITALS — WEIGHT: 235.89 LBS | BODY MASS INDEX: 31.95 KG/M2 | HEIGHT: 72 IN

## 2023-02-02 DIAGNOSIS — R06.83 SNORING: ICD-10-CM

## 2023-02-02 DIAGNOSIS — G47.10 HYPERSOMNIA: ICD-10-CM

## 2023-02-02 DIAGNOSIS — G47.30 OBSERVED SLEEP APNEA: ICD-10-CM

## 2023-02-02 PROCEDURE — 95810 POLYSOM 6/> YRS 4/> PARAM: CPT | Performed by: INTERNAL MEDICINE

## 2023-02-02 PROCEDURE — 95810 POLYSOM 6/> YRS 4/> PARAM: CPT

## 2023-02-10 ENCOUNTER — CLINICAL SUPPORT (OUTPATIENT)
Dept: FAMILY MEDICINE CLINIC | Facility: CLINIC | Age: 53
End: 2023-02-10
Payer: COMMERCIAL

## 2023-02-10 DIAGNOSIS — Z23 NEED FOR SHINGLES VACCINE: Primary | ICD-10-CM

## 2023-02-10 PROCEDURE — 90750 HZV VACC RECOMBINANT IM: CPT | Performed by: FAMILY MEDICINE

## 2023-02-14 DIAGNOSIS — R06.83 SNORING: ICD-10-CM

## 2023-02-14 DIAGNOSIS — G47.33 OSA (OBSTRUCTIVE SLEEP APNEA): Primary | ICD-10-CM

## 2023-05-11 ENCOUNTER — OFFICE VISIT (OUTPATIENT)
Dept: FAMILY MEDICINE CLINIC | Facility: CLINIC | Age: 53
End: 2023-05-11
Payer: COMMERCIAL

## 2023-05-11 VITALS
SYSTOLIC BLOOD PRESSURE: 144 MMHG | HEART RATE: 89 BPM | DIASTOLIC BLOOD PRESSURE: 104 MMHG | WEIGHT: 235 LBS | OXYGEN SATURATION: 97 % | HEIGHT: 72 IN | BODY MASS INDEX: 31.83 KG/M2

## 2023-05-11 DIAGNOSIS — I10 HYPERTENSION, UNSPECIFIED TYPE: Primary | ICD-10-CM

## 2023-05-11 PROCEDURE — 99213 OFFICE O/P EST LOW 20 MIN: CPT | Performed by: FAMILY MEDICINE

## 2023-05-11 RX ORDER — LISINOPRIL 20 MG/1
20 TABLET ORAL DAILY
Qty: 90 TABLET | Refills: 1 | Status: SHIPPED | OUTPATIENT
Start: 2023-05-11

## 2023-05-11 RX ORDER — RISANKIZUMAB-RZAA 360 MG/2.4
360 WEARABLE INJECTOR SUBCUTANEOUS
COMMUNITY
Start: 2023-03-17

## 2023-05-11 NOTE — PROGRESS NOTES
Chief Complaint   Patient presents with   • Hypertension     CC: pt states that his BP started going higher about 4 weeks ago, They found when check up for Skirizi.      HPI  Margarito Miles is a 52 y.o. male that presents for   Chief Complaint   Patient presents with   • Hypertension     CC: pt states that his BP started going higher about 4 weeks ago, They found when check up for Skirizi.      HTN: 144/108 today. Noted on last 2 Skyrizi infusions. Never maintained on any medications. Interested in doing something. No LH/dizziness, CP, palpitations.    Review of Systems   Respiratory: Negative for cough and shortness of breath.    Cardiovascular: Negative for chest pain and palpitations.   Musculoskeletal: Positive for arthralgias.   Neurological: Negative for dizziness and light-headedness.     The following portions of the patient's history were reviewed and updated as appropriate: problem list, past medical history, past surgical history, allergies, current medication    Problem List Tab  Patient History Tab  Immunizations Tab  Medications Tab  Chart Review Tab  Care Everywhere Tab  Synopsis Tab    PE  Vitals:    05/11/23 1136   BP: (!) 144/104   Pulse: 89   SpO2: 97%     Body mass index is 31.86 kg/m².  General: Obese, NAD  Head: AT/NC  Eyes: EOMI, anicteric sclera  Resp: CTAB, SCR, BS equal  CV: RRR w/o m/r/g; 2+ pulses  GI: Soft, NT/ND, +BS  MSK: FROM, no deformity, no edema  Skin: Warm, dry, intact  Neuro: Alert and oriented. No focal deficits  Psych: Appropriate mood and affect    Imaging  No Images in the past 120 days found..    Assessment & Plan   Margarito Miles is a 52 y.o. male that presents for   Chief Complaint   Patient presents with   • Hypertension     CC: pt states that his BP started going higher about 4 weeks ago, They found when check up for Skirizi.      Diagnoses and all orders for this visit:    1. Hypertension, unspecified type (Primary): 144/104 today  -     Start lisinopril  (PRINIVIL,ZESTRIL) 20 MG tablet; Take 1 tablet by mouth Daily.  Dispense: 90 tablet; Refill: 1       Return in about 4 months (around 9/11/2023) for Recheck- 15min- HTN.

## 2023-05-15 ENCOUNTER — LAB (OUTPATIENT)
Dept: LAB | Facility: HOSPITAL | Age: 53
End: 2023-05-15
Payer: COMMERCIAL

## 2023-05-15 DIAGNOSIS — R79.89 LOW TESTOSTERONE: ICD-10-CM

## 2023-05-15 DIAGNOSIS — Z00.00 ANNUAL PHYSICAL EXAM: ICD-10-CM

## 2023-05-15 LAB
DEPRECATED RDW RBC AUTO: 47.7 FL (ref 37–54)
ERYTHROCYTE [DISTWIDTH] IN BLOOD BY AUTOMATED COUNT: 14.5 % (ref 12.3–15.4)
HCT VFR BLD AUTO: 46.3 % (ref 37.5–51)
HGB BLD-MCNC: 15.7 G/DL (ref 13–17.7)
MCH RBC QN AUTO: 30.5 PG (ref 26.6–33)
MCHC RBC AUTO-ENTMCNC: 33.9 G/DL (ref 31.5–35.7)
MCV RBC AUTO: 90 FL (ref 79–97)
PLATELET # BLD AUTO: 182 10*3/MM3 (ref 140–450)
PMV BLD AUTO: 9 FL (ref 6–12)
RBC # BLD AUTO: 5.15 10*6/MM3 (ref 4.14–5.8)
TESTOST SERPL-MCNC: 321 NG/DL (ref 193–740)
WBC NRBC COR # BLD: 7.7 10*3/MM3 (ref 3.4–10.8)

## 2023-05-15 PROCEDURE — 36415 COLL VENOUS BLD VENIPUNCTURE: CPT

## 2023-05-15 PROCEDURE — 85027 COMPLETE CBC AUTOMATED: CPT

## 2023-05-15 PROCEDURE — 84403 ASSAY OF TOTAL TESTOSTERONE: CPT

## 2023-05-16 ENCOUNTER — TELEPHONE (OUTPATIENT)
Dept: FAMILY MEDICINE CLINIC | Facility: CLINIC | Age: 53
End: 2023-05-16
Payer: COMMERCIAL

## 2023-05-16 NOTE — TELEPHONE ENCOUNTER
----- Message from Branden Haynes MD sent at 5/16/2023  7:01 AM EDT -----  Testosterone and blood counts look great- no changes

## 2023-09-11 ENCOUNTER — OFFICE VISIT (OUTPATIENT)
Dept: FAMILY MEDICINE CLINIC | Facility: CLINIC | Age: 53
End: 2023-09-11
Payer: COMMERCIAL

## 2023-09-11 VITALS
HEART RATE: 101 BPM | SYSTOLIC BLOOD PRESSURE: 118 MMHG | HEIGHT: 72 IN | RESPIRATION RATE: 16 BRPM | OXYGEN SATURATION: 98 % | WEIGHT: 234 LBS | DIASTOLIC BLOOD PRESSURE: 86 MMHG | BODY MASS INDEX: 31.69 KG/M2

## 2023-09-11 DIAGNOSIS — M25.561 CHRONIC PAIN OF RIGHT KNEE: ICD-10-CM

## 2023-09-11 DIAGNOSIS — I10 PRIMARY HYPERTENSION: Primary | ICD-10-CM

## 2023-09-11 DIAGNOSIS — G89.29 CHRONIC PAIN OF RIGHT KNEE: ICD-10-CM

## 2023-09-11 PROCEDURE — 99213 OFFICE O/P EST LOW 20 MIN: CPT | Performed by: FAMILY MEDICINE

## 2023-09-11 NOTE — PROGRESS NOTES
Chief Complaint   Patient presents with    Hypertension     HPI  Margarito Miles is a 53 y.o. male that presents for   Chief Complaint   Patient presents with    Hypertension     HTN: 118/86 today. Maintained on lisinopril 20mg daily. No LH/dizziness, CP, cough or SOB    Review of Systems   Respiratory:  Negative for cough and shortness of breath.    Cardiovascular:  Negative for chest pain and palpitations.   Neurological:  Negative for dizziness and light-headedness.     The following portions of the patient's history were reviewed and updated as appropriate: problem list, past medical history, past surgical history, allergies, current medication    Problem List Tab  Patient History Tab  Immunizations Tab  Medications Tab  Chart Review Tab  Care Everywhere Tab  Synopsis Tab    PE  Vitals:    09/11/23 1438   BP: 118/86   Pulse: 101   Resp: 16   SpO2: 98%     Body mass index is 31.74 kg/m².  General: Obese, NAD  Head: AT/NC  Eyes: EOMI, anicteric sclera  Resp: CTAB, SCR, BS equal  CV: RRR w/o m/r/g; 2+ pulses  GI: Soft, NT/ND, +BS  MSK: FROM, no deformity, no edema  Skin: Warm, dry, intact  Neuro: Alert and oriented. No focal deficits  Psych: Appropriate mood and affect    Imaging  No Images in the past 120 days found..    Assessment & Plan   Margarito Miles is a 53 y.o. male that presents for   Chief Complaint   Patient presents with    Hypertension     Diagnoses and all orders for this visit:    1. Primary hypertension (Primary): 118/86 today   - Cont home lisinopril 20 daily    2. Chronic pain of right knee  -     XR Knee 3 View Right; Future     Return in about 10 weeks (around 11/20/2023) for Annual physical.

## 2023-10-17 DIAGNOSIS — J45.20 MILD INTERMITTENT ASTHMA, UNSPECIFIED WHETHER COMPLICATED: ICD-10-CM

## 2023-10-17 DIAGNOSIS — R79.89 LOW TESTOSTERONE IN MALE: ICD-10-CM

## 2023-10-18 RX ORDER — ALBUTEROL SULFATE 90 UG/1
2 AEROSOL, METERED RESPIRATORY (INHALATION) EVERY 6 HOURS PRN
Qty: 18 G | Refills: 2 | Status: SHIPPED | OUTPATIENT
Start: 2023-10-18

## 2023-10-18 RX ORDER — TESTOSTERONE CYPIONATE 200 MG/ML
200 INJECTION, SOLUTION INTRAMUSCULAR
Qty: 5 ML | Refills: 2 | Status: SHIPPED | OUTPATIENT
Start: 2023-10-18

## 2023-10-27 DIAGNOSIS — I10 HYPERTENSION, UNSPECIFIED TYPE: ICD-10-CM

## 2023-10-30 RX ORDER — LISINOPRIL 20 MG/1
20 TABLET ORAL DAILY
Qty: 90 TABLET | Refills: 1 | Status: SHIPPED | OUTPATIENT
Start: 2023-10-30

## 2023-12-05 ENCOUNTER — OFFICE VISIT (OUTPATIENT)
Dept: FAMILY MEDICINE CLINIC | Facility: CLINIC | Age: 53
End: 2023-12-05
Payer: COMMERCIAL

## 2023-12-05 ENCOUNTER — LAB (OUTPATIENT)
Dept: FAMILY MEDICINE CLINIC | Facility: CLINIC | Age: 53
End: 2023-12-05
Payer: COMMERCIAL

## 2023-12-05 VITALS
HEART RATE: 111 BPM | HEIGHT: 72 IN | WEIGHT: 263 LBS | SYSTOLIC BLOOD PRESSURE: 148 MMHG | RESPIRATION RATE: 20 BRPM | BODY MASS INDEX: 35.62 KG/M2 | OXYGEN SATURATION: 96 % | DIASTOLIC BLOOD PRESSURE: 96 MMHG

## 2023-12-05 DIAGNOSIS — I10 HYPERTENSION, UNSPECIFIED TYPE: ICD-10-CM

## 2023-12-05 DIAGNOSIS — F41.9 ANXIETY: ICD-10-CM

## 2023-12-05 DIAGNOSIS — K21.9 GASTROESOPHAGEAL REFLUX DISEASE, UNSPECIFIED WHETHER ESOPHAGITIS PRESENT: ICD-10-CM

## 2023-12-05 DIAGNOSIS — R79.89 LOW TESTOSTERONE: ICD-10-CM

## 2023-12-05 DIAGNOSIS — K75.81 NASH (NONALCOHOLIC STEATOHEPATITIS): ICD-10-CM

## 2023-12-05 DIAGNOSIS — Z00.00 ANNUAL PHYSICAL EXAM: Primary | ICD-10-CM

## 2023-12-05 DIAGNOSIS — G47.30 OBSERVED SLEEP APNEA: ICD-10-CM

## 2023-12-05 DIAGNOSIS — K50.019 CROHN'S DISEASE OF ILEUM WITH COMPLICATION: ICD-10-CM

## 2023-12-05 DIAGNOSIS — Z12.5 SCREENING PSA (PROSTATE SPECIFIC ANTIGEN): ICD-10-CM

## 2023-12-05 PROCEDURE — 84403 ASSAY OF TOTAL TESTOSTERONE: CPT | Performed by: FAMILY MEDICINE

## 2023-12-05 PROCEDURE — 84443 ASSAY THYROID STIM HORMONE: CPT | Performed by: FAMILY MEDICINE

## 2023-12-05 PROCEDURE — 80061 LIPID PANEL: CPT | Performed by: FAMILY MEDICINE

## 2023-12-05 PROCEDURE — G0103 PSA SCREENING: HCPCS | Performed by: FAMILY MEDICINE

## 2023-12-05 PROCEDURE — 82306 VITAMIN D 25 HYDROXY: CPT | Performed by: FAMILY MEDICINE

## 2023-12-05 PROCEDURE — 80053 COMPREHEN METABOLIC PANEL: CPT | Performed by: FAMILY MEDICINE

## 2023-12-05 PROCEDURE — 83036 HEMOGLOBIN GLYCOSYLATED A1C: CPT | Performed by: FAMILY MEDICINE

## 2023-12-05 PROCEDURE — 36415 COLL VENOUS BLD VENIPUNCTURE: CPT | Performed by: FAMILY MEDICINE

## 2023-12-05 PROCEDURE — 84439 ASSAY OF FREE THYROXINE: CPT | Performed by: FAMILY MEDICINE

## 2023-12-05 PROCEDURE — 85025 COMPLETE CBC W/AUTO DIFF WBC: CPT | Performed by: FAMILY MEDICINE

## 2023-12-05 PROCEDURE — 82607 VITAMIN B-12: CPT | Performed by: FAMILY MEDICINE

## 2023-12-05 RX ORDER — ALUMINUM ZIRCONIUM OCTACHLOROHYDREX GLY 16 G/100G
1 GEL TOPICAL DAILY
COMMUNITY

## 2023-12-05 RX ORDER — BUDESONIDE 3 MG/1
CAPSULE, COATED PELLETS ORAL
COMMUNITY
Start: 2023-10-26 | End: 2024-01-24

## 2023-12-05 NOTE — PROGRESS NOTES
Chief Complaint   Patient presents with    Annual Exam     HPI  Margarito Miles is a 53 y.o. male that presents for   Chief Complaint   Patient presents with    Annual Exam     Crohn's: diagnosed at 20yo (1991) s/p 3 colon resections w/ most recent 11/2020. Follows regularly w/ GI ([facility]) due to diarrhea/stool frequency. Last colonoscopy (10/2023) that revealed persistent inflammation. Skyrizi subsequently increased to q4 weeks. He has not noticed any difference w/ this change. Has already failed Stelara, Remicade and Humira. He reports multiple diarrhea stools/day at baseline but no recent abdominal pain or bloody stool. +Fatigue. He is maintained on B12 supplementation every 2 weeks (thru [facility]). Has cholestyramine for diarrhea but doesn't seem to help     MACDONALD: confirmed w/ prior biopsy. Mild elevation in liver enzymes but has been normal for the past 2 years. Maintained on milk thistle, Vit E, and Zinc.     GERD: maintained on esomeprazole 40 daily. No heartburn, reflux, dysphagia. 10/2021 EGD w/o abnormality. Well controlled     HTN: 148/96 today. Maintained on lisinopril 20 daily. No LH/dizziness, CP or SOB    PROMISE: maintained on Bipap but machine recently recalled. He reports this is mild and has been doing ok w/o machine. Scheduling titration study w/ sleep medicine- Gu[facility].     LowT: maintained on testosterone 200mg every 2 weeks. Last injection was 11/25/23. This has helped fatigue/energy somewhat. Happy on current supplemenation      Anxiety/depression: largely tied to chronic disease and work. Largely anxiety/stress. He feels things are well controlled and didn't feel that Buspar was helpful previously. Happy w/ current control.     Review of Systems   Constitutional:  Positive for fatigue. Negative for chills, fever and unexpected weight loss.   HENT:  Negative for congestion, rhinorrhea, sore throat and trouble swallowing.    Eyes:  Negative for visual disturbance.   Respiratory:  Negative for  cough and shortness of breath.    Cardiovascular:  Negative for chest pain and palpitations.   Gastrointestinal:  Positive for diarrhea. Negative for abdominal pain, blood in stool, constipation, nausea, vomiting and GERD.   Genitourinary:  Negative for difficulty urinating and dysuria.   Musculoskeletal:  Negative for arthralgias and joint swelling.   Skin:  Negative for rash and skin lesions.   Neurological:  Negative for dizziness, weakness, light-headedness and headache.   Psychiatric/Behavioral:  Negative for depressed mood. The patient is not nervous/anxious.      The following portions of the patient's history were reviewed and updated as appropriate: problem list, past medical history, past surgical history, allergies, current medications, past social history and past family history.    Problem List Tab  Patient History Tab  Immunizations Tab  Medications Tab  Chart Review Tab  Care Everywhere Tab  Synopsis Tab    PE  Vitals:    12/05/23 1447   BP: 148/96   Pulse: 111   Resp: 20   SpO2: 96%     Body mass index is 35.67 kg/m².  General: Obese, NAD  Head: AT/NC  Eyes: EOMI, anicteric sclera  ENT: MMM w/o erythema. TM clear bilaterally  Neck: Supple, no thyromegaly or LAD  Resp: CTAB, SCR, BS equal  CV: RRR w/o m/r/g; 2+ pulses  GI: Soft, NT/ND, +BS  MSK: FROM, no deformity, no edema  Skin: Warm, dry, intact  Neuro: Alert and oriented. No focal deficits  Psych: Appropriate mood and affect    Imaging  No Images in the past 120 days found..    Assessment & Plan   Margarito Miles is a 53 y.o. male that presents for   Chief Complaint   Patient presents with    Annual Exam     Diagnoses and all orders for this visit:    1. Annual physical exam (Primary)  -     CBC & Differential  -     Comprehensive Metabolic Panel  -     Hemoglobin A1c  -     Lipid Panel  -     TSH  -     T4, Free  -     Vitamin D,25-Hydroxy  -     Vitamin B12  -     PSA Screen  -     Pneumococcal Conjugate Vaccine 20-Valent All  -  Counseled  regarding diet, exercise, weight loss, and preventative health maintenance items/immunizations below    2. Crohn's disease of ileum with complication: diagnosed at 22yo (1991) s/p 3 colon resections w/ most recent 11/2020. Last colonoscopy 10/2023- revealed persistent inflammation   - Cont GI f/u- [facility]   - Cont home Skyrizi 360mg q4 weeks and budesonide daily per GI   - Cont B12 supplementation     3. MACDONALD (nonalcoholic steatohepatitis): confirmed w/ prior biopsy  -     Comprehensive Metabolic Panel  - Ok to continue home supplements  - Counseled regarding diet, exercise, weight loss    4. Gastroesophageal reflux disease, unspecified whether esophagitis present   - Cont home Nexium 40mg daily    5. Hypertension, unspecified type: 148/96 today   - Cont lisinopril 20mg daily   - Counseled regarding increasing medication but patient deferred. Will have him monitor BP at home/work and call if persistently >140/90    6. Observed sleep apnea   - Cont home BiPAP    7. Low testosterone  -     Testosterone  - Cont hoem testosterone 200mg IM q2 weeks    8. Anxiety   - Monitor. Recommend regular exercise    9. Screening PSA (prostate specific antigen)  -     PSA Screen    Check BP at work  Plan to increase lisinopril to 40 if BP remains >140/90    Preventative  Colonoscopy: 10/2023- gets every 1-2 years 2/2 Crohn's  PSA: 0.62 (11/2022), ordered today  AAA: due 2037  Shingles: Shingrix 2/2023  Pneumonia: Pneumovax 1/2017, PCV20 ordered today  Tdap: 11/2021  Influenza: 10/2023  COVID: Completed 3 shots Pfizer (8/2021) and had illness       Return in about 1 year (around 12/5/2024) for Annual physical.

## 2023-12-06 LAB
25(OH)D3 SERPL-MCNC: 40.8 NG/ML (ref 30–100)
ALBUMIN SERPL-MCNC: 4.7 G/DL (ref 3.5–5.2)
ALBUMIN/GLOB SERPL: 1.5 G/DL
ALP SERPL-CCNC: 57 U/L (ref 39–117)
ALT SERPL W P-5'-P-CCNC: 51 U/L (ref 1–41)
ANION GAP SERPL CALCULATED.3IONS-SCNC: 12.5 MMOL/L (ref 5–15)
AST SERPL-CCNC: 35 U/L (ref 1–40)
BASOPHILS # BLD AUTO: 0.03 10*3/MM3 (ref 0–0.2)
BASOPHILS NFR BLD AUTO: 0.4 % (ref 0–1.5)
BILIRUB SERPL-MCNC: 1.9 MG/DL (ref 0–1.2)
BUN SERPL-MCNC: 10 MG/DL (ref 6–20)
BUN/CREAT SERPL: 8.2 (ref 7–25)
CALCIUM SPEC-SCNC: 9.5 MG/DL (ref 8.6–10.5)
CHLORIDE SERPL-SCNC: 101 MMOL/L (ref 98–107)
CHOLEST SERPL-MCNC: 161 MG/DL (ref 0–200)
CO2 SERPL-SCNC: 26.5 MMOL/L (ref 22–29)
CREAT SERPL-MCNC: 1.22 MG/DL (ref 0.76–1.27)
DEPRECATED RDW RBC AUTO: 45.5 FL (ref 37–54)
EGFRCR SERPLBLD CKD-EPI 2021: 71.8 ML/MIN/1.73
EOSINOPHIL # BLD AUTO: 0.07 10*3/MM3 (ref 0–0.4)
EOSINOPHIL NFR BLD AUTO: 0.9 % (ref 0.3–6.2)
ERYTHROCYTE [DISTWIDTH] IN BLOOD BY AUTOMATED COUNT: 14.3 % (ref 12.3–15.4)
GLOBULIN UR ELPH-MCNC: 3.1 GM/DL
GLUCOSE SERPL-MCNC: 84 MG/DL (ref 65–99)
HBA1C MFR BLD: 5.3 % (ref 4.8–5.6)
HCT VFR BLD AUTO: 44.5 % (ref 37.5–51)
HDLC SERPL-MCNC: 37 MG/DL (ref 40–60)
HGB BLD-MCNC: 15.3 G/DL (ref 13–17.7)
IMM GRANULOCYTES # BLD AUTO: 0.02 10*3/MM3 (ref 0–0.05)
IMM GRANULOCYTES NFR BLD AUTO: 0.3 % (ref 0–0.5)
LDLC SERPL CALC-MCNC: 67 MG/DL (ref 0–100)
LDLC/HDLC SERPL: 1.4 {RATIO}
LYMPHOCYTES # BLD AUTO: 1.83 10*3/MM3 (ref 0.7–3.1)
LYMPHOCYTES NFR BLD AUTO: 24 % (ref 19.6–45.3)
MCH RBC QN AUTO: 30.7 PG (ref 26.6–33)
MCHC RBC AUTO-ENTMCNC: 34.4 G/DL (ref 31.5–35.7)
MCV RBC AUTO: 89.2 FL (ref 79–97)
MONOCYTES # BLD AUTO: 0.76 10*3/MM3 (ref 0.1–0.9)
MONOCYTES NFR BLD AUTO: 10 % (ref 5–12)
NEUTROPHILS NFR BLD AUTO: 4.91 10*3/MM3 (ref 1.7–7)
NEUTROPHILS NFR BLD AUTO: 64.4 % (ref 42.7–76)
NRBC BLD AUTO-RTO: 0 /100 WBC (ref 0–0.2)
PLATELET # BLD AUTO: 208 10*3/MM3 (ref 140–450)
PMV BLD AUTO: 10.4 FL (ref 6–12)
POTASSIUM SERPL-SCNC: 4 MMOL/L (ref 3.5–5.2)
PROT SERPL-MCNC: 7.8 G/DL (ref 6–8.5)
PSA SERPL-MCNC: 0.74 NG/ML (ref 0–4)
RBC # BLD AUTO: 4.99 10*6/MM3 (ref 4.14–5.8)
SODIUM SERPL-SCNC: 140 MMOL/L (ref 136–145)
T4 FREE SERPL-MCNC: 1.87 NG/DL (ref 0.93–1.7)
TESTOST SERPL-MCNC: 686 NG/DL (ref 193–740)
TRIGL SERPL-MCNC: 361 MG/DL (ref 0–150)
TSH SERPL DL<=0.05 MIU/L-ACNC: 3.52 UIU/ML (ref 0.27–4.2)
VIT B12 BLD-MCNC: 1208 PG/ML (ref 211–946)
VLDLC SERPL-MCNC: 57 MG/DL (ref 5–40)
WBC NRBC COR # BLD AUTO: 7.62 10*3/MM3 (ref 3.4–10.8)

## 2024-01-16 DIAGNOSIS — I10 HYPERTENSION, UNSPECIFIED TYPE: ICD-10-CM

## 2024-01-16 RX ORDER — LISINOPRIL 40 MG/1
40 TABLET ORAL DAILY
Qty: 90 TABLET | Refills: 1 | Status: SHIPPED | OUTPATIENT
Start: 2024-01-16

## 2024-03-05 DIAGNOSIS — I10 HYPERTENSION, UNSPECIFIED TYPE: ICD-10-CM

## 2024-03-05 DIAGNOSIS — R79.89 LOW TESTOSTERONE IN MALE: ICD-10-CM

## 2024-03-05 RX ORDER — TESTOSTERONE CYPIONATE 200 MG/ML
200 INJECTION, SOLUTION INTRAMUSCULAR
Qty: 5 ML | OUTPATIENT
Start: 2024-03-05

## 2024-03-05 RX ORDER — TESTOSTERONE CYPIONATE 200 MG/ML
200 INJECTION, SOLUTION INTRAMUSCULAR
Qty: 5 ML | Refills: 2 | Status: SHIPPED | OUTPATIENT
Start: 2024-03-05

## 2024-03-05 RX ORDER — LISINOPRIL 20 MG/1
20 TABLET ORAL DAILY
Qty: 90 TABLET | Refills: 1 | OUTPATIENT
Start: 2024-03-05

## 2024-03-13 DIAGNOSIS — I10 HYPERTENSION, UNSPECIFIED TYPE: ICD-10-CM

## 2024-03-13 RX ORDER — LISINOPRIL 40 MG/1
40 TABLET ORAL DAILY
Qty: 90 TABLET | Refills: 1 | Status: SHIPPED | OUTPATIENT
Start: 2024-03-13

## 2024-07-18 DIAGNOSIS — I10 HYPERTENSION, UNSPECIFIED TYPE: ICD-10-CM

## 2024-07-18 RX ORDER — LISINOPRIL 40 MG/1
40 TABLET ORAL DAILY
Qty: 90 TABLET | Refills: 1 | Status: SHIPPED | OUTPATIENT
Start: 2024-07-18

## 2024-08-29 ENCOUNTER — OFFICE VISIT (OUTPATIENT)
Dept: FAMILY MEDICINE CLINIC | Facility: CLINIC | Age: 54
End: 2024-08-29
Payer: COMMERCIAL

## 2024-08-29 VITALS
SYSTOLIC BLOOD PRESSURE: 152 MMHG | HEIGHT: 72 IN | BODY MASS INDEX: 32.1 KG/M2 | WEIGHT: 237 LBS | OXYGEN SATURATION: 95 % | HEART RATE: 105 BPM | DIASTOLIC BLOOD PRESSURE: 84 MMHG | RESPIRATION RATE: 16 BRPM

## 2024-08-29 DIAGNOSIS — I10 HYPERTENSION, UNSPECIFIED TYPE: Primary | ICD-10-CM

## 2024-08-29 DIAGNOSIS — R00.0 TACHYCARDIA: ICD-10-CM

## 2024-08-29 PROCEDURE — 99214 OFFICE O/P EST MOD 30 MIN: CPT | Performed by: FAMILY MEDICINE

## 2024-08-29 PROCEDURE — 93000 ELECTROCARDIOGRAM COMPLETE: CPT | Performed by: FAMILY MEDICINE

## 2024-08-29 RX ORDER — CARVEDILOL 3.12 MG/1
3.12 TABLET ORAL 2 TIMES DAILY WITH MEALS
Qty: 180 TABLET | Refills: 1 | Status: SHIPPED | OUTPATIENT
Start: 2024-08-29

## 2024-08-29 NOTE — PROGRESS NOTES
Chief Complaint   Patient presents with    Hypertension     HPI  Margarito Miles is a 54 y.o. male that presents for   Chief Complaint   Patient presents with    Hypertension     HTN: 152/84 today w/ . Reports he generally runs 150/90. Maintained on lisinopril 40mg daily. No LH/dizziness, CP or SOB    Review of Systems  Pertinent positives of ROS documented in HPI    The following portions of the patient's history were reviewed and updated as appropriate: problem list, past medical history, past surgical history, allergies, current medication    Problem List Tab  Patient History Tab  Immunizations Tab  Medications Tab  Chart Review Tab  Care Everywhere Tab  Synopsis Tab    PE  Vitals:    08/29/24 1442   BP: 152/84   Pulse: 105   Resp: 16   SpO2: 95%     Body mass index is 32.14 kg/m².  General: Obese, NAD  Head: AT/NC  Eyes: EOMI, anicteric sclera  Resp: CTAB, SCR, BS equal  CV: Tachycardic w/ RR. No m/r/g; 2+ pulses  GI: Soft, NT/ND, +BS  MSK: FROM, no deformity, no edema  Skin: Warm, dry, intact  Neuro: Alert and oriented. No focal deficits  Psych: Appropriate mood and affect    Imaging  No Images in the past 120 days found..    Assessment & Plan   Margarito Miles is a 54 y.o. male that presents for   Chief Complaint   Patient presents with    Hypertension     Diagnoses and all orders for this visit:    1. Hypertension, unspecified type (Primary): 152/84 today  -     Start carvedilol (Coreg) 3.125 MG tablet; Take 1 tablet by mouth 2 (Two) Times a Day With Meals.  Dispense: 180 tablet; Refill: 1  - Cont home lisinopril 40mg daily    2. Tachycardia:  today  -     ECG 12 Lead- NSR  -     Start carvedilol (Coreg) 3.125 MG tablet; Take 1 tablet by mouth 2 (Two) Times a Day With Meals.  Dispense: 180 tablet; Refill: 1       Return in about 15 weeks (around 12/12/2024) for Annual physical.

## 2024-09-16 DIAGNOSIS — R00.0 TACHYCARDIA: ICD-10-CM

## 2024-09-16 DIAGNOSIS — I10 HYPERTENSION, UNSPECIFIED TYPE: ICD-10-CM

## 2024-09-16 RX ORDER — CARVEDILOL 6.25 MG/1
6.25 TABLET ORAL 2 TIMES DAILY WITH MEALS
Qty: 60 TABLET | Refills: 5 | Status: SHIPPED | OUTPATIENT
Start: 2024-09-16

## 2024-10-07 DIAGNOSIS — R79.89 LOW TESTOSTERONE IN MALE: ICD-10-CM

## 2024-10-08 RX ORDER — TESTOSTERONE CYPIONATE 200 MG/ML
INJECTION, SOLUTION INTRAMUSCULAR
Qty: 5 ML | Refills: 3 | Status: SHIPPED | OUTPATIENT
Start: 2024-10-08

## 2025-01-22 DIAGNOSIS — R00.0 TACHYCARDIA: ICD-10-CM

## 2025-01-22 DIAGNOSIS — I10 HYPERTENSION, UNSPECIFIED TYPE: ICD-10-CM

## 2025-01-22 RX ORDER — LISINOPRIL 40 MG/1
40 TABLET ORAL DAILY
Qty: 90 TABLET | Refills: 1 | Status: SHIPPED | OUTPATIENT
Start: 2025-01-22

## 2025-01-22 RX ORDER — CARVEDILOL 6.25 MG/1
6.25 TABLET ORAL 2 TIMES DAILY
Qty: 180 TABLET | Refills: 1 | Status: SHIPPED | OUTPATIENT
Start: 2025-01-22

## 2025-03-10 ENCOUNTER — LAB (OUTPATIENT)
Dept: LAB | Facility: HOSPITAL | Age: 55
End: 2025-03-10
Payer: COMMERCIAL

## 2025-03-10 ENCOUNTER — TRANSCRIBE ORDERS (OUTPATIENT)
Dept: ADMINISTRATIVE | Facility: HOSPITAL | Age: 55
End: 2025-03-10
Payer: COMMERCIAL

## 2025-03-10 DIAGNOSIS — K50.90 CROHN'S DISEASE WITHOUT COMPLICATION, UNSPECIFIED GASTROINTESTINAL TRACT LOCATION: Primary | ICD-10-CM

## 2025-03-10 DIAGNOSIS — K50.90 CROHN'S DISEASE WITHOUT COMPLICATION, UNSPECIFIED GASTROINTESTINAL TRACT LOCATION: ICD-10-CM

## 2025-03-10 LAB
HBV SURFACE AB SER RIA-ACNC: REACTIVE
HBV SURFACE AG SERPL QL IA: NORMAL

## 2025-03-10 PROCEDURE — 86704 HEP B CORE ANTIBODY TOTAL: CPT

## 2025-03-10 PROCEDURE — 86706 HEP B SURFACE ANTIBODY: CPT

## 2025-03-10 PROCEDURE — 36415 COLL VENOUS BLD VENIPUNCTURE: CPT

## 2025-03-10 PROCEDURE — 87340 HEPATITIS B SURFACE AG IA: CPT

## 2025-03-11 LAB — HBV CORE AB SERPL QL IA: NEGATIVE

## 2025-03-31 ENCOUNTER — OFFICE VISIT (OUTPATIENT)
Dept: FAMILY MEDICINE CLINIC | Facility: CLINIC | Age: 55
End: 2025-03-31
Payer: COMMERCIAL

## 2025-03-31 ENCOUNTER — LAB (OUTPATIENT)
Dept: FAMILY MEDICINE CLINIC | Facility: CLINIC | Age: 55
End: 2025-03-31
Payer: COMMERCIAL

## 2025-03-31 VITALS
SYSTOLIC BLOOD PRESSURE: 140 MMHG | RESPIRATION RATE: 18 BRPM | BODY MASS INDEX: 31.83 KG/M2 | WEIGHT: 235 LBS | HEIGHT: 72 IN | DIASTOLIC BLOOD PRESSURE: 88 MMHG | HEART RATE: 113 BPM | OXYGEN SATURATION: 96 %

## 2025-03-31 DIAGNOSIS — G47.30 OBSERVED SLEEP APNEA: ICD-10-CM

## 2025-03-31 DIAGNOSIS — I10 HYPERTENSION, UNSPECIFIED TYPE: ICD-10-CM

## 2025-03-31 DIAGNOSIS — R79.89 LOW TESTOSTERONE: ICD-10-CM

## 2025-03-31 DIAGNOSIS — K50.019 CROHN'S DISEASE OF ILEUM WITH COMPLICATION: ICD-10-CM

## 2025-03-31 DIAGNOSIS — F41.9 ANXIETY: ICD-10-CM

## 2025-03-31 DIAGNOSIS — R00.0 TACHYCARDIA: ICD-10-CM

## 2025-03-31 DIAGNOSIS — K21.9 GASTROESOPHAGEAL REFLUX DISEASE, UNSPECIFIED WHETHER ESOPHAGITIS PRESENT: ICD-10-CM

## 2025-03-31 DIAGNOSIS — Z12.5 SCREENING PSA (PROSTATE SPECIFIC ANTIGEN): ICD-10-CM

## 2025-03-31 DIAGNOSIS — K75.81 NASH (NONALCOHOLIC STEATOHEPATITIS): ICD-10-CM

## 2025-03-31 DIAGNOSIS — Z00.00 ANNUAL PHYSICAL EXAM: Primary | ICD-10-CM

## 2025-03-31 PROCEDURE — G0103 PSA SCREENING: HCPCS | Performed by: FAMILY MEDICINE

## 2025-03-31 PROCEDURE — 80061 LIPID PANEL: CPT | Performed by: FAMILY MEDICINE

## 2025-03-31 PROCEDURE — 82306 VITAMIN D 25 HYDROXY: CPT | Performed by: FAMILY MEDICINE

## 2025-03-31 PROCEDURE — 84439 ASSAY OF FREE THYROXINE: CPT | Performed by: FAMILY MEDICINE

## 2025-03-31 PROCEDURE — 85652 RBC SED RATE AUTOMATED: CPT | Performed by: FAMILY MEDICINE

## 2025-03-31 PROCEDURE — 82607 VITAMIN B-12: CPT | Performed by: FAMILY MEDICINE

## 2025-03-31 PROCEDURE — 84403 ASSAY OF TOTAL TESTOSTERONE: CPT | Performed by: FAMILY MEDICINE

## 2025-03-31 PROCEDURE — 83036 HEMOGLOBIN GLYCOSYLATED A1C: CPT | Performed by: FAMILY MEDICINE

## 2025-03-31 PROCEDURE — 99396 PREV VISIT EST AGE 40-64: CPT | Performed by: FAMILY MEDICINE

## 2025-03-31 PROCEDURE — 36415 COLL VENOUS BLD VENIPUNCTURE: CPT | Performed by: FAMILY MEDICINE

## 2025-03-31 PROCEDURE — 86140 C-REACTIVE PROTEIN: CPT | Performed by: FAMILY MEDICINE

## 2025-03-31 PROCEDURE — 80050 GENERAL HEALTH PANEL: CPT | Performed by: FAMILY MEDICINE

## 2025-03-31 RX ORDER — ESCITALOPRAM OXALATE 10 MG/1
10 TABLET ORAL DAILY
Qty: 90 TABLET | Refills: 1 | Status: SHIPPED | OUTPATIENT
Start: 2025-03-31

## 2025-03-31 RX ORDER — CARVEDILOL 12.5 MG/1
12.5 TABLET ORAL 2 TIMES DAILY
Qty: 180 TABLET | Refills: 1 | Status: SHIPPED | OUTPATIENT
Start: 2025-03-31

## 2025-03-31 NOTE — PROGRESS NOTES
Chief Complaint   Patient presents with    Annual Exam     HPI  Margarito Miles is a 54 y.o. male that presents for   Chief Complaint   Patient presents with    Annual Exam     Crohn's: diagnosed at 22yo (1991) s/p 3 colon resections w/ most recent 11/2020. Follows regularly w/ GI ([facility]) due to diarrhea/stool frequency. Last colonoscopy (10/2023) that revealed persistent inflammation. Maintained on Skyrizi q4 weeks and Entyvio q2 weeks (just added 11/2024). Reports some nausea on these meds. Has already failed Stelara, Remicade and Humira. He reports multiple diarrhea stools/day at baseline but no recent abdominal pain or bloody stool. Reports nausea and fatigue slightly improved on current medication regimen. He is maintained on B12 supplementation every 2 weeks (thru [facility]). Has cholestyramine for diarrhea but doesn't seem to help     MACDONALD: confirmed w/ prior biopsy. Last fibroscan was 2023. Mild elevation in liver enzymes but has been normal for the past few years. Maintained on milk thistle, Vit E, and Zinc.     GERD: maintained on esomeprazole 40 daily. No heartburn, reflux, dysphagia. 10/2021 EGD w/o abnormality. Well controlled     HTN: 140/88 today. Maintained on Coreg 6.25 BID and lisinopril 40 daily. No LH/dizziness, CP or SOB     Tachycardia:  today. Reports resting HR 80s. Patient reports frequently being elevated during work week and attributed (at least partially) to caffeine use)    PROMISE: maintained on Bipap. Doing ok w/o machine. Follows w/ sleep medicine- [facility].     LowT: maintained on testosterone 200mg every 2 weeks. Last injection was 3/18/25. Not sure it has helped w/ fatigue/energy. Does seem to help w/ muscle mass/strength    Anxiety: not maintained on any medication. Reports notable stress related to work. Would be interested in trying a medication. Took Buspar previously w/o notable benefit.     Hx childhood asthma that rarely flares    Review of Systems  Pertinent positives  of ROS documented in HPI    The following portions of the patient's history were reviewed and updated as appropriate: problem list, past medical history, past surgical history, allergies, current medications, past social history and past family history.    Problem List Tab  Patient History Tab  Immunizations Tab  Medications Tab  Chart Review Tab  Care Everywhere Tab  Synopsis Tab    PE  Vitals:    03/31/25 1339   BP: 140/88   Pulse: 113   Resp: 18   SpO2: 96%     Body mass index is 31.87 kg/m².  General: Obese, NAD  Head: AT/NC  Eyes: EOMI, anicteric sclera  ENT: MMM w/o erythema. TM clear bilaterally  Neck: Supple, no thyromegaly or LAD. No carotid bruit  Resp: CTAB, SCR, BS equal  CV: Tachycardic w/ RR. No m/r/g; 2+ pulses  GI: Soft, NT/ND, +BS  MSK: FROM, no deformity, no edema  Skin: Warm, dry, intact  Neuro: Alert and oriented. No focal deficits  Psych: Appropriate mood and affect    Imaging  No Images in the past 120 days found..    Assessment & Plan   Margarito Miles is a 54 y.o. male that presents for   Chief Complaint   Patient presents with    Annual Exam     Diagnoses and all orders for this visit:    1. Annual physical exam (Primary)  -     CBC & Differential  -     Comprehensive Metabolic Panel  -     Hemoglobin A1c  -     Lipid Panel  -     TSH  -     T4, Free  -     Vitamin D,25-Hydroxy  -     Vitamin B12  -     PSA Screen  -  Counseled regarding diet, exercise, weight loss, and preventative health maintenance items/immunizations below    2. Crohn's disease of ileum with complication  -     C-reactive Protein  -     Sedimentation Rate  - Cont home Skyrizi q4 weeks and Entyvio q2 weeks  - Cont GI f/u- [facility] ([facility])    3. MACDONALD (nonalcoholic steatohepatitis)  -     Comprehensive Metabolic Panel  - Cont weight loss efforts    4. Gastroesophageal reflux disease, unspecified whether esophagitis present   - Cont home esomeprazole 40mg daily    5. Hypertension, unspecified type: 140/88 today  -      Increase carvedilol (COREG) 12.5 MG tablet; Take 1 tablet by mouth 2 (Two) Times a Day.  Dispense: 180 tablet; Refill: 1  - Cont home lisinopril 40mg daily    6. Tachycardia:  today  -     Increase carvedilol (COREG) 12.5 MG tablet; Take 1 tablet by mouth 2 (Two) Times a Day.  Dispense: 180 tablet; Refill: 1  - Reduce caffeine as able    7. Observed sleep apnea   - Cont home BiPAP nightly   - Cont sleep med f/u- [facility]    8. Low testosterone  -     CBC & Differential  -     PSA Screen  -     Testosterone  - Cont home testosterone 200mg q2 weeks pending labs    9. Anxiety  -     Start escitalopram (Lexapro) 10 MG tablet; Take 1 tablet by mouth Daily.  Dispense: 90 tablet; Refill: 1    10. Screening PSA (prostate specific antigen)  -     PSA Screen    Preventative  Colonoscopy: 11/2024- gets every 1-2 years 2/2 Crohn's  PSA: 0.741 (12/2023), ordered today  AAA: due 2037  Shingles: Shingrix 2/2023  Pneumonia: Pneumovax 1/2017, PCV20 12/2023  Tdap: 11/2021  Influenza: 10/2024  COVID: Completed 3 shots Pfizer (8/2021) and had illness       Return in about 6 months (around 9/30/2025) for Recheck- 30min- HTN/HR, lowT.

## 2025-04-01 LAB
25(OH)D3 SERPL-MCNC: 34.1 NG/ML (ref 30–100)
ALBUMIN SERPL-MCNC: 4.3 G/DL (ref 3.5–5.2)
ALBUMIN/GLOB SERPL: 1.3 G/DL
ALP SERPL-CCNC: 54 U/L (ref 39–117)
ALT SERPL W P-5'-P-CCNC: 84 U/L (ref 1–41)
ANION GAP SERPL CALCULATED.3IONS-SCNC: 14.1 MMOL/L (ref 5–15)
AST SERPL-CCNC: 180 U/L (ref 1–40)
BASOPHILS # BLD AUTO: 0.05 10*3/MM3 (ref 0–0.2)
BASOPHILS NFR BLD AUTO: 0.6 % (ref 0–1.5)
BILIRUB SERPL-MCNC: 1.7 MG/DL (ref 0–1.2)
BUN SERPL-MCNC: 15 MG/DL (ref 6–20)
BUN/CREAT SERPL: 10.6 (ref 7–25)
CALCIUM SPEC-SCNC: 9.4 MG/DL (ref 8.6–10.5)
CHLORIDE SERPL-SCNC: 103 MMOL/L (ref 98–107)
CHOLEST SERPL-MCNC: 143 MG/DL (ref 0–200)
CO2 SERPL-SCNC: 21.9 MMOL/L (ref 22–29)
CREAT SERPL-MCNC: 1.41 MG/DL (ref 0.76–1.27)
CRP SERPL-MCNC: <0.3 MG/DL (ref 0–0.5)
DEPRECATED RDW RBC AUTO: 41.8 FL (ref 37–54)
EGFRCR SERPLBLD CKD-EPI 2021: 60 ML/MIN/1.73
EOSINOPHIL # BLD AUTO: 0.11 10*3/MM3 (ref 0–0.4)
EOSINOPHIL NFR BLD AUTO: 1.3 % (ref 0.3–6.2)
ERYTHROCYTE [DISTWIDTH] IN BLOOD BY AUTOMATED COUNT: 13 % (ref 12.3–15.4)
ERYTHROCYTE [SEDIMENTATION RATE] IN BLOOD: 4 MM/HR (ref 0–20)
GLOBULIN UR ELPH-MCNC: 3.2 GM/DL
GLUCOSE SERPL-MCNC: 83 MG/DL (ref 65–99)
HBA1C MFR BLD: 4.9 % (ref 4.8–5.6)
HCT VFR BLD AUTO: 48.1 % (ref 37.5–51)
HDLC SERPL-MCNC: 32 MG/DL (ref 40–60)
HGB BLD-MCNC: 17.1 G/DL (ref 13–17.7)
IMM GRANULOCYTES # BLD AUTO: 0.04 10*3/MM3 (ref 0–0.05)
IMM GRANULOCYTES NFR BLD AUTO: 0.5 % (ref 0–0.5)
LDL/HDL RATIO NULL: ABNORMAL
LDLC SERPL CALC-MCNC: 29 MG/DL (ref 0–100)
LYMPHOCYTES # BLD AUTO: 1.77 10*3/MM3 (ref 0.7–3.1)
LYMPHOCYTES NFR BLD AUTO: 20.3 % (ref 19.6–45.3)
MCH RBC QN AUTO: 31.5 PG (ref 26.6–33)
MCHC RBC AUTO-ENTMCNC: 35.6 G/DL (ref 31.5–35.7)
MCV RBC AUTO: 88.7 FL (ref 79–97)
MONOCYTES # BLD AUTO: 0.83 10*3/MM3 (ref 0.1–0.9)
MONOCYTES NFR BLD AUTO: 9.5 % (ref 5–12)
NEUTROPHILS NFR BLD AUTO: 5.94 10*3/MM3 (ref 1.7–7)
NEUTROPHILS NFR BLD AUTO: 67.8 % (ref 42.7–76)
NRBC BLD AUTO-RTO: 0 /100 WBC (ref 0–0.2)
PLATELET # BLD AUTO: 234 10*3/MM3 (ref 140–450)
PMV BLD AUTO: 12 FL (ref 6–12)
POTASSIUM SERPL-SCNC: 4.2 MMOL/L (ref 3.5–5.2)
PROT SERPL-MCNC: 7.5 G/DL (ref 6–8.5)
PSA SERPL-MCNC: 0.92 NG/ML (ref 0–4)
RBC # BLD AUTO: 5.42 10*6/MM3 (ref 4.14–5.8)
SODIUM SERPL-SCNC: 139 MMOL/L (ref 136–145)
T4 FREE SERPL-MCNC: 1.61 NG/DL (ref 0.92–1.68)
TESTOST SERPL-MCNC: 445 NG/DL (ref 193–740)
TRIGL SERPL-MCNC: 606 MG/DL (ref 0–150)
TSH SERPL DL<=0.05 MIU/L-ACNC: 2.39 UIU/ML (ref 0.27–4.2)
VIT B12 BLD-MCNC: 1566 PG/ML (ref 211–946)
VLDLC SERPL-MCNC: 82 MG/DL (ref 5–40)
WBC NRBC COR # BLD AUTO: 8.74 10*3/MM3 (ref 3.4–10.8)

## 2025-04-08 ENCOUNTER — LAB (OUTPATIENT)
Dept: LAB | Facility: HOSPITAL | Age: 55
End: 2025-04-08
Payer: COMMERCIAL

## 2025-04-08 DIAGNOSIS — R79.89 ABNORMAL LIVER FUNCTION TESTS: ICD-10-CM

## 2025-04-08 LAB
ALBUMIN SERPL-MCNC: 4.5 G/DL (ref 3.5–5.2)
ALBUMIN/GLOB SERPL: 1.5 G/DL
ALP SERPL-CCNC: 58 U/L (ref 39–117)
ALT SERPL W P-5'-P-CCNC: 77 U/L (ref 1–41)
ANION GAP SERPL CALCULATED.3IONS-SCNC: 10.3 MMOL/L (ref 5–15)
AST SERPL-CCNC: 47 U/L (ref 1–40)
BILIRUB SERPL-MCNC: 1.3 MG/DL (ref 0–1.2)
BUN SERPL-MCNC: 16 MG/DL (ref 6–20)
BUN/CREAT SERPL: 12.5 (ref 7–25)
CALCIUM SPEC-SCNC: 9.5 MG/DL (ref 8.6–10.5)
CHLORIDE SERPL-SCNC: 101 MMOL/L (ref 98–107)
CO2 SERPL-SCNC: 25.7 MMOL/L (ref 22–29)
CREAT SERPL-MCNC: 1.28 MG/DL (ref 0.76–1.27)
EGFRCR SERPLBLD CKD-EPI 2021: 67.3 ML/MIN/1.73
GLOBULIN UR ELPH-MCNC: 3 GM/DL
GLUCOSE SERPL-MCNC: 83 MG/DL (ref 65–99)
POTASSIUM SERPL-SCNC: 4.4 MMOL/L (ref 3.5–5.2)
PROT SERPL-MCNC: 7.5 G/DL (ref 6–8.5)
SODIUM SERPL-SCNC: 137 MMOL/L (ref 136–145)

## 2025-04-08 PROCEDURE — 80053 COMPREHEN METABOLIC PANEL: CPT

## 2025-04-08 PROCEDURE — 36415 COLL VENOUS BLD VENIPUNCTURE: CPT

## 2025-04-29 DIAGNOSIS — R79.89 LOW TESTOSTERONE IN MALE: ICD-10-CM

## 2025-04-29 RX ORDER — TESTOSTERONE CYPIONATE 200 MG/ML
200 INJECTION, SOLUTION INTRAMUSCULAR
Qty: 6 ML | Refills: 1 | Status: SHIPPED | OUTPATIENT
Start: 2025-04-29

## 2025-05-21 ENCOUNTER — LAB (OUTPATIENT)
Dept: LAB | Facility: HOSPITAL | Age: 55
End: 2025-05-21
Payer: COMMERCIAL

## 2025-05-21 ENCOUNTER — TRANSCRIBE ORDERS (OUTPATIENT)
Dept: ADMINISTRATIVE | Facility: HOSPITAL | Age: 55
End: 2025-05-21
Payer: COMMERCIAL

## 2025-05-21 DIAGNOSIS — K50.919: ICD-10-CM

## 2025-05-21 DIAGNOSIS — K50.919: Primary | ICD-10-CM

## 2025-05-21 PROCEDURE — 36415 COLL VENOUS BLD VENIPUNCTURE: CPT

## 2025-05-21 PROCEDURE — 86480 TB TEST CELL IMMUN MEASURE: CPT

## 2025-05-23 LAB
GAMMA INTERFERON BACKGROUND BLD IA-ACNC: 0.14 IU/ML
M TB IFN-G BLD-IMP: NEGATIVE
M TB IFN-G CD4+ BCKGRND COR BLD-ACNC: 0.2 IU/ML
M TB IFN-G CD4+CD8+ BCKGRND COR BLD-ACNC: 0.21 IU/ML
MITOGEN IGNF BCKGRD COR BLD-ACNC: >10 IU/ML
QUANTIFERON INCUBATION: NORMAL
SERVICE CMNT-IMP: NORMAL

## (undated) DEVICE — GLV SURG BIOGEL LTX PF 6

## (undated) DEVICE — JACKSON-PRATT 100CC BULB RESERVOIR: Brand: CARDINAL HEALTH

## (undated) DEVICE — BITEBLOCK OMNI BLOC

## (undated) DEVICE — DRSNG WND GZ PAD BORDERED 4X8IN STRL

## (undated) DEVICE — GLV SURG PREMIERPRO ORTHO LTX PF SZ7.5 BRN

## (undated) DEVICE — ECHELON FLEX 60 ARTICULATING ENDOSCOPIC LINEAR CUTTER (NO CARTRIDGE): Brand: ECHELON FLEX ENDOPATH

## (undated) DEVICE — DRSNG WND BORDR/ADHS NONADHR/GZ LF 4X14IN STRL

## (undated) DEVICE — PK PROC MAJ 40

## (undated) DEVICE — SKIN PREP TRAY W/CHG: Brand: MEDLINE INDUSTRIES, INC.

## (undated) DEVICE — SYR LUERLOK 30CC

## (undated) DEVICE — KT ORCA ORCAPOD DISP STRL

## (undated) DEVICE — Device

## (undated) DEVICE — HARMONIC ACE +7 LAPAROSCOPIC SHEARS ADVANCED HEMOSTASIS 5MM DIAMETER 36CM SHAFT LENGTH  FOR USE WITH GRAY HAND PIECE ONLY: Brand: HARMONIC ACE

## (undated) DEVICE — TOTAL TRAY, 16FR 10ML SIL FOLEY, URN: Brand: MEDLINE

## (undated) DEVICE — ENDOPATH XCEL UNIVERSAL TROCAR STABLILITY SLEEVES: Brand: ENDOPATH XCEL

## (undated) DEVICE — SUT VIC 3/0 TIES 18IN J110T

## (undated) DEVICE — ELECTRD BLD EZ CLN MOD XLNG 2.75IN

## (undated) DEVICE — NDL HYPO ECLPS SFTY 22G 1 1/2IN

## (undated) DEVICE — VIOLET BRAIDED (POLYGLACTIN 910), SYNTHETIC ABSORBABLE SUTURE: Brand: COATED VICRYL

## (undated) DEVICE — PREP SOL POVIDONE/IODINE BT 4OZ

## (undated) DEVICE — ANTIBACTERIAL UNDYED BRAIDED (POLYGLACTIN 910), SYNTHETIC ABSORBABLE SUTURE: Brand: COATED VICRYL

## (undated) DEVICE — PENCL ES MEGADINE EZ/CLEAN BUTN W/HOLSTR 10FT

## (undated) DEVICE — BARRIER, ABSORBABLE, ADHESION: Brand: SEPRAFILM®

## (undated) DEVICE — SUT VIC 5/0 PS2 18IN J495H

## (undated) DEVICE — SINGLE-USE BIOPSY FORCEPS: Brand: RADIAL JAW 4

## (undated) DEVICE — COUNT NDL MAG FM/BLCK 40COUNT

## (undated) DEVICE — SUT NUROLON 0 CT1 CR8 18IN C521D

## (undated) DEVICE — GOWN ,SIRUS,NONREINFORCED SMALL: Brand: MEDLINE

## (undated) DEVICE — TOWEL,OR,DSP,ST,NATURAL,DLX,4/PK,20PK/CS: Brand: MEDLINE

## (undated) DEVICE — FLEX ADVANTAGE 1500CC: Brand: FLEX ADVANTAGE

## (undated) DEVICE — GOWN PROC ENDOARMOR GI LVL3 HY/SHLD UNIV

## (undated) DEVICE — SUT SILK 2/0 SH CR8 18IN CR8 C012D

## (undated) DEVICE — ENSEAL TEMPERATURE CONTROLLED TISSUE SEALING TECHNOLOGY DISPOSABLE TISSUE SEALING DEVICE TAPTRONIC TRIGGER ACTIVATED POWER 5MM JAW STYLE: Brand: ENSEAL

## (undated) DEVICE — CANN NASL CO2 TRULINK W/O2 A/

## (undated) DEVICE — SUT SILK 3/0 TIES 18IN A184H

## (undated) DEVICE — BARRIER, ABSORBABLE, ADHESION: Brand: SEPRAFILM® PROCEDURE PACK

## (undated) DEVICE — PENCL E/S ULTRAVAC TELESCP NOSE HOLSTR 10FT

## (undated) DEVICE — TRAP FLD MINIVAC MEGADYNE 100ML

## (undated) DEVICE — SUT SILK 2/0 TIES 18IN A185H

## (undated) DEVICE — STPLR SKIN VISISTAT WD 35CT

## (undated) DEVICE — DRSNG WND BORDR/ADHS NONADHR/GZ LF 4X4IN STRL

## (undated) DEVICE — ELECTRD BLD EZ CLN MOD 6.5IN

## (undated) DEVICE — BNDR ABD 4PANEL 12IN 46 TO 62IN

## (undated) DEVICE — VIAL FORMLN CAP 10PCT 20ML

## (undated) DEVICE — SUT VIC 0 TN 27IN DYED JTN0G

## (undated) DEVICE — MEDI-VAC YANKAUER SUCTION HANDLE W/BULBOUS TIP: Brand: CARDINAL HEALTH

## (undated) DEVICE — WOUND RETRACTOR AND PROTECTOR: Brand: ALEXIS O WOUND PROTECTOR-RETRACTOR

## (undated) DEVICE — ST TISSOMAT SPRY